# Patient Record
Sex: FEMALE | Race: WHITE | Employment: OTHER | URBAN - NONMETROPOLITAN AREA
[De-identification: names, ages, dates, MRNs, and addresses within clinical notes are randomized per-mention and may not be internally consistent; named-entity substitution may affect disease eponyms.]

---

## 2017-01-20 RX ORDER — PRAVASTATIN SODIUM 40 MG
TABLET ORAL
Qty: 30 TABLET | Refills: 11 | Status: SHIPPED | OUTPATIENT
Start: 2017-01-20 | End: 2018-01-30 | Stop reason: ALTCHOICE

## 2017-01-20 RX ORDER — SAXAGLIPTIN 5 MG/1
TABLET, FILM COATED ORAL
Qty: 30 TABLET | Refills: 11 | Status: SHIPPED | OUTPATIENT
Start: 2017-01-20 | End: 2018-01-29 | Stop reason: SDUPTHER

## 2017-01-27 ENCOUNTER — OFFICE VISIT (OUTPATIENT)
Dept: FAMILY MEDICINE CLINIC | Age: 62
End: 2017-01-27

## 2017-01-27 VITALS
OXYGEN SATURATION: 97 % | HEIGHT: 64 IN | WEIGHT: 223 LBS | BODY MASS INDEX: 38.07 KG/M2 | HEART RATE: 94 BPM | SYSTOLIC BLOOD PRESSURE: 126 MMHG | DIASTOLIC BLOOD PRESSURE: 76 MMHG

## 2017-01-27 DIAGNOSIS — L57.0 ACTINIC KERATOSIS: ICD-10-CM

## 2017-01-27 DIAGNOSIS — R20.9 SKIN SENSATION DISTURBANCE: ICD-10-CM

## 2017-01-27 DIAGNOSIS — D49.2 ABNORMAL SKIN GROWTH: ICD-10-CM

## 2017-01-27 DIAGNOSIS — E78.2 MIXED HYPERLIPIDEMIA: Primary | ICD-10-CM

## 2017-01-27 DIAGNOSIS — I10 BENIGN ESSENTIAL HTN: ICD-10-CM

## 2017-01-27 DIAGNOSIS — R39.15 URINARY URGENCY: ICD-10-CM

## 2017-01-27 DIAGNOSIS — Z11.4 SCREENING FOR HIV (HUMAN IMMUNODEFICIENCY VIRUS): ICD-10-CM

## 2017-01-27 DIAGNOSIS — F33.1 MODERATE EPISODE OF RECURRENT MAJOR DEPRESSIVE DISORDER (HCC): ICD-10-CM

## 2017-01-27 DIAGNOSIS — E13.8 DIABETES MELLITUS OF OTHER TYPE WITH COMPLICATION: ICD-10-CM

## 2017-01-27 DIAGNOSIS — E55.9 VITAMIN D DEFICIENCY: ICD-10-CM

## 2017-01-27 DIAGNOSIS — Z11.59 NEED FOR HEPATITIS C SCREENING TEST: ICD-10-CM

## 2017-01-27 LAB
BILIRUBIN, POC: ABNORMAL
BLOOD URINE, POC: ABNORMAL
CLARITY, POC: ABNORMAL
COLOR, POC: YELLOW
GLUCOSE URINE, POC: >=1000
KETONES, POC: ABNORMAL
LEUKOCYTE EST, POC: ABNORMAL
NITRITE, POC: ABNORMAL
PH, POC: 6
PROTEIN, POC: ABNORMAL
SPECIFIC GRAVITY, POC: 1.02
UROBILINOGEN, POC: 0.2

## 2017-01-27 PROCEDURE — 81002 URINALYSIS NONAUTO W/O SCOPE: CPT | Performed by: FAMILY MEDICINE

## 2017-01-27 PROCEDURE — 17000 DESTRUCT PREMALG LESION: CPT | Performed by: FAMILY MEDICINE

## 2017-01-27 PROCEDURE — 99214 OFFICE O/P EST MOD 30 MIN: CPT | Performed by: FAMILY MEDICINE

## 2017-01-27 ASSESSMENT — PATIENT HEALTH QUESTIONNAIRE - PHQ9
2. FEELING DOWN, DEPRESSED OR HOPELESS: 0
1. LITTLE INTEREST OR PLEASURE IN DOING THINGS: 0
SUM OF ALL RESPONSES TO PHQ QUESTIONS 1-9: 0
SUM OF ALL RESPONSES TO PHQ9 QUESTIONS 1 & 2: 0

## 2017-01-28 PROBLEM — F33.1 MODERATE EPISODE OF RECURRENT MAJOR DEPRESSIVE DISORDER (HCC): Status: ACTIVE | Noted: 2017-01-28

## 2017-02-06 ENCOUNTER — NURSE ONLY (OUTPATIENT)
Dept: FAMILY MEDICINE CLINIC | Age: 62
End: 2017-02-06

## 2017-02-06 DIAGNOSIS — E78.2 MIXED HYPERLIPIDEMIA: ICD-10-CM

## 2017-02-06 DIAGNOSIS — Z11.59 NEED FOR HEPATITIS C SCREENING TEST: ICD-10-CM

## 2017-02-06 DIAGNOSIS — Z11.4 SCREENING FOR HIV (HUMAN IMMUNODEFICIENCY VIRUS): ICD-10-CM

## 2017-02-06 DIAGNOSIS — E13.8 DIABETES MELLITUS OF OTHER TYPE WITH COMPLICATION: ICD-10-CM

## 2017-02-06 LAB
ALBUMIN SERPL-MCNC: 4.4 G/DL (ref 3.4–5)
ALP BLD-CCNC: 89 U/L (ref 40–129)
ALT SERPL-CCNC: 35 U/L (ref 10–40)
AST SERPL-CCNC: 24 U/L (ref 15–37)
BILIRUB SERPL-MCNC: 0.3 MG/DL (ref 0–1)
BILIRUBIN DIRECT: <0.2 MG/DL (ref 0–0.3)
BILIRUBIN, INDIRECT: NORMAL MG/DL (ref 0–1)
CHOLESTEROL, TOTAL: 233 MG/DL (ref 0–199)
CREATININE URINE: 79.6 MG/DL (ref 28–259)
HDLC SERPL-MCNC: 51 MG/DL (ref 40–60)
HEPATITIS C ANTIBODY INTERPRETATION: NORMAL
LDL CHOLESTEROL CALCULATED: 138 MG/DL
MICROALBUMIN UR-MCNC: <1.2 MG/DL
MICROALBUMIN/CREAT UR-RTO: NORMAL MG/G (ref 0–30)
TOTAL PROTEIN: 7.7 G/DL (ref 6.4–8.2)
TRIGL SERPL-MCNC: 222 MG/DL (ref 0–150)
VLDLC SERPL CALC-MCNC: 44 MG/DL

## 2017-02-06 PROCEDURE — 36415 COLL VENOUS BLD VENIPUNCTURE: CPT | Performed by: FAMILY MEDICINE

## 2017-02-07 DIAGNOSIS — E78.2 MIXED HYPERLIPIDEMIA: Primary | ICD-10-CM

## 2017-02-07 LAB
ESTIMATED AVERAGE GLUCOSE: 168.6 MG/DL
HBA1C MFR BLD: 7.5 %
HIV-1 AND HIV-2 ANTIBODIES: NORMAL

## 2017-04-27 RX ORDER — GLIMEPIRIDE 4 MG/1
TABLET ORAL
Qty: 60 TABLET | Refills: 1 | Status: SHIPPED | OUTPATIENT
Start: 2017-04-27 | End: 2017-07-31 | Stop reason: SDUPTHER

## 2017-07-31 ENCOUNTER — OFFICE VISIT (OUTPATIENT)
Dept: FAMILY MEDICINE CLINIC | Age: 62
End: 2017-07-31

## 2017-07-31 VITALS
OXYGEN SATURATION: 98 % | HEIGHT: 64 IN | DIASTOLIC BLOOD PRESSURE: 78 MMHG | TEMPERATURE: 98.4 F | HEART RATE: 67 BPM | WEIGHT: 222.3 LBS | BODY MASS INDEX: 37.95 KG/M2 | SYSTOLIC BLOOD PRESSURE: 126 MMHG

## 2017-07-31 DIAGNOSIS — F33.1 MODERATE EPISODE OF RECURRENT MAJOR DEPRESSIVE DISORDER (HCC): ICD-10-CM

## 2017-07-31 DIAGNOSIS — E11.9 CONTROLLED TYPE 2 DIABETES MELLITUS WITHOUT COMPLICATION, WITHOUT LONG-TERM CURRENT USE OF INSULIN (HCC): ICD-10-CM

## 2017-07-31 DIAGNOSIS — Z12.31 ENCOUNTER FOR SCREENING MAMMOGRAM FOR BREAST CANCER: ICD-10-CM

## 2017-07-31 DIAGNOSIS — H60.501 ACUTE OTITIS EXTERNA OF RIGHT EAR, UNSPECIFIED TYPE: ICD-10-CM

## 2017-07-31 DIAGNOSIS — E78.2 MIXED HYPERLIPIDEMIA: ICD-10-CM

## 2017-07-31 DIAGNOSIS — Z23 NEED FOR PROPHYLACTIC VACCINATION AND INOCULATION AGAINST VARICELLA: ICD-10-CM

## 2017-07-31 DIAGNOSIS — Z23 NEED FOR PROPHYLACTIC VACCINATION AGAINST STREPTOCOCCUS PNEUMONIAE (PNEUMOCOCCUS): ICD-10-CM

## 2017-07-31 DIAGNOSIS — I10 BENIGN ESSENTIAL HTN: Primary | ICD-10-CM

## 2017-07-31 DIAGNOSIS — E55.9 VITAMIN D DEFICIENCY: ICD-10-CM

## 2017-07-31 DIAGNOSIS — R53.83 FATIGUE, UNSPECIFIED TYPE: ICD-10-CM

## 2017-07-31 LAB
BASOPHILS ABSOLUTE: 0 K/UL (ref 0–0.2)
BASOPHILS RELATIVE PERCENT: 0.6 %
EOSINOPHILS ABSOLUTE: 0.2 K/UL (ref 0–0.6)
EOSINOPHILS RELATIVE PERCENT: 2.8 %
HCT VFR BLD CALC: 46.2 % (ref 36–48)
HEMOGLOBIN: 15.4 G/DL (ref 12–16)
LYMPHOCYTES ABSOLUTE: 2 K/UL (ref 1–5.1)
LYMPHOCYTES RELATIVE PERCENT: 32.7 %
MCH RBC QN AUTO: 28.2 PG (ref 26–34)
MCHC RBC AUTO-ENTMCNC: 33.3 G/DL (ref 31–36)
MCV RBC AUTO: 84.8 FL (ref 80–100)
MONOCYTES ABSOLUTE: 0.4 K/UL (ref 0–1.3)
MONOCYTES RELATIVE PERCENT: 6.6 %
NEUTROPHILS ABSOLUTE: 3.5 K/UL (ref 1.7–7.7)
NEUTROPHILS RELATIVE PERCENT: 57.3 %
PDW BLD-RTO: 15 % (ref 12.4–15.4)
PLATELET # BLD: 212 K/UL (ref 135–450)
PMV BLD AUTO: 8.9 FL (ref 5–10.5)
RBC # BLD: 5.44 M/UL (ref 4–5.2)
WBC # BLD: 6.2 K/UL (ref 4–11)

## 2017-07-31 PROCEDURE — 90732 PPSV23 VACC 2 YRS+ SUBQ/IM: CPT | Performed by: FAMILY MEDICINE

## 2017-07-31 PROCEDURE — 99214 OFFICE O/P EST MOD 30 MIN: CPT | Performed by: FAMILY MEDICINE

## 2017-07-31 PROCEDURE — 36415 COLL VENOUS BLD VENIPUNCTURE: CPT | Performed by: FAMILY MEDICINE

## 2017-07-31 PROCEDURE — 90471 IMMUNIZATION ADMIN: CPT | Performed by: FAMILY MEDICINE

## 2017-07-31 RX ORDER — GLIMEPIRIDE 4 MG/1
TABLET ORAL
Qty: 60 TABLET | Refills: 11 | Status: SHIPPED | OUTPATIENT
Start: 2017-07-31 | End: 2018-09-04 | Stop reason: SDUPTHER

## 2017-08-01 DIAGNOSIS — E11.9 CONTROLLED TYPE 2 DIABETES MELLITUS WITHOUT COMPLICATION, WITHOUT LONG-TERM CURRENT USE OF INSULIN (HCC): Primary | ICD-10-CM

## 2017-08-01 DIAGNOSIS — E78.00 ELEVATED CHOLESTEROL: Primary | ICD-10-CM

## 2017-08-01 LAB
A/G RATIO: 1.6 (ref 1.1–2.2)
ALBUMIN SERPL-MCNC: 4.7 G/DL (ref 3.4–5)
ALP BLD-CCNC: 79 U/L (ref 40–129)
ALT SERPL-CCNC: 28 U/L (ref 10–40)
ANION GAP SERPL CALCULATED.3IONS-SCNC: 15 MMOL/L (ref 3–16)
AST SERPL-CCNC: 22 U/L (ref 15–37)
BILIRUB SERPL-MCNC: <0.2 MG/DL (ref 0–1)
BUN BLDV-MCNC: 20 MG/DL (ref 7–20)
CALCIUM SERPL-MCNC: 9.8 MG/DL (ref 8.3–10.6)
CHLORIDE BLD-SCNC: 100 MMOL/L (ref 99–110)
CHOLESTEROL, TOTAL: 260 MG/DL (ref 0–199)
CO2: 24 MMOL/L (ref 21–32)
CREAT SERPL-MCNC: 0.7 MG/DL (ref 0.6–1.2)
ESTIMATED AVERAGE GLUCOSE: 162.8 MG/DL
GFR AFRICAN AMERICAN: >60
GFR NON-AFRICAN AMERICAN: >60
GLOBULIN: 3 G/DL
GLUCOSE BLD-MCNC: 117 MG/DL (ref 70–99)
HBA1C MFR BLD: 7.3 %
HDLC SERPL-MCNC: 56 MG/DL (ref 40–60)
LDL CHOLESTEROL CALCULATED: 145 MG/DL
POTASSIUM SERPL-SCNC: 4.7 MMOL/L (ref 3.5–5.1)
SODIUM BLD-SCNC: 139 MMOL/L (ref 136–145)
TOTAL PROTEIN: 7.7 G/DL (ref 6.4–8.2)
TRIGL SERPL-MCNC: 297 MG/DL (ref 0–150)
TSH SERPL DL<=0.05 MIU/L-ACNC: 2.05 UIU/ML (ref 0.27–4.2)
VITAMIN D 25-HYDROXY: 73.2 NG/ML
VLDLC SERPL CALC-MCNC: 59 MG/DL

## 2017-08-01 RX ORDER — ATORVASTATIN CALCIUM 40 MG/1
40 TABLET, FILM COATED ORAL DAILY
Qty: 30 TABLET | Refills: 5 | Status: SHIPPED | OUTPATIENT
Start: 2017-08-01 | End: 2018-01-30 | Stop reason: SINTOL

## 2017-08-02 DIAGNOSIS — E55.9 VITAMIN D DEFICIENCY: Primary | ICD-10-CM

## 2017-08-02 DIAGNOSIS — E11.9 DIABETES MELLITUS WITHOUT COMPLICATION (HCC): ICD-10-CM

## 2017-11-13 DIAGNOSIS — I10 BENIGN ESSENTIAL HTN: ICD-10-CM

## 2017-11-13 RX ORDER — LISINOPRIL 10 MG/1
TABLET ORAL
Qty: 30 TABLET | Refills: 10 | Status: SHIPPED | OUTPATIENT
Start: 2017-11-13 | End: 2018-07-23 | Stop reason: SDUPTHER

## 2017-11-13 RX ORDER — CHOLECALCIFEROL (VITAMIN D3) 1250 MCG
CAPSULE ORAL
Qty: 4 CAPSULE | Refills: 10 | Status: SHIPPED | OUTPATIENT
Start: 2017-11-13 | End: 2018-03-28 | Stop reason: ALTCHOICE

## 2018-01-16 ENCOUNTER — TELEPHONE (OUTPATIENT)
Dept: FAMILY MEDICINE CLINIC | Age: 63
End: 2018-01-16

## 2018-01-16 DIAGNOSIS — Z12.39 SCREENING FOR BREAST CANCER: Primary | ICD-10-CM

## 2018-01-29 RX ORDER — SAXAGLIPTIN 5 MG/1
TABLET, FILM COATED ORAL
Qty: 30 TABLET | Refills: 10 | Status: SHIPPED | OUTPATIENT
Start: 2018-01-29 | End: 2018-07-23

## 2018-01-29 RX ORDER — CANAGLIFLOZIN 300 MG/1
TABLET, FILM COATED ORAL
Qty: 30 TABLET | Refills: 3 | Status: SHIPPED | OUTPATIENT
Start: 2018-01-29 | End: 2018-09-04 | Stop reason: SDUPTHER

## 2018-01-30 ENCOUNTER — OFFICE VISIT (OUTPATIENT)
Dept: FAMILY MEDICINE CLINIC | Age: 63
End: 2018-01-30

## 2018-01-30 VITALS
HEIGHT: 62 IN | WEIGHT: 221.8 LBS | SYSTOLIC BLOOD PRESSURE: 148 MMHG | HEART RATE: 84 BPM | OXYGEN SATURATION: 98 % | BODY MASS INDEX: 40.82 KG/M2 | DIASTOLIC BLOOD PRESSURE: 72 MMHG

## 2018-01-30 DIAGNOSIS — B37.31 VAGINAL YEAST INFECTION: Primary | ICD-10-CM

## 2018-01-30 PROCEDURE — 99212 OFFICE O/P EST SF 10 MIN: CPT | Performed by: FAMILY MEDICINE

## 2018-01-30 RX ORDER — FLUCONAZOLE 100 MG/1
TABLET ORAL
Qty: 3 TABLET | Refills: 0 | Status: SHIPPED | OUTPATIENT
Start: 2018-01-30 | End: 2018-02-08 | Stop reason: ALTCHOICE

## 2018-01-30 NOTE — PROGRESS NOTES
Chief Complaint   Patient presents with    Vaginal Discharge    Vaginal Itching   Also letting us know that she had to stop the atorvastatin due to leg and shoulder pain. The vaginal discharge is sticky and has an odor smell. She will return for pap and pelvic with an NP.  HPI:   Abbi Arthur is a 66 y.o. (: 1955) here today for vaginal discharge and itching. Patient's medications, allergies, past medical, surgical, social and family histories were reviewed and updated as appropriate on 2018 at 2:55 PM.    Hemoglobin A1C (%)   Date Value   2017 7.3     Microalbumin, Random Urine (mg/dL)   Date Value   2017 <1.20     LDL Calculated (mg/dL)   Date Value   2017 145 (H)     Review of Systems   Genitourinary: Positive for vaginal discharge. Additional review of systems may be scanned into the media section of this medical record. Any responses requiring further intervention were pursued. OBJECTIVE:    Ht 5' 2.25\" (1.581 m)   Wt 221 lb 12.8 oz (100.6 kg)   BMI 40.24 kg/m²   Physical Exam   Constitutional: She is oriented to person, place, and time. She appears well-developed and well-nourished. No distress. HENT:   Head: Normocephalic and atraumatic. Cardiovascular: Normal rate. Pulmonary/Chest: Effort normal. No respiratory distress. Neurological: She is alert and oriented to person, place, and time. Skin: She is not diaphoretic. Psychiatric: She has a normal mood and affect. Her behavior is normal. Judgment and thought content normal.   Nursing note and vitals reviewed. ASSESSMENT/PLAN:    Angus Lovell was seen today for vaginal discharge and vaginal itching. Diagnoses and all orders for this visit:    Vaginal yeast infection    Other orders  -     fluconazole (DIFLUCAN) 100 MG tablet; 1 daily    Patient did not wish for me to examine, she actually thought she was seeing the nurse practitioner today.   I advised we will treat empirically with Diflucan and

## 2018-01-31 RX ORDER — PRAVASTATIN SODIUM 20 MG
20 TABLET ORAL DAILY
Qty: 30 TABLET | Refills: 11 | Status: SHIPPED | OUTPATIENT
Start: 2018-01-31 | End: 2018-07-23 | Stop reason: ALTCHOICE

## 2018-02-08 ENCOUNTER — OFFICE VISIT (OUTPATIENT)
Dept: FAMILY MEDICINE CLINIC | Age: 63
End: 2018-02-08

## 2018-02-08 VITALS
OXYGEN SATURATION: 96 % | BODY MASS INDEX: 39.48 KG/M2 | WEIGHT: 217.6 LBS | DIASTOLIC BLOOD PRESSURE: 90 MMHG | HEART RATE: 92 BPM | SYSTOLIC BLOOD PRESSURE: 124 MMHG

## 2018-02-08 DIAGNOSIS — Z12.31 ENCOUNTER FOR SCREENING MAMMOGRAM FOR BREAST CANCER: Primary | ICD-10-CM

## 2018-02-08 DIAGNOSIS — B37.2 YEAST INFECTION OF THE SKIN: ICD-10-CM

## 2018-02-08 DIAGNOSIS — B37.31 VAGINAL YEAST INFECTION: ICD-10-CM

## 2018-02-08 DIAGNOSIS — N95.2 POST-MENOPAUSAL ATROPHIC VAGINITIS: ICD-10-CM

## 2018-02-08 DIAGNOSIS — B96.89 BACTERIAL VAGINOSIS: ICD-10-CM

## 2018-02-08 DIAGNOSIS — R35.0 URINARY FREQUENCY: ICD-10-CM

## 2018-02-08 DIAGNOSIS — N76.0 BACTERIAL VAGINOSIS: ICD-10-CM

## 2018-02-08 DIAGNOSIS — Z78.0 POST-MENOPAUSAL: ICD-10-CM

## 2018-02-08 DIAGNOSIS — N89.8 VAGINAL DISCHARGE: ICD-10-CM

## 2018-02-08 LAB
BILIRUBIN, POC: NORMAL
BLOOD URINE, POC: NORMAL
CLARITY, POC: NORMAL
COLOR, POC: YELLOW
GLUCOSE URINE, POC: NORMAL
KETONES, POC: NORMAL
LEUKOCYTE EST, POC: NORMAL
NITRITE, POC: NORMAL
PH, POC: 5
PROTEIN, POC: NORMAL
SPECIFIC GRAVITY, POC: 1.02
UROBILINOGEN, POC: 0.2

## 2018-02-08 PROCEDURE — 99214 OFFICE O/P EST MOD 30 MIN: CPT | Performed by: NURSE PRACTITIONER

## 2018-02-08 PROCEDURE — 81002 URINALYSIS NONAUTO W/O SCOPE: CPT | Performed by: NURSE PRACTITIONER

## 2018-02-08 RX ORDER — NYSTATIN 100000 [USP'U]/G
POWDER TOPICAL
Qty: 30 G | Refills: 3 | Status: SHIPPED | OUTPATIENT
Start: 2018-02-08 | End: 2018-07-23 | Stop reason: ALTCHOICE

## 2018-02-08 RX ORDER — METRONIDAZOLE 500 MG/1
500 TABLET ORAL 3 TIMES DAILY
Qty: 30 TABLET | Refills: 0 | Status: SHIPPED | OUTPATIENT
Start: 2018-02-08 | End: 2018-02-18

## 2018-02-08 RX ORDER — FLUCONAZOLE 150 MG/1
150 TABLET ORAL ONCE
Qty: 1 TABLET | Refills: 0 | Status: SHIPPED | OUTPATIENT
Start: 2018-02-08 | End: 2018-02-08

## 2018-02-09 LAB
CANDIDA SPECIES, DNA PROBE: ABNORMAL
GARDNERELLA VAGINALIS, DNA PROBE: ABNORMAL
TRICHOMONAS VAGINALIS DNA: ABNORMAL

## 2018-02-10 LAB — URINE CULTURE, ROUTINE: NORMAL

## 2018-02-10 ASSESSMENT — ENCOUNTER SYMPTOMS
COLOR CHANGE: 1
RESPIRATORY NEGATIVE: 1
GASTROINTESTINAL NEGATIVE: 1

## 2018-02-12 LAB
C. TRACHOMATIS DNA ,URINE: NEGATIVE
HPV COMMENT: NORMAL
HPV TYPE 16: NOT DETECTED
HPV TYPE 18: NOT DETECTED
HPVOH (OTHER TYPES): NOT DETECTED
N. GONORRHOEAE DNA, URINE: NEGATIVE

## 2018-02-23 DIAGNOSIS — Z12.39 BREAST CANCER SCREENING: Primary | ICD-10-CM

## 2018-03-14 ENCOUNTER — TELEPHONE (OUTPATIENT)
Dept: FAMILY MEDICINE CLINIC | Age: 63
End: 2018-03-14

## 2018-03-14 DIAGNOSIS — E11.9 TYPE 2 DIABETES MELLITUS WITHOUT COMPLICATION, WITHOUT LONG-TERM CURRENT USE OF INSULIN (HCC): ICD-10-CM

## 2018-03-14 DIAGNOSIS — Z12.39 SCREENING FOR BREAST CANCER: ICD-10-CM

## 2018-03-19 ENCOUNTER — NURSE ONLY (OUTPATIENT)
Dept: FAMILY MEDICINE CLINIC | Age: 63
End: 2018-03-19

## 2018-03-19 DIAGNOSIS — E11.9 CONTROLLED TYPE 2 DIABETES MELLITUS WITHOUT COMPLICATION, WITHOUT LONG-TERM CURRENT USE OF INSULIN (HCC): ICD-10-CM

## 2018-03-19 DIAGNOSIS — E78.00 ELEVATED CHOLESTEROL: ICD-10-CM

## 2018-03-19 DIAGNOSIS — E55.9 VITAMIN D DEFICIENCY: ICD-10-CM

## 2018-03-19 DIAGNOSIS — E11.9 DIABETES MELLITUS WITHOUT COMPLICATION (HCC): ICD-10-CM

## 2018-03-19 DIAGNOSIS — E11.9 TYPE 2 DIABETES MELLITUS WITHOUT COMPLICATION, WITHOUT LONG-TERM CURRENT USE OF INSULIN (HCC): ICD-10-CM

## 2018-03-19 PROCEDURE — 36415 COLL VENOUS BLD VENIPUNCTURE: CPT | Performed by: FAMILY MEDICINE

## 2018-03-20 LAB
ALBUMIN SERPL-MCNC: 4.8 G/DL (ref 3.4–5)
ALP BLD-CCNC: 64 U/L (ref 40–129)
ALT SERPL-CCNC: 28 U/L (ref 10–40)
AST SERPL-CCNC: 26 U/L (ref 15–37)
BILIRUB SERPL-MCNC: 0.3 MG/DL (ref 0–1)
BILIRUBIN DIRECT: <0.2 MG/DL (ref 0–0.3)
BILIRUBIN, INDIRECT: NORMAL MG/DL (ref 0–1)
CHOLESTEROL, TOTAL: 324 MG/DL (ref 0–199)
CREATININE URINE: 71.4 MG/DL (ref 28–259)
ESTIMATED AVERAGE GLUCOSE: 159.9 MG/DL
HBA1C MFR BLD: 7.2 %
HDLC SERPL-MCNC: 56 MG/DL (ref 40–60)
LDL CHOLESTEROL CALCULATED: 214 MG/DL
MICROALBUMIN UR-MCNC: <1.2 MG/DL
MICROALBUMIN/CREAT UR-RTO: NORMAL MG/G (ref 0–30)
TOTAL PROTEIN: 7.9 G/DL (ref 6.4–8.2)
TRIGL SERPL-MCNC: 271 MG/DL (ref 0–150)
VLDLC SERPL CALC-MCNC: 54 MG/DL

## 2018-03-21 LAB — VITAMIN D 25-HYDROXY: 116.8 NG/ML

## 2018-03-21 RX ORDER — ROSUVASTATIN CALCIUM 5 MG/1
5 TABLET, COATED ORAL NIGHTLY
Qty: 30 TABLET | Refills: 11 | Status: SHIPPED | OUTPATIENT
Start: 2018-03-21 | End: 2019-04-08 | Stop reason: SDUPTHER

## 2018-06-20 ENCOUNTER — TELEPHONE (OUTPATIENT)
Dept: FAMILY MEDICINE CLINIC | Age: 63
End: 2018-06-20

## 2018-06-20 NOTE — TELEPHONE ENCOUNTER
Attempted to contact patient on 6/20/2018. Result: left message on the patient's voicemail asking patient to return my call. Pre-Visit planning not completed.

## 2018-06-22 NOTE — TELEPHONE ENCOUNTER
Attempted to contact patient on 6/22/2018. Result: left message on the patient's voicemail asking patient to return my call. Pre-Visit planning not completed.

## 2018-07-10 ENCOUNTER — TELEPHONE (OUTPATIENT)
Dept: FAMILY MEDICINE CLINIC | Age: 63
End: 2018-07-10

## 2018-07-16 NOTE — TELEPHONE ENCOUNTER
Attempted to contact patient on 7/16/2018. Result: left message on the patient's voicemail asking patient to return my call. Pre-Visit planning not completed.

## 2018-07-18 DIAGNOSIS — Z12.39 SCREENING FOR BREAST CANCER: ICD-10-CM

## 2018-07-18 DIAGNOSIS — Z12.39 BREAST CANCER SCREENING: ICD-10-CM

## 2018-07-18 DIAGNOSIS — Z78.0 POST-MENOPAUSAL: ICD-10-CM

## 2018-07-23 ENCOUNTER — OFFICE VISIT (OUTPATIENT)
Dept: FAMILY MEDICINE CLINIC | Age: 63
End: 2018-07-23

## 2018-07-23 VITALS
SYSTOLIC BLOOD PRESSURE: 138 MMHG | DIASTOLIC BLOOD PRESSURE: 72 MMHG | HEART RATE: 87 BPM | OXYGEN SATURATION: 98 % | HEIGHT: 63 IN | WEIGHT: 222.4 LBS | BODY MASS INDEX: 39.41 KG/M2

## 2018-07-23 DIAGNOSIS — F32.9 REACTIVE DEPRESSION: ICD-10-CM

## 2018-07-23 DIAGNOSIS — E55.9 VITAMIN D DEFICIENCY: ICD-10-CM

## 2018-07-23 DIAGNOSIS — E78.2 MIXED HYPERLIPIDEMIA: ICD-10-CM

## 2018-07-23 DIAGNOSIS — R09.89 BILATERAL CAROTID BRUITS: ICD-10-CM

## 2018-07-23 DIAGNOSIS — E11.9 CONTROLLED TYPE 2 DIABETES MELLITUS WITHOUT COMPLICATION, WITHOUT LONG-TERM CURRENT USE OF INSULIN (HCC): Primary | ICD-10-CM

## 2018-07-23 DIAGNOSIS — I10 BENIGN ESSENTIAL HTN: ICD-10-CM

## 2018-07-23 PROBLEM — F33.1 MODERATE EPISODE OF RECURRENT MAJOR DEPRESSIVE DISORDER (HCC): Status: RESOLVED | Noted: 2017-01-28 | Resolved: 2018-07-23

## 2018-07-23 LAB — HBA1C MFR BLD: 6.7 %

## 2018-07-23 PROCEDURE — 83036 HEMOGLOBIN GLYCOSYLATED A1C: CPT | Performed by: FAMILY MEDICINE

## 2018-07-23 PROCEDURE — 99214 OFFICE O/P EST MOD 30 MIN: CPT | Performed by: FAMILY MEDICINE

## 2018-07-23 RX ORDER — LISINOPRIL 2.5 MG/1
TABLET ORAL
Qty: 30 TABLET | Refills: 11 | Status: SHIPPED | OUTPATIENT
Start: 2018-07-23 | End: 2019-07-03

## 2018-07-27 ENCOUNTER — NURSE ONLY (OUTPATIENT)
Dept: FAMILY MEDICINE CLINIC | Age: 63
End: 2018-07-27

## 2018-07-27 DIAGNOSIS — E55.9 VITAMIN D DEFICIENCY: ICD-10-CM

## 2018-07-27 DIAGNOSIS — I10 BENIGN ESSENTIAL HTN: ICD-10-CM

## 2018-07-27 DIAGNOSIS — E78.2 MIXED HYPERLIPIDEMIA: ICD-10-CM

## 2018-07-27 DIAGNOSIS — E11.9 CONTROLLED TYPE 2 DIABETES MELLITUS WITHOUT COMPLICATION, WITHOUT LONG-TERM CURRENT USE OF INSULIN (HCC): Primary | ICD-10-CM

## 2018-07-27 LAB
A/G RATIO: 1.5 (ref 1.1–2.2)
ALBUMIN SERPL-MCNC: 4.5 G/DL (ref 3.4–5)
ALP BLD-CCNC: 81 U/L (ref 40–129)
ALT SERPL-CCNC: 29 U/L (ref 10–40)
ANION GAP SERPL CALCULATED.3IONS-SCNC: 15 MMOL/L (ref 3–16)
AST SERPL-CCNC: 24 U/L (ref 15–37)
BILIRUB SERPL-MCNC: 0.3 MG/DL (ref 0–1)
BUN BLDV-MCNC: 20 MG/DL (ref 7–20)
CALCIUM SERPL-MCNC: 9.6 MG/DL (ref 8.3–10.6)
CHLORIDE BLD-SCNC: 101 MMOL/L (ref 99–110)
CHOLESTEROL, TOTAL: 188 MG/DL (ref 0–199)
CO2: 23 MMOL/L (ref 21–32)
CREAT SERPL-MCNC: 0.9 MG/DL (ref 0.6–1.2)
GFR AFRICAN AMERICAN: >60
GFR NON-AFRICAN AMERICAN: >60
GLOBULIN: 3.1 G/DL
GLUCOSE BLD-MCNC: 136 MG/DL (ref 70–99)
HDLC SERPL-MCNC: 58 MG/DL (ref 40–60)
LDL CHOLESTEROL CALCULATED: 85 MG/DL
POTASSIUM SERPL-SCNC: 4.9 MMOL/L (ref 3.5–5.1)
SODIUM BLD-SCNC: 139 MMOL/L (ref 136–145)
TOTAL PROTEIN: 7.6 G/DL (ref 6.4–8.2)
TRIGL SERPL-MCNC: 225 MG/DL (ref 0–150)
VITAMIN D 25-HYDROXY: 86.3 NG/ML
VLDLC SERPL CALC-MCNC: 45 MG/DL

## 2018-07-27 PROCEDURE — 36415 COLL VENOUS BLD VENIPUNCTURE: CPT | Performed by: FAMILY MEDICINE

## 2018-07-28 LAB
ESTIMATED AVERAGE GLUCOSE: 154.2 MG/DL
HBA1C MFR BLD: 7 %

## 2018-09-04 RX ORDER — CANAGLIFLOZIN 300 MG/1
TABLET, FILM COATED ORAL
Qty: 30 TABLET | Refills: 2 | Status: SHIPPED | OUTPATIENT
Start: 2018-09-04 | End: 2018-12-29 | Stop reason: SDUPTHER

## 2018-09-04 RX ORDER — GLIMEPIRIDE 4 MG/1
TABLET ORAL
Qty: 60 TABLET | Refills: 2 | Status: SHIPPED | OUTPATIENT
Start: 2018-09-04 | End: 2019-06-10 | Stop reason: SDUPTHER

## 2018-09-07 ENCOUNTER — HOSPITAL ENCOUNTER (OUTPATIENT)
Dept: VASCULAR LAB | Age: 63
Discharge: HOME | End: 2018-09-07
Payer: COMMERCIAL

## 2018-09-07 DIAGNOSIS — R09.89 BILATERAL CAROTID BRUITS: ICD-10-CM

## 2018-09-07 PROCEDURE — 93880 EXTRACRANIAL BILAT STUDY: CPT

## 2018-09-11 DIAGNOSIS — R09.89 BILATERAL CAROTID BRUITS: Primary | ICD-10-CM

## 2018-11-14 ENCOUNTER — TELEPHONE (OUTPATIENT)
Dept: FAMILY MEDICINE CLINIC | Age: 63
End: 2018-11-14

## 2018-11-27 ENCOUNTER — OFFICE VISIT (OUTPATIENT)
Dept: FAMILY MEDICINE CLINIC | Age: 63
End: 2018-11-27
Payer: COMMERCIAL

## 2018-11-27 VITALS
OXYGEN SATURATION: 96 % | BODY MASS INDEX: 40.22 KG/M2 | HEIGHT: 63 IN | HEART RATE: 71 BPM | WEIGHT: 227 LBS | DIASTOLIC BLOOD PRESSURE: 74 MMHG | SYSTOLIC BLOOD PRESSURE: 118 MMHG

## 2018-11-27 DIAGNOSIS — I87.2 VENOUS INSUFFICIENCY: ICD-10-CM

## 2018-11-27 DIAGNOSIS — E55.9 VITAMIN D DEFICIENCY: ICD-10-CM

## 2018-11-27 DIAGNOSIS — R09.89 CAROTID BRUIT, UNSPECIFIED LATERALITY: ICD-10-CM

## 2018-11-27 DIAGNOSIS — E11.9 CONTROLLED TYPE 2 DIABETES MELLITUS WITHOUT COMPLICATION, WITHOUT LONG-TERM CURRENT USE OF INSULIN (HCC): Primary | ICD-10-CM

## 2018-11-27 DIAGNOSIS — Z12.31 ENCOUNTER FOR SCREENING MAMMOGRAM FOR BREAST CANCER: ICD-10-CM

## 2018-11-27 DIAGNOSIS — Z23 NEED FOR INFLUENZA VACCINATION: ICD-10-CM

## 2018-11-27 DIAGNOSIS — I10 BENIGN ESSENTIAL HTN: ICD-10-CM

## 2018-11-27 DIAGNOSIS — E78.2 MIXED HYPERLIPIDEMIA: ICD-10-CM

## 2018-11-27 LAB — HBA1C MFR BLD: 7.6 %

## 2018-11-27 PROCEDURE — 99214 OFFICE O/P EST MOD 30 MIN: CPT | Performed by: FAMILY MEDICINE

## 2018-11-27 PROCEDURE — 83036 HEMOGLOBIN GLYCOSYLATED A1C: CPT | Performed by: FAMILY MEDICINE

## 2018-11-27 PROCEDURE — 90688 IIV4 VACCINE SPLT 0.5 ML IM: CPT | Performed by: FAMILY MEDICINE

## 2018-11-27 PROCEDURE — 90471 IMMUNIZATION ADMIN: CPT | Performed by: FAMILY MEDICINE

## 2018-11-27 NOTE — PROGRESS NOTES
TABLET BY MOUTH EVERY MORNING BEFORE BREAKFAST Yes JEANETTE Prather CNP   lisinopril (PRINIVIL;ZESTRIL) 2.5 MG tablet 1 daily Yes Rosy Alcantar MD   vitamin D-3 (CHOLECALCIFEROL) 5000 units TABS Take 1 tablet by mouth daily Yes Rosy Alcantar MD   rosuvastatin (CRESTOR) 5 MG tablet Take 1 tablet by mouth nightly Yes Rosy Alcantar MD   Omega-3 Fatty Acids (OMEGA 3 PO) Take by mouth Yes Historical Provider, MD   fluticasone (FLONASE) 50 MCG/ACT nasal spray In each nostril daily Yes Rosy Alcantar MD   cetirizine (ZYRTEC ALLERGY) 10 MG tablet Take 1 tablet by mouth daily Yes Rosy Alcantar MD   calcium carbonate (OSCAL) 500 MG TABS tablet Take 2 tablets by mouth daily Yes Rosy Alcantar MD   aspirin 81 MG tablet Take 81 mg by mouth daily. Yes Historical Provider, MD     Allergies   Allergen Reactions    Codeine     Fenofibrate      Skin turns red and get hives    Metformin And Related      She states it caused joint pain. She tried it 3 times and had same reaction. OBJECTIVE:  Estimated body mass index is 40.86 kg/m² as calculated from the following:    Height as of this encounter: 5' 2.5\" (1.588 m). Weight as of this encounter: 227 lb (103 kg). Vitals:    11/27/18 1251   BP: 118/74   Site: Left Upper Arm   Position: Sitting   Cuff Size: Large Adult   Pulse: 71   SpO2: 96%   Weight: 227 lb (103 kg)   Height: 5' 2.5\" (1.588 m)     BP Readings from Last 2 Encounters:   11/27/18 118/74   07/23/18 138/72     Wt Readings from Last 3 Encounters:   11/27/18 227 lb (103 kg)   07/23/18 222 lb 6.4 oz (100.9 kg)   02/08/18 217 lb 9.6 oz (98.7 kg)       Physical Exam   Constitutional: She appears well-developed and well-nourished. No distress. HENT:   Head: Normocephalic and atraumatic.    Right Ear: External ear normal.   Left Ear: External ear normal.   Nose: Nose normal.   Lips symmetric   Eyes: Pupils are equal, round, and reactive ongoing signs or symptoms or worsening, or proceed to the emergency room. No changes in past medical history, past surgical history, social history, or family history were noted during the patient encounter unless specifically listed above. All updates of past medical history, past surgical history, social history, or family history were reviewed personally by me during the office visit. All problems listed in the assessment are stable unless noted otherwise. Medication profile reviewed personally by me during the office visit. Medication side effects and possible impairments from medications were discussed as applicable. This document was prepared by a combination of typing and transcription through a voice recognition software. Scribe attestation: Jarvis Haines am scribing for and in the presence of Desean Mccoy MD. Electronically signed by Candi Moore on 11/27/2018 at 1:26 PM      Provider attestation:     I, Dr. Shania Scherer, personally performed the services described in this documentation, as scribed by the above signed scribe in my presence, and it is both accurate and complete. I agree with the ROS and Past Histories independently gathered by the clinical support staff and the remaining scribed note accurately describes my personal service to the patient.       11/27/2018    1:47 PM

## 2018-12-31 RX ORDER — CANAGLIFLOZIN 300 MG/1
TABLET, FILM COATED ORAL
Qty: 30 TABLET | Refills: 1 | Status: SHIPPED | OUTPATIENT
Start: 2018-12-31 | End: 2019-03-07 | Stop reason: SDUPTHER

## 2019-02-12 ENCOUNTER — TELEPHONE (OUTPATIENT)
Dept: FAMILY MEDICINE CLINIC | Age: 64
End: 2019-02-12

## 2019-02-19 ENCOUNTER — NURSE ONLY (OUTPATIENT)
Dept: FAMILY MEDICINE CLINIC | Age: 64
End: 2019-02-19
Payer: COMMERCIAL

## 2019-02-19 DIAGNOSIS — E11.9 CONTROLLED TYPE 2 DIABETES MELLITUS WITHOUT COMPLICATION, WITHOUT LONG-TERM CURRENT USE OF INSULIN (HCC): ICD-10-CM

## 2019-02-19 DIAGNOSIS — E78.2 MIXED HYPERLIPIDEMIA: ICD-10-CM

## 2019-02-19 LAB
ALBUMIN SERPL-MCNC: 4.7 G/DL (ref 3.4–5)
ALP BLD-CCNC: 78 U/L (ref 40–129)
ALT SERPL-CCNC: 24 U/L (ref 10–40)
AST SERPL-CCNC: 20 U/L (ref 15–37)
BILIRUB SERPL-MCNC: <0.2 MG/DL (ref 0–1)
BILIRUBIN DIRECT: <0.2 MG/DL (ref 0–0.3)
BILIRUBIN, INDIRECT: NORMAL MG/DL (ref 0–1)
CHOLESTEROL, TOTAL: 175 MG/DL (ref 0–199)
FOLATE: >20 NG/ML (ref 4.78–24.2)
GLUCOSE BLD-MCNC: 167 MG/DL (ref 70–99)
HDLC SERPL-MCNC: 56 MG/DL (ref 40–60)
LDL CHOLESTEROL CALCULATED: 87 MG/DL
TOTAL PROTEIN: 7.6 G/DL (ref 6.4–8.2)
TRIGL SERPL-MCNC: 162 MG/DL (ref 0–150)
VITAMIN B-12: 536 PG/ML (ref 211–911)
VLDLC SERPL CALC-MCNC: 32 MG/DL

## 2019-02-19 PROCEDURE — 36415 COLL VENOUS BLD VENIPUNCTURE: CPT | Performed by: FAMILY MEDICINE

## 2019-02-20 LAB
ESTIMATED AVERAGE GLUCOSE: 177.2 MG/DL
HBA1C MFR BLD: 7.8 %

## 2019-02-26 ENCOUNTER — OFFICE VISIT (OUTPATIENT)
Dept: FAMILY MEDICINE CLINIC | Age: 64
End: 2019-02-26
Payer: COMMERCIAL

## 2019-02-26 VITALS
OXYGEN SATURATION: 96 % | WEIGHT: 227.6 LBS | BODY MASS INDEX: 40.97 KG/M2 | HEART RATE: 90 BPM | SYSTOLIC BLOOD PRESSURE: 128 MMHG | DIASTOLIC BLOOD PRESSURE: 88 MMHG

## 2019-02-26 DIAGNOSIS — I10 BENIGN ESSENTIAL HTN: ICD-10-CM

## 2019-02-26 DIAGNOSIS — R09.89 BILATERAL CAROTID BRUITS: ICD-10-CM

## 2019-02-26 DIAGNOSIS — E78.2 MIXED HYPERLIPIDEMIA: ICD-10-CM

## 2019-02-26 DIAGNOSIS — E55.9 VITAMIN D DEFICIENCY: ICD-10-CM

## 2019-02-26 DIAGNOSIS — F32.9 REACTIVE DEPRESSION: ICD-10-CM

## 2019-02-26 PROCEDURE — 99214 OFFICE O/P EST MOD 30 MIN: CPT | Performed by: FAMILY MEDICINE

## 2019-03-07 RX ORDER — CANAGLIFLOZIN 300 MG/1
TABLET, FILM COATED ORAL
Qty: 30 TABLET | Refills: 0 | Status: SHIPPED | OUTPATIENT
Start: 2019-03-07 | End: 2019-04-08 | Stop reason: SDUPTHER

## 2019-04-08 RX ORDER — ROSUVASTATIN CALCIUM 5 MG/1
TABLET, COATED ORAL
Qty: 30 TABLET | Refills: 10 | Status: SHIPPED | OUTPATIENT
Start: 2019-04-08 | End: 2020-05-15

## 2019-04-08 RX ORDER — CANAGLIFLOZIN 300 MG/1
TABLET, FILM COATED ORAL
Qty: 30 TABLET | Refills: 11 | Status: SHIPPED | OUTPATIENT
Start: 2019-04-08 | End: 2020-05-15

## 2019-04-08 RX ORDER — CANAGLIFLOZIN 300 MG/1
TABLET, FILM COATED ORAL
Qty: 30 TABLET | Refills: 0 | Status: SHIPPED | OUTPATIENT
Start: 2019-04-08 | End: 2019-05-10 | Stop reason: SDUPTHER

## 2019-04-08 NOTE — TELEPHONE ENCOUNTER
Refill request for rosuvastatin medication.      Name of Jerrell Skritter    Last visit - 2/26/19     Pending visit - 5/21/19    Last refill -3/21/18

## 2019-04-09 ENCOUNTER — TELEPHONE (OUTPATIENT)
Dept: FAMILY MEDICINE CLINIC | Age: 64
End: 2019-04-09

## 2019-05-07 ENCOUNTER — TELEPHONE (OUTPATIENT)
Dept: FAMILY MEDICINE CLINIC | Age: 64
End: 2019-05-07

## 2019-05-07 NOTE — TELEPHONE ENCOUNTER
Attempted to contact patient on 5/7/2019. Result: left message on the patient's voicemail asking patient to return my call. Pre-Visit planning not completed.      5/21

## 2019-05-15 NOTE — TELEPHONE ENCOUNTER
Attempted to contact patient on 5/15/2019. Result: left message on the patient's voicemail asking patient to return my call. Pre-Visit planning not completed.

## 2019-05-30 ENCOUNTER — TELEPHONE (OUTPATIENT)
Dept: FAMILY MEDICINE CLINIC | Age: 64
End: 2019-05-30

## 2019-06-10 RX ORDER — GLIMEPIRIDE 4 MG/1
TABLET ORAL
Qty: 60 TABLET | Refills: 1 | Status: SHIPPED | OUTPATIENT
Start: 2019-06-10 | End: 2019-11-16 | Stop reason: SDUPTHER

## 2019-06-19 ENCOUNTER — TELEPHONE (OUTPATIENT)
Dept: FAMILY MEDICINE CLINIC | Age: 64
End: 2019-06-19

## 2019-07-03 ENCOUNTER — OFFICE VISIT (OUTPATIENT)
Dept: FAMILY MEDICINE CLINIC | Age: 64
End: 2019-07-03
Payer: COMMERCIAL

## 2019-07-03 VITALS
HEIGHT: 63 IN | SYSTOLIC BLOOD PRESSURE: 128 MMHG | WEIGHT: 224 LBS | BODY MASS INDEX: 39.69 KG/M2 | DIASTOLIC BLOOD PRESSURE: 82 MMHG | HEART RATE: 77 BPM | OXYGEN SATURATION: 95 %

## 2019-07-03 DIAGNOSIS — F32.9 REACTIVE DEPRESSION: ICD-10-CM

## 2019-07-03 DIAGNOSIS — I10 BENIGN ESSENTIAL HTN: Primary | ICD-10-CM

## 2019-07-03 DIAGNOSIS — E78.2 MIXED HYPERLIPIDEMIA: ICD-10-CM

## 2019-07-03 DIAGNOSIS — I95.9 HYPOTENSION, UNSPECIFIED HYPOTENSION TYPE: ICD-10-CM

## 2019-07-03 DIAGNOSIS — E55.9 VITAMIN D DEFICIENCY: ICD-10-CM

## 2019-07-03 LAB
CREATININE URINE: 55.3 MG/DL (ref 28–259)
HBA1C MFR BLD: 7.2 %
MICROALBUMIN UR-MCNC: <1.2 MG/DL
MICROALBUMIN/CREAT UR-RTO: NORMAL MG/G (ref 0–30)
VITAMIN D 25-HYDROXY: 54.4 NG/ML

## 2019-07-03 PROCEDURE — 83036 HEMOGLOBIN GLYCOSYLATED A1C: CPT | Performed by: FAMILY MEDICINE

## 2019-07-03 PROCEDURE — 99214 OFFICE O/P EST MOD 30 MIN: CPT | Performed by: FAMILY MEDICINE

## 2019-07-03 PROCEDURE — 36415 COLL VENOUS BLD VENIPUNCTURE: CPT | Performed by: FAMILY MEDICINE

## 2019-07-03 NOTE — PROGRESS NOTES
Chief Complaint   Patient presents with    Diabetes    Hypertension    Hyperlipidemia    Other     pt has multiple medical complaints        HPI:  Angela Colunga is a 60 y.o. (: 1955) here today for 4 month follow up. Treatment Adherence:   Medication compliance:  compliant all of the time  Diet compliance:  noncompliant: enjoys ice cream  Weight trend: stable  Current exercise: no regular exercise  Barriers: none    Diabetes Mellitus Type 2: Current symptoms/problems include none. Home blood sugar records: patient says some weeks she'll test her BS every day and then the next week she won't check it at all. She usually checks it fasting in the morning & later in the day if she feels like its off. Any episodes of hypoglycemia? no  Eye exam current (within one year): no - last eye exam May 2018. Was negative for retinopathy. Tobacco history: She  reports that she quit smoking about 36 years ago. Her smoking use included cigarettes. She has a 8.00 pack-year smoking history. She has never used smokeless tobacco.   Daily Aspirin? Yes    Hypertension:  Home blood pressure monitoring: Yes - daily. Pt stopped taking the lisinopril 2.5 mg daily because she noticed that her BP was running in the 90's over 60's while she was on it & she felt very fatigued when her BP was that low. This morning her BP was 127/80. She is not adherent to a low sodium diet. Patient denies chest pain, shortness of breath, lightheadedness, blurred vision, peripheral edema and palpitations. Antihypertensive medication side effects: no medication side effects noted. Use of agents associated with hypertension: none. Hyperlipidemia:  No new myalgias or GI upset on rosuvastatin (Crestor).        Lab Results   Component Value Date    LABA1C 7.8 2019    LABA1C 7.6 2018    LABA1C 7.0 2018     Lab Results   Component Value Date    LABMICR <1.20 2018    CREATININE 0.9 2018     Lab Results signed by Aminah Jerome MA on 7/3/2019 at 8:42 AM      Provider attestation:     I, Dr. Dorothye Curling, personally performed the services described in this documentation, as scribed by the above signed scribe in my presence, and it is both accurate and complete. I agree with the ROS and Past Histories independently gathered by the clinical support staff and the remaining scribed note accurately describes my personal service to the patient.       7/3/2019    9:36 AM

## 2019-07-08 ENCOUNTER — TELEPHONE (OUTPATIENT)
Dept: FAMILY MEDICINE CLINIC | Age: 64
End: 2019-07-08

## 2019-10-17 ENCOUNTER — TELEPHONE (OUTPATIENT)
Dept: FAMILY MEDICINE CLINIC | Age: 64
End: 2019-10-17

## 2019-10-22 ENCOUNTER — TELEPHONE (OUTPATIENT)
Dept: FAMILY MEDICINE CLINIC | Age: 64
End: 2019-10-22

## 2019-11-05 ENCOUNTER — OFFICE VISIT (OUTPATIENT)
Dept: FAMILY MEDICINE CLINIC | Age: 64
End: 2019-11-05
Payer: COMMERCIAL

## 2019-11-05 VITALS
WEIGHT: 224.4 LBS | OXYGEN SATURATION: 98 % | SYSTOLIC BLOOD PRESSURE: 167 MMHG | BODY MASS INDEX: 40.39 KG/M2 | DIASTOLIC BLOOD PRESSURE: 103 MMHG | HEART RATE: 84 BPM

## 2019-11-05 DIAGNOSIS — R09.89 CAROTID BRUIT, UNSPECIFIED LATERALITY: ICD-10-CM

## 2019-11-05 DIAGNOSIS — E78.2 MIXED HYPERLIPIDEMIA: ICD-10-CM

## 2019-11-05 DIAGNOSIS — I10 ELEVATED BLOOD PRESSURE READING IN OFFICE WITH DIAGNOSIS OF HYPERTENSION: ICD-10-CM

## 2019-11-05 DIAGNOSIS — I10 BENIGN ESSENTIAL HTN: ICD-10-CM

## 2019-11-05 DIAGNOSIS — R03.1 BLOOD PRESSURE DECREASED: ICD-10-CM

## 2019-11-05 DIAGNOSIS — E55.9 VITAMIN D DEFICIENCY: ICD-10-CM

## 2019-11-05 DIAGNOSIS — F32.9 REACTIVE DEPRESSION: ICD-10-CM

## 2019-11-05 PROBLEM — I95.9 HYPOTENSION: Status: RESOLVED | Noted: 2019-07-03 | Resolved: 2019-11-05

## 2019-11-05 LAB — HBA1C MFR BLD: 7.2 %

## 2019-11-05 PROCEDURE — 83036 HEMOGLOBIN GLYCOSYLATED A1C: CPT | Performed by: FAMILY MEDICINE

## 2019-11-05 PROCEDURE — 99214 OFFICE O/P EST MOD 30 MIN: CPT | Performed by: FAMILY MEDICINE

## 2019-11-05 RX ORDER — LISINOPRIL 5 MG/1
5 TABLET ORAL DAILY
Qty: 30 TABLET | Refills: 11 | Status: SHIPPED | OUTPATIENT
Start: 2019-11-05 | End: 2020-11-13

## 2019-11-18 RX ORDER — GLIMEPIRIDE 4 MG/1
TABLET ORAL
Qty: 60 TABLET | Refills: 11 | Status: SHIPPED | OUTPATIENT
Start: 2019-11-18 | End: 2019-12-24

## 2019-12-10 ENCOUNTER — TELEPHONE (OUTPATIENT)
Dept: FAMILY MEDICINE CLINIC | Age: 64
End: 2019-12-10

## 2019-12-11 ENCOUNTER — OFFICE VISIT (OUTPATIENT)
Dept: FAMILY MEDICINE CLINIC | Age: 64
End: 2019-12-11
Payer: COMMERCIAL

## 2019-12-11 VITALS
WEIGHT: 222 LBS | HEART RATE: 86 BPM | DIASTOLIC BLOOD PRESSURE: 86 MMHG | SYSTOLIC BLOOD PRESSURE: 126 MMHG | TEMPERATURE: 98 F | OXYGEN SATURATION: 96 % | BODY MASS INDEX: 39.96 KG/M2

## 2019-12-11 DIAGNOSIS — B96.89 ACUTE BACTERIAL BRONCHITIS: Primary | ICD-10-CM

## 2019-12-11 DIAGNOSIS — J20.8 ACUTE BACTERIAL BRONCHITIS: Primary | ICD-10-CM

## 2019-12-11 DIAGNOSIS — R09.89 CHEST CONGESTION: ICD-10-CM

## 2019-12-11 DIAGNOSIS — R05.9 COUGH: ICD-10-CM

## 2019-12-11 DIAGNOSIS — J02.9 SORE THROAT: ICD-10-CM

## 2019-12-11 DIAGNOSIS — I10 BENIGN ESSENTIAL HTN: ICD-10-CM

## 2019-12-11 PROCEDURE — 87880 STREP A ASSAY W/OPTIC: CPT | Performed by: FAMILY MEDICINE

## 2019-12-11 PROCEDURE — 87804 INFLUENZA ASSAY W/OPTIC: CPT | Performed by: FAMILY MEDICINE

## 2019-12-11 PROCEDURE — 99213 OFFICE O/P EST LOW 20 MIN: CPT | Performed by: FAMILY MEDICINE

## 2019-12-11 RX ORDER — METHYLPREDNISOLONE 4 MG/1
TABLET ORAL
Qty: 1 KIT | Refills: 0 | Status: SHIPPED | OUTPATIENT
Start: 2019-12-11 | End: 2019-12-17

## 2019-12-11 RX ORDER — DOXYCYCLINE HYCLATE 100 MG
100 TABLET ORAL 2 TIMES DAILY
Qty: 20 TABLET | Refills: 0 | Status: SHIPPED | OUTPATIENT
Start: 2019-12-11 | End: 2019-12-21

## 2019-12-12 LAB
INFLUENZA A ANTIGEN, POC: NEGATIVE
INFLUENZA B ANTIGEN, POC: NEGATIVE
S PYO AG THROAT QL: NORMAL

## 2019-12-17 ENCOUNTER — TELEPHONE (OUTPATIENT)
Dept: FAMILY MEDICINE CLINIC | Age: 64
End: 2019-12-17

## 2020-02-25 ENCOUNTER — OFFICE VISIT (OUTPATIENT)
Dept: FAMILY MEDICINE CLINIC | Age: 65
End: 2020-02-25
Payer: COMMERCIAL

## 2020-02-25 VITALS
WEIGHT: 221 LBS | OXYGEN SATURATION: 96 % | HEART RATE: 94 BPM | BODY MASS INDEX: 39.16 KG/M2 | SYSTOLIC BLOOD PRESSURE: 124 MMHG | HEIGHT: 63 IN | DIASTOLIC BLOOD PRESSURE: 80 MMHG

## 2020-02-25 PROBLEM — R09.89 DECREASED RADIAL PULSE: Status: ACTIVE | Noted: 2020-02-25

## 2020-02-25 LAB
A/G RATIO: 1.5 (ref 1.1–2.2)
ALBUMIN SERPL-MCNC: 4.9 G/DL (ref 3.4–5)
ALP BLD-CCNC: 100 U/L (ref 40–129)
ALT SERPL-CCNC: 42 U/L (ref 10–40)
ANION GAP SERPL CALCULATED.3IONS-SCNC: 16 MMOL/L (ref 3–16)
AST SERPL-CCNC: 29 U/L (ref 15–37)
BILIRUB SERPL-MCNC: 0.3 MG/DL (ref 0–1)
BUN BLDV-MCNC: 16 MG/DL (ref 7–20)
CALCIUM SERPL-MCNC: 9.6 MG/DL (ref 8.3–10.6)
CHLORIDE BLD-SCNC: 97 MMOL/L (ref 99–110)
CHOLESTEROL, TOTAL: 261 MG/DL (ref 0–199)
CO2: 26 MMOL/L (ref 21–32)
CREAT SERPL-MCNC: 0.8 MG/DL (ref 0.6–1.2)
GFR AFRICAN AMERICAN: >60
GFR NON-AFRICAN AMERICAN: >60
GLOBULIN: 3.2 G/DL
GLUCOSE BLD-MCNC: 117 MG/DL (ref 70–99)
HBA1C MFR BLD: 7 %
HDLC SERPL-MCNC: 57 MG/DL (ref 40–60)
LDL CHOLESTEROL CALCULATED: ABNORMAL MG/DL
LDL CHOLESTEROL DIRECT: 173 MG/DL
POTASSIUM SERPL-SCNC: 4.5 MMOL/L (ref 3.5–5.1)
SODIUM BLD-SCNC: 139 MMOL/L (ref 136–145)
TOTAL PROTEIN: 8.1 G/DL (ref 6.4–8.2)
TRIGL SERPL-MCNC: 321 MG/DL (ref 0–150)
VLDLC SERPL CALC-MCNC: ABNORMAL MG/DL

## 2020-02-25 PROCEDURE — 99214 OFFICE O/P EST MOD 30 MIN: CPT | Performed by: FAMILY MEDICINE

## 2020-02-25 PROCEDURE — 36415 COLL VENOUS BLD VENIPUNCTURE: CPT | Performed by: FAMILY MEDICINE

## 2020-02-25 PROCEDURE — 83036 HEMOGLOBIN GLYCOSYLATED A1C: CPT | Performed by: FAMILY MEDICINE

## 2020-02-25 PROCEDURE — 3051F HG A1C>EQUAL 7.0%<8.0%: CPT | Performed by: FAMILY MEDICINE

## 2020-02-25 NOTE — PATIENT INSTRUCTIONS
2+ right radial pulse  1+ left radial  Patient should call the office immediately with new or ongoing signs or symptoms or worsening, or proceed to the emergency room. If you are on medications which could impair your senses, you are at risk of weakness, falls, dizziness, or drowsiness. You should be careful during activities which could place you at risk of harm, such as climbing, using stairs, operating machinery, or driving vehicles. If you feel you cannot safely do these activities, you should request others to help you, or avoid the activities altogether. If you are drowsy for any other reason, you should use the same precautions as listed above.

## 2020-02-25 NOTE — PROGRESS NOTES
Chief Complaint   Patient presents with    Hypertension    Hyperlipidemia    Diabetes    Other     pt has multiple medical complaints        HPI:  Tyler Nicholson is a 63 y.o. (: 1955) here today for 3 month follow up for management of chronic conditions. Pt reports that her left knee has been bothering her - especially if she's up walking for a couple hours. She describes it as a tingling/pulling sensation that radiates down the medial side of her leg. Pt says it was even hurting when she laid down at night and had to use ibuprofen to be able to get to sleep. Treatment Adherence:   Medication compliance:  compliant all of the time  Diet compliance:  compliant most of the time - she's doing the Keto diet still   Weight trend: stable   Current exercise: no regular exercise  Barriers: impairment:  physical: pt says her knee has been bothering her which limits her physical activity    Diabetes Mellitus Type 2: Current symptoms/problems include polyuria. Home blood sugar records: patient does not test  - her glucometer needs a battery so she hasn't been checking her BS lately. Any episodes of hypoglycemia? yes - pt is basing this on her symptoms   Eye exam current (within one year): no - going for eye exam in April   Tobacco history: She  reports that she quit smoking about 36 years ago. Her smoking use included cigarettes. She has a 8.00 pack-year smoking history. She has never used smokeless tobacco.   Daily Aspirin? Yes    Hypertension:  Home blood pressure monitoring: No.  She is adherent to a low sodium diet. Patient denies chest pain, shortness of breath, headache, blurred vision, peripheral edema, palpitations and dry cough. Antihypertensive medication side effects: no medication side effects noted. Use of agents associated with hypertension: none. Hyperlipidemia:  No new myalgias or GI upset on rosuvastatin (Crestor).        Lab Results   Component Value Date    LABA1C 7.2 11/05/2019    LABA1C 7.2 07/03/2019    LABA1C 7.8 02/19/2019     Lab Results   Component Value Date    LABMICR <1.20 07/03/2019    CREATININE 0.9 07/27/2018     Lab Results   Component Value Date    ALT 24 02/19/2019    AST 20 02/19/2019     Lab Results   Component Value Date    CHOL 175 02/19/2019    TRIG 162 (H) 02/19/2019    HDL 56 02/19/2019    LDLCALC 87 02/19/2019    LDLDIRECT 246 (H) 10/27/2015          Patient's medications, allergies, past medical, surgical, social and family histories were reviewed and updated asappropriate on 2/25/2020 at 2:17 PM.    ROS:  Review of Systems    All other systems reviewed and are negative except as noted above on 2/25/2020 at 2:17 PM. Additional review of systems may be scanned into the media section ofthis medical record. Any responses requiring further intervention were pursued.     Hemoglobin A1C (%)   Date Value   11/05/2019 7.2     Microalbumin, Random Urine (mg/dL)   Date Value   07/03/2019 <1.20     LDL Calculated (mg/dL)   Date Value   02/19/2019 87     Past Medical History:   Diagnosis Date    HTN (hypertension)     Hyperlipidemia     No history of procedure 2/5/16    colonoscopy    Type II or unspecified type diabetes mellitus without mention of complication, not stated as uncontrolled         Family History   Problem Relation Age of Onset    Diabetes Mother     Kidney Disease Mother     Heart Disease Mother     Heart Disease Father     High Blood Pressure Father      Social History     Socioeconomic History    Marital status:      Spouse name: Not on file    Number of children: Not on file    Years of education: Not on file    Highest education level: Not on file   Occupational History    Not on file   Social Needs    Financial resource strain: Not on file    Food insecurity:     Worry: Not on file     Inability: Not on file    Transportation needs:     Medical: Not on file     Non-medical: Not on file   Tobacco Use    Smoking status: Former Smoker     Packs/day: 1.00     Years: 8.00     Pack years: 8.00     Types: Cigarettes     Last attempt to quit: 1983     Years since quittin.6    Smokeless tobacco: Never Used    Tobacco comment: pt has passive smoke exposure - spouse smokes in the house   Substance and Sexual Activity    Alcohol use: No     Alcohol/week: 0.0 standard drinks    Drug use: No    Sexual activity: Yes     Partners: Male   Lifestyle    Physical activity:     Days per week: Not on file     Minutes per session: Not on file    Stress: Not on file   Relationships    Social connections:     Talks on phone: Not on file     Gets together: Not on file     Attends [employer] service: Not on file     Active member of club or organization: Not on file     Attends meetings of clubs or organizations: Not on file     Relationship status: Not on file    Intimate partner violence:     Fear of current or ex partner: Not on file     Emotionally abused: Not on file     Physically abused: Not on file     Forced sexual activity: Not on file   Other Topics Concern    Not on file   Social History Narrative    Not on file       Prior to Visit Medications    Medication Sig Taking?  Authorizing Provider   glimepiride (AMARYL) 4 MG tablet TAKE ONE TABLET BY MOUTH TWICE A DAY  Patient taking differently: Take 2 mg by mouth 2 times daily  Yes JEANETTE Shields CNP   lisinopril (PRINIVIL;ZESTRIL) 5 MG tablet Take 1 tablet by mouth daily  Patient taking differently: Take 2.5 mg by mouth daily  Yes Charmayne Dasen, MD   rosuvastatin (CRESTOR) 5 MG tablet TAKE ONE TABLET BY MOUTH ONCE NIGHTLY Yes Charmayne Dasen, MD   INVOKANA 300 MG TABS tablet Margette Abena MOUTH EVERY MORNING BEFORE BREAKFAST Yes Charmayne Dasen, MD   vitamin D-3 (CHOLECALCIFEROL) 5000 units TABS Take 1 tablet by mouth daily Yes Charmayne Dasen, MD   Omega-3 Fatty Acids (OMEGA 3 PO) Take by mouth Yes Historical Provider, MD fluticasone (FLONASE) 50 MCG/ACT nasal spray In each nostril daily Yes Chacho Hallman MD   cetirizine (ZYRTEC ALLERGY) 10 MG tablet Take 1 tablet by mouth daily Yes Chacho Hallman MD   calcium carbonate (OSCAL) 500 MG TABS tablet Take 2 tablets by mouth daily Yes Chacho Hallman MD   aspirin 81 MG tablet Take 81 mg by mouth daily. Yes Historical Provider, MD     Allergies   Allergen Reactions    Codeine     Fenofibrate      Skin turns red and get hives    Metformin And Related      She states it caused joint pain. She tried it 3 times and had same reaction. OBJECTIVE:  Estimated body mass index is 39.78 kg/m² as calculated from the following:    Height as of this encounter: 5' 2.5\" (1.588 m). Weight as of this encounter: 221 lb (100.2 kg). Vitals:    02/25/20 1350   BP: 124/80   Site: Left Upper Arm   Position: Sitting   Cuff Size: Large Adult   Pulse: 94   SpO2: 96%   Weight: 221 lb (100.2 kg)   Height: 5' 2.5\" (1.588 m)     BP Readings from Last 2 Encounters:   02/25/20 124/80   12/11/19 126/86     Wt Readings from Last 3 Encounters:   02/25/20 221 lb (100.2 kg)   12/11/19 222 lb (100.7 kg)   11/05/19 224 lb 6.4 oz (101.8 kg)       Physical Exam  Vitals signs and nursing note reviewed. Constitutional:       General: She is not in acute distress. Appearance: She is well-developed. She is not diaphoretic. HENT:      Head: Normocephalic and atraumatic. Right Ear: External ear normal.      Left Ear: External ear normal.      Nose: Nose normal.   Eyes:      General: Lids are normal. No scleral icterus. Right eye: No discharge. Left eye: No discharge. Pupils: Pupils are equal, round, and reactive to light. Neck:      Thyroid: No thyromegaly. Vascular: No JVD. Cardiovascular:      Rate and Rhythm: Normal rate and regular rhythm. Pulses:           Radial pulses are 2+ on the right side and 1+ on the left side.       Heart sounds:

## 2020-03-24 ENCOUNTER — HOSPITAL ENCOUNTER (OUTPATIENT)
Dept: VASCULAR LAB | Age: 65
Discharge: HOME | End: 2020-03-24
Payer: COMMERCIAL

## 2020-03-24 PROCEDURE — 93931 UPPER EXTREMITY STUDY: CPT

## 2020-05-15 RX ORDER — CANAGLIFLOZIN 300 MG/1
TABLET, FILM COATED ORAL
Qty: 30 TABLET | Refills: 9 | Status: SHIPPED | OUTPATIENT
Start: 2020-05-15 | End: 2020-08-17

## 2020-05-15 RX ORDER — ROSUVASTATIN CALCIUM 5 MG/1
TABLET, COATED ORAL
Qty: 30 TABLET | Refills: 9 | Status: SHIPPED | OUTPATIENT
Start: 2020-05-15 | End: 2021-07-12

## 2020-07-29 ENCOUNTER — TELEPHONE (OUTPATIENT)
Dept: ADMINISTRATIVE | Age: 65
End: 2020-07-29

## 2020-07-30 NOTE — TELEPHONE ENCOUNTER
Insurance does not mention any other alternatives, only for the patient to contact the insurance company.   Please ask her to do so to find alternatives to [facility]

## 2020-07-31 RX ORDER — EMPAGLIFLOZIN 25 MG/1
1 TABLET, FILM COATED ORAL DAILY
Qty: 30 TABLET | Refills: 5 | Status: SHIPPED | OUTPATIENT
Start: 2020-07-31 | End: 2021-03-15

## 2020-08-03 ENCOUNTER — TELEPHONE (OUTPATIENT)
Dept: ADMINISTRATIVE | Age: 65
End: 2020-08-03

## 2020-08-03 NOTE — TELEPHONE ENCOUNTER
PA SUBMITTED VIA CMM FOR   Jardiance 25MG tablets  Key: [id number] - PA Case ID: [id number] - Rx #: [id number]   STATUS: PENDING

## 2020-08-17 ENCOUNTER — OFFICE VISIT (OUTPATIENT)
Dept: FAMILY MEDICINE CLINIC | Age: 65
End: 2020-08-17
Payer: COMMERCIAL

## 2020-08-17 VITALS
TEMPERATURE: 98 F | BODY MASS INDEX: 40.33 KG/M2 | DIASTOLIC BLOOD PRESSURE: 78 MMHG | HEIGHT: 63 IN | HEART RATE: 81 BPM | OXYGEN SATURATION: 96 % | WEIGHT: 227.6 LBS | SYSTOLIC BLOOD PRESSURE: 130 MMHG

## 2020-08-17 PROBLEM — M17.12 PRIMARY OSTEOARTHRITIS OF LEFT KNEE: Status: ACTIVE | Noted: 2020-08-17

## 2020-08-17 PROBLEM — I77.1 SUBCLAVIAN ARTERY STENOSIS, LEFT (HCC): Status: ACTIVE | Noted: 2020-08-17

## 2020-08-17 PROCEDURE — 99214 OFFICE O/P EST MOD 30 MIN: CPT | Performed by: FAMILY MEDICINE

## 2020-08-17 PROCEDURE — 36415 COLL VENOUS BLD VENIPUNCTURE: CPT | Performed by: FAMILY MEDICINE

## 2020-08-17 PROCEDURE — 3051F HG A1C>EQUAL 7.0%<8.0%: CPT | Performed by: FAMILY MEDICINE

## 2020-08-17 RX ORDER — EMPAGLIFLOZIN 25 MG/1
25 TABLET, FILM COATED ORAL DAILY
COMMUNITY
End: 2020-08-17 | Stop reason: SDUPTHER

## 2020-08-17 NOTE — PROGRESS NOTES
Chief Complaint   Patient presents with    Diabetes    Hypertension    Hyperlipidemia    Other     patient has multiple medical compaints   Patient did not see Dr Agnes Kamara because her knee got better. Her arm symptoms are no worse. She is taking two 81 mg aspirin. She wanted COVID 19 antibody test she will check with her insurance. Patient agrees to have mammogram  HPI:  Lynn Hamilton is a 63 y.o. (: 1955) here today for  Treatment Adherence:   Medication compliance:  compliant all of the time  Diet compliance:  compliant all of the time  Weight trend: increasing  Current exercise: no regular exercise  Barriers: physical-knee pain    Diabetes Mellitus Type 2: Current symptoms/problems include none. Home blood sugar records: fasting range:   Any episodes of hypoglycemia? no  Eye exam current (within one year): no  Tobacco history: She  reports that she quit smoking about 37 years ago. Her smoking use included cigarettes. She has a 8.00 pack-year smoking history. She has never used smokeless tobacco.   Daily Aspirin? Yes    Hypertension:  Home blood pressure monitoring: Yes - runs good. She is adherent to a low sodium diet. Patient denies chest pain and shortness of breath. Antihypertensive medication side effects: no medication side effects noted. Use of agents associated with hypertension: none. Hyperlipidemia:  No new myalgias or GI upset on rosuvastatin (Crestor).        Lab Results   Component Value Date    LABA1C 7.0 2020    LABA1C 7.2 2019    LABA1C 7.2 2019     Lab Results   Component Value Date    LABMICR <1.20 2019    CREATININE 0.8 2020     Lab Results   Component Value Date    ALT 42 (H) 2020    AST 29 2020     Lab Results   Component Value Date    CHOL 261 (H) 2020    TRIG 321 (H) 2020    HDL 57 2020    LDLCALC see below 2020    LDLDIRECT 173 (H) 2020          Patient's medications, allergies, past medical, surgical, social and family histories were reviewed and updated asappropriate on 2020 at 11:52 AM.    ROS:  Review of Systems    All other systems reviewed and are negative except as noted above on 2020 at 11:52 AM. Additional review of systems may be scanned into the media section ofthis medical record. Any responses requiring further intervention were pursued.     Hemoglobin A1C (%)   Date Value   2020 7.0     Microalbumin, Random Urine (mg/dL)   Date Value   2019 <1.20     LDL Calculated (mg/dL)   Date Value   2020 see below     Past Medical History:   Diagnosis Date    HTN (hypertension)     Hyperlipidemia     No history of procedure 16    colonoscopy    Type II or unspecified type diabetes mellitus without mention of complication, not stated as uncontrolled         Family History   Problem Relation Age of Onset    Diabetes Mother     Kidney Disease Mother     Heart Disease Mother     Heart Disease Father     High Blood Pressure Father      Social History     Socioeconomic History    Marital status:      Spouse name: Not on file    Number of children: Not on file    Years of education: Not on file    Highest education level: Not on file   Occupational History    Not on file   Social Needs    Financial resource strain: Not on file    Food insecurity     Worry: Not on file     Inability: Not on file    Transportation needs     Medical: Not on file     Non-medical: Not on file   Tobacco Use    Smoking status: Former Smoker     Packs/day: 1.00     Years: 8.00     Pack years: 8.00     Types: Cigarettes     Last attempt to quit: 1983     Years since quittin.1    Smokeless tobacco: Never Used    Tobacco comment: pt has passive smoke exposure - spouse smokes in the house   Substance and Sexual Activity    Alcohol use: No     Alcohol/week: 0.0 standard drinks    Drug use: No    Sexual activity: Yes     Partners: Male   Lifestyle    Physical activity     Days per week: Not on file     Minutes per session: Not on file    Stress: Not on file   Relationships    Social connections     Talks on phone: Not on file     Gets together: Not on file     Attends Worship service: Not on file     Active member of club or organization: Not on file     Attends meetings of clubs or organizations: Not on file     Relationship status: Not on file    Intimate partner violence     Fear of current or ex partner: Not on file     Emotionally abused: Not on file     Physically abused: Not on file     Forced sexual activity: Not on file   Other Topics Concern    Not on file   Social History Narrative    Not on file       Prior to Visit Medications    Medication Sig Taking? Authorizing Provider   empagliflozin (JARDIANCE) 25 MG tablet Take 1 tablet by mouth daily Yes Salvatore Navarrete MD   rosuvastatin (CRESTOR) 5 MG tablet TAKE ONE TABLET BY MOUTH ONCE NIGHTLY Yes Salvatore Navarrete MD   glimepiride (AMARYL) 4 MG tablet TAKE ONE TABLET BY MOUTH TWICE A DAY  Patient taking differently: Take 2 mg by mouth 2 times daily  Yes JEANETTE Ramirez - CNP   lisinopril (PRINIVIL;ZESTRIL) 5 MG tablet Take 1 tablet by mouth daily Yes Salvatore Navarrete MD   vitamin D-3 (CHOLECALCIFEROL) 5000 units TABS Take 1 tablet by mouth daily Yes Salvatore Navarrete MD   Omega-3 Fatty Acids (OMEGA 3 PO) Take by mouth Yes Historical Provider, MD   fluticasone (FLONASE) 50 MCG/ACT nasal spray In each nostril daily Yes Salvatore Navarrete MD   cetirizine (ZYRTEC ALLERGY) 10 MG tablet Take 1 tablet by mouth daily Yes Salvatore Navarrete MD   calcium carbonate (OSCAL) 500 MG TABS tablet Take 2 tablets by mouth daily Yes Salvatore Navarrete MD   aspirin 81 MG tablet Take 81 mg by mouth daily.  Yes Historical Provider, MD     Allergies   Allergen Reactions    Codeine     Fenofibrate      Skin turns red and get hives    Metformin And Related She states it caused joint pain. She tried it 3 times and had same reaction. OBJECTIVE:  Estimated body mass index is 40.97 kg/m² as calculated from the following:    Height as of this encounter: 5' 2.5\" (1.588 m). Weight as of this encounter: 227 lb 9.6 oz (103.2 kg). Vitals:    20 1143   BP: 130/78   Site: Right Upper Arm   Position: Sitting   Cuff Size: Medium Adult   Pulse: 81   Temp: 98 °F (36.7 °C)   TempSrc: Temporal   SpO2: 96%   Weight: 227 lb 9.6 oz (103.2 kg)   Height: 5' 2.5\" (1.588 m)     BP Readings from Last 2 Encounters:   20 130/78   20 124/80     Wt Readings from Last 3 Encounters:   20 227 lb 9.6 oz (103.2 kg)   20 221 lb (100.2 kg)   19 222 lb (100.7 kg)       Physical Exam  Vitals signs and nursing note reviewed. Constitutional:       General: She is not in acute distress. Appearance: She is well-developed. She is not diaphoretic. HENT:      Head: Normocephalic and atraumatic. Right Ear: External ear normal.      Left Ear: External ear normal.      Nose: Nose normal.   Eyes:      General: Lids are normal. No scleral icterus. Right eye: No discharge. Left eye: No discharge. Pupils: Pupils are equal, round, and reactive to light. Neck:      Thyroid: No thyromegaly. Vascular: No JVD. Cardiovascular:      Rate and Rhythm: Normal rate and regular rhythm. Pulses:           Radial pulses are 2+ on the right side and 1+ on the left side. Heart sounds: Normal heart sounds. Pulmonary:      Effort: Pulmonary effort is normal. No respiratory distress. Breath sounds: Normal breath sounds. Abdominal:      Palpations: Abdomen is soft. There is no hepatomegaly or splenomegaly. Tenderness: There is no abdominal tenderness. Musculoskeletal:      Right lower le+ Edema present. Left lower le+ Edema present. Skin:     General: Skin is warm and dry. Coloration: Skin is not pale. Findings: No erythema or rash. Comments: Turgor normal   Psychiatric:         Behavior: Behavior normal.         Thought Content: Thought content normal.         Judgment: Judgment normal.              ASSESSMENT PLAN      Diagnosis Orders   1. Uncontrolled diabetes mellitus type 2 without complications (HCC)  Microalbumin / Creatinine Urine Ratio    Hemoglobin A1C   2. Screening for breast cancer  ELROY DIGITAL SCREEN W OR WO CAD BILATERAL   3. Stenosis of carotid artery, unspecified laterality  US CAROTID ARTERY BILATERAL   4. Benign essential HTN     5. Vitamin D deficiency     6. Mixed hyperlipidemia     7. Subclavian artery stenosis, left (HCC)     8. Primary osteoarthritis of left knee     Sugars seem to be running fine since she has been switched by insurance from [employer] to [employer]. Update all of her labs. Mammogram will do. Carotid Doppler due from 2 years ago. Blood pressure acceptable. Vitamin D levels will be monitored as needed continue lipid monitoring as needed. Remains asymptomatic from the left subclavian artery stenosis. Hand is warm. She thinks she can live with the arthritis in her knee for now. Follow-up virtual 6 months. Patient should call the office immediately with new or ongoing signs or symptoms or worsening, or proceed to the emergency room. No changes in past medical history, past surgical history, social history, or family history were noted during the patient encounter unless specifically listed above. All updates of past medical history, past surgical history, social history, or family history were reviewed personally by me during the office visit. All problems listed in the assessment are stable unless noted otherwise. Medication profile reviewed personally by me during the visit. Medication side effects and possible impairments from medications were discussed as applicable.     This document was prepared by a combination of typing and transcription through a voice recognition software. Scribe attestation: Jomar Lemon RN, am scribing for and in the presence of Nikia Mcintyre MD. Electronically signed by Arely Gomez RN on 8/17/2020 at 11:52 AM      Provider attestation:     I, Dr. Megha Segal, personally performed the services described in this documentation, as scribed by the above signed scribe in my presence, and it is both accurate and complete. I agree with the ROS and Past Histories independently gathered by the clinical support staff and the remaining scribed note accurately describes my personal service to the patient.       8/17/2020    1:02 PM

## 2020-08-18 LAB
CREATININE URINE: 24.6 MG/DL (ref 28–259)
ESTIMATED AVERAGE GLUCOSE: 154.2 MG/DL
HBA1C MFR BLD: 7 %
MICROALBUMIN UR-MCNC: <1.2 MG/DL
MICROALBUMIN/CREAT UR-RTO: ABNORMAL MG/G (ref 0–30)

## 2020-10-05 ENCOUNTER — APPOINTMENT (OUTPATIENT)
Dept: CT IMAGING | Age: 65
End: 2020-10-05
Payer: COMMERCIAL

## 2020-10-05 ENCOUNTER — HOSPITAL ENCOUNTER (EMERGENCY)
Age: 65
Discharge: HOME | End: 2020-10-06
Attending: STUDENT IN AN ORGANIZED HEALTH CARE EDUCATION/TRAINING PROGRAM
Payer: COMMERCIAL

## 2020-10-05 LAB
A/G RATIO: 1.5 (ref 1.1–2.2)
ALBUMIN SERPL-MCNC: 4.7 G/DL (ref 3.4–5)
ALP BLD-CCNC: 94 U/L (ref 40–129)
ALT SERPL-CCNC: 28 U/L (ref 10–40)
ANION GAP SERPL CALCULATED.3IONS-SCNC: 12 MMOL/L (ref 3–16)
AST SERPL-CCNC: 23 U/L (ref 15–37)
BASOPHILS ABSOLUTE: 0 K/UL (ref 0–0.2)
BASOPHILS RELATIVE PERCENT: 0.6 %
BILIRUB SERPL-MCNC: <0.2 MG/DL (ref 0–1)
BUN BLDV-MCNC: 27 MG/DL (ref 7–20)
CALCIUM SERPL-MCNC: 9.7 MG/DL (ref 8.3–10.6)
CHLORIDE BLD-SCNC: 97 MMOL/L (ref 99–110)
CO2: 27 MMOL/L (ref 21–32)
CREAT SERPL-MCNC: 1.1 MG/DL (ref 0.6–1.2)
EOSINOPHILS ABSOLUTE: 0.2 K/UL (ref 0–0.6)
EOSINOPHILS RELATIVE PERCENT: 2.5 %
GFR AFRICAN AMERICAN: >60
GFR NON-AFRICAN AMERICAN: 50
GLOBULIN: 3.2 G/DL
GLUCOSE BLD-MCNC: 135 MG/DL (ref 70–99)
HCT VFR BLD CALC: 42.8 % (ref 36–48)
HEMOGLOBIN: 14.5 G/DL (ref 12–16)
LACTIC ACID: 0.6 MMOL/L (ref 0.4–2)
LYMPHOCYTES ABSOLUTE: 2.5 K/UL (ref 1–5.1)
LYMPHOCYTES RELATIVE PERCENT: 32.9 %
MCH RBC QN AUTO: 28.5 PG (ref 26–34)
MCHC RBC AUTO-ENTMCNC: 33.8 G/DL (ref 31–36)
MCV RBC AUTO: 84.4 FL (ref 80–100)
MONOCYTES ABSOLUTE: 0.7 K/UL (ref 0–1.3)
MONOCYTES RELATIVE PERCENT: 9.4 %
NEUTROPHILS ABSOLUTE: 4.1 K/UL (ref 1.7–7.7)
NEUTROPHILS RELATIVE PERCENT: 54.6 %
PDW BLD-RTO: 14.5 % (ref 12.4–15.4)
PLATELET # BLD: 204 K/UL (ref 135–450)
PMV BLD AUTO: 8.5 FL (ref 5–10.5)
POTASSIUM REFLEX MAGNESIUM: 4 MMOL/L (ref 3.5–5.1)
RBC # BLD: 5.07 M/UL (ref 4–5.2)
SODIUM BLD-SCNC: 136 MMOL/L (ref 136–145)
TOTAL PROTEIN: 7.9 G/DL (ref 6.4–8.2)
TROPONIN: <0.01 NG/ML
WBC # BLD: 7.6 K/UL (ref 4–11)

## 2020-10-05 PROCEDURE — 83605 ASSAY OF LACTIC ACID: CPT

## 2020-10-05 PROCEDURE — 70498 CT ANGIOGRAPHY NECK: CPT

## 2020-10-05 PROCEDURE — 99284 EMERGENCY DEPT VISIT MOD MDM: CPT

## 2020-10-05 PROCEDURE — 85025 COMPLETE CBC W/AUTO DIFF WBC: CPT

## 2020-10-05 PROCEDURE — 6370000000 HC RX 637 (ALT 250 FOR IP): Performed by: PHYSICIAN ASSISTANT

## 2020-10-05 PROCEDURE — 6360000004 HC RX CONTRAST MEDICATION: Performed by: PHYSICIAN ASSISTANT

## 2020-10-05 PROCEDURE — 93005 ELECTROCARDIOGRAM TRACING: CPT | Performed by: PHYSICIAN ASSISTANT

## 2020-10-05 PROCEDURE — 80053 COMPREHEN METABOLIC PANEL: CPT

## 2020-10-05 PROCEDURE — 84484 ASSAY OF TROPONIN QUANT: CPT

## 2020-10-05 RX ADMIN — ASPIRIN 325 MG: 325 TABLET, COATED ORAL at 22:49

## 2020-10-05 RX ADMIN — IOPAMIDOL 85 ML: 755 INJECTION, SOLUTION INTRAVENOUS at 22:31

## 2020-10-05 ASSESSMENT — PAIN SCALES - GENERAL: PAINLEVEL_OUTOF10: 4

## 2020-10-06 VITALS
HEIGHT: 62 IN | HEART RATE: 82 BPM | DIASTOLIC BLOOD PRESSURE: 76 MMHG | TEMPERATURE: 97.4 F | WEIGHT: 220 LBS | OXYGEN SATURATION: 98 % | BODY MASS INDEX: 40.48 KG/M2 | SYSTOLIC BLOOD PRESSURE: 139 MMHG | RESPIRATION RATE: 21 BRPM

## 2020-10-06 LAB
EKG ATRIAL RATE: 75 BPM
EKG ATRIAL RATE: 75 BPM
EKG ATRIAL RATE: 78 BPM
EKG DIAGNOSIS: NORMAL
EKG P AXIS: 56 DEGREES
EKG P AXIS: 60 DEGREES
EKG P AXIS: 63 DEGREES
EKG P-R INTERVAL: 172 MS
EKG P-R INTERVAL: 176 MS
EKG P-R INTERVAL: 178 MS
EKG Q-T INTERVAL: 398 MS
EKG Q-T INTERVAL: 408 MS
EKG Q-T INTERVAL: 408 MS
EKG QRS DURATION: 68 MS
EKG QRS DURATION: 74 MS
EKG QRS DURATION: 74 MS
EKG QTC CALCULATION (BAZETT): 444 MS
EKG QTC CALCULATION (BAZETT): 455 MS
EKG QTC CALCULATION (BAZETT): 465 MS
EKG R AXIS: 6 DEGREES
EKG R AXIS: 8 DEGREES
EKG R AXIS: 8 DEGREES
EKG T AXIS: 31 DEGREES
EKG T AXIS: 34 DEGREES
EKG T AXIS: 43 DEGREES
EKG VENTRICULAR RATE: 75 BPM
EKG VENTRICULAR RATE: 75 BPM
EKG VENTRICULAR RATE: 78 BPM
TROPONIN: <0.01 NG/ML

## 2020-10-06 PROCEDURE — 93010 ELECTROCARDIOGRAM REPORT: CPT | Performed by: INTERNAL MEDICINE

## 2020-10-06 ASSESSMENT — PAIN SCALES - GENERAL: PAINLEVEL_OUTOF10: 3

## 2020-10-06 NOTE — ED TRIAGE NOTES
Pt states she has a lump that appeared on her neck tonight on her right side, she states she wanted to get it checked out because it came up so fast

## 2020-10-06 NOTE — ED PROVIDER NOTES
[address] [facility]  EMERGENCY DEPARTMENT ENCOUNTER        Pt Name: Dayne Pedersen  MRN: [id number]  [place] 1955  Date of evaluation: 10/5/2020  Provider: DAVIS Shen  PCP: Daniel Camejo MD    This patient was seen and evaluated by the attending physician Marie Root, [address] [facility]       Chief Complaint   Patient presents with    Neck Pain       HISTORY OF PRESENT ILLNESS   (Location/Symptom, Timing/Onset, Context/Setting, Quality, Duration, Modifying Factors, Severity)  Note limiting factors. Dayne Pedersen is a 63 y.o. female with PMhx HTN, HLD, DM, left subclavian artery stenosis who presents via private vehicle from her home for  Right neck pain and swelling. Onset was around 3 pm today. Duration has been since the onset. Context includes she notes she started feeling swelling to her right side upper neck into her jaw. It is a burning and numb like sensation. It is contstant. It hurts to touch. She denies ear pain or ear drainage. She denies nasal congestion but endorses rhinorrhea that she attributes to seasonal allergies and that has been getting better with intranasal steroids. She denies cough or fever. No neck injury. She notes at the same time she started feeling dizzy. She feels like when she moves her head forwards she is going to fall. This started at 3 as well. It only happens when she flexes her neck. She is supposed to follow up re her subclavian artery stenosis. Chart review, she had a carotid artery US ordered in August that has not been completed. Nursing Notes were all reviewed and agreed with or any disagreements were addressed  in the HPI. REVIEW OF SYSTEMS    (2-9 systems for level 4, 10 or more for level 5)     Review of Systems    Positives and Pertinent negatives as per HPI. Except as noted abovein the ROS, all other systems were reviewed and negative.        PAST MEDICAL HISTORY     Past Medical History:   Diagnosis Date  HTN (hypertension)     Hyperlipidemia     No history of procedure 2/5/16    colonoscopy    Type II or unspecified type diabetes mellitus without mention of complication, not stated as uncontrolled          SURGICAL HISTORY     Past Surgical History:   Procedure Laterality Date    COLONOSCOPY  02/05/2016    normal    TUBAL LIGATION           CURRENTMEDICATIONS       Previous Medications    ASPIRIN 81 MG TABLET    Take 162 mg by mouth daily     CALCIUM CARBONATE (OSCAL) 500 MG TABS TABLET    Take 2 tablets by mouth daily    CETIRIZINE (ZYRTEC ALLERGY) 10 MG TABLET    Take 1 tablet by mouth daily    EMPAGLIFLOZIN (JARDIANCE) 25 MG TABLET    Take 1 tablet by mouth daily    FLUTICASONE (FLONASE) 50 MCG/ACT NASAL SPRAY    In each nostril daily    GLIMEPIRIDE (AMARYL) 4 MG TABLET    TAKE ONE TABLET BY MOUTH TWICE A DAY    LISINOPRIL (PRINIVIL;ZESTRIL) 5 MG TABLET    Take 1 tablet by mouth daily    OMEGA-3 FATTY ACIDS (OMEGA 3 PO)    Take by mouth    ROSUVASTATIN (CRESTOR) 5 MG TABLET    TAKE ONE TABLET BY MOUTH ONCE NIGHTLY    VITAMIN D-3 (CHOLECALCIFEROL) 5000 UNITS TABS    Take 1 tablet by mouth daily         ALLERGIES     Codeine;  Fenofibrate; and Metformin and related    FAMILYHISTORY       Family History   Problem Relation Age of Onset    Diabetes Mother     Kidney Disease Mother     Heart Disease Mother     Heart Disease Father     High Blood Pressure Father           SOCIAL HISTORY       Social History     Socioeconomic History    Marital status:      Spouse name: None    Number of children: None    Years of education: None    Highest education level: None   Occupational History    None   Social Needs    Financial resource strain: None    Food insecurity     Worry: None     Inability: None    Transportation needs     Medical: None     Non-medical: None   Tobacco Use    Smoking status: Former Smoker     Packs/day: 1.00     Years: 8.00     Pack years: 8.00     Types: Cigarettes     Last attempt to quit: 1983     Years since quittin.2    Smokeless tobacco: Never Used    Tobacco comment: pt has passive smoke exposure - spouse smokes in the house   Substance and Sexual Activity    Alcohol use: No     Alcohol/week: 0.0 standard drinks    Drug use: No    Sexual activity: Yes     Partners: Male   Lifestyle    Physical activity     Days per week: None     Minutes per session: None    Stress: None   Relationships    Social connections     Talks on phone: None     Gets together: None     Attends Oriental orthodox service: None     Active member of club or organization: None     Attends meetings of clubs or organizations: None     Relationship status: None    Intimate partner violence     Fear of current or ex partner: None     Emotionally abused: None     Physically abused: None     Forced sexual activity: None   Other Topics Concern    None   Social History Narrative    None       SCREENINGS   NIH Stroke Scale  Interval: Baseline  Level of Consciousness (1a. ): Alert  LOC Questions (1b. ):  Answers both correctly  LOC Commands (1c. ): Performs both tasks correctly  Best Gaze (2. ): Normal  Visual (3. ): No visual loss  Facial Palsy (4. ): Normal symmetrical movement  Motor Arm, Left (5a. ): No drift  Motor Arm, Right (5b. ): No drift  Motor Leg, Left (6a. ): No drift  Motor Leg, Right (6b. ): No drift  Limb Ataxia (7. ): Absent  Sensory (8. ): Normal  Best Language (9. ): No aphasia  Dysarthria (10. ): Normal  Extinction and Inattention (11): No abnormality  Total: 0Glasgow Coma Scale  Eye Opening: Spontaneous  Best Verbal Response: Oriented  Best Motor Response: Obeys commands  Dwight Coma Scale Score: 15        PHYSICAL EXAM    (up to 7 for level 4, 8 or more for level 5)     ED Triage Vitals [10/05/20 2026]   BP Temp Temp Source Pulse Resp SpO2 Height Weight   (!) 165/85 97.6 °F (36.4 °C) Temporal 96 16 96 % 5' 2\" (1.575 m) 220 lb (99.8 kg)       Physical Exam  Vitals signs and nursing note tenderness. There is no guarding or rebound. Lymphadenopathy:      Cervical: No cervical adenopathy. Skin:     General: Skin is warm and dry. Capillary Refill: Capillary refill takes less than 2 seconds. Findings: No rash. Neurological:      Mental Status: She is alert, oriented to person, place, and time and easily aroused. GCS: GCS eye subscore is 4. GCS verbal subscore is 5. GCS motor subscore is 6. Comments: Mental Status:   Oriented to person, place, problem, and time.    Memory: Aware of recent and remote event. Good immediate recall.  Intact remote memory  Normal attention span and concentration. Language: intact naming, repeating and fluency   Good fund of Knowledge. Aware of current events and vocabulary   Cranial Nerves:   II: Visual fields: no visual field deficit.  Pupils: equal, round, reactive to light. III,IV,VI: Extra Ocular Movements are intact. No vertical nystagmus. I detect mild left beating horizontal nystagmus. Negative TS.   V: Facial sensation is intact to light touch  VII: Facial strength and movements: intact and symmetric  VIII: Hearing: Intact bilaterally  IX: Palate elevation is symmetric  XI: Shoulder shrug is intact  XII: Tongue movements are normal  Musculoskeletal: grossly  5/5 in all 4 extremities  Tone: Normal tone. Reflexes: Bilateral brachial radialis 2/4, knee 2/4 and ankle 2/4. Coordination: no pronator drift, normal finger to nose without pass pointing,  Normal DARVIN. No asterixis. Sensation: normal to light touch in both arms and legs. Gait/Posture: steady gait and normal posturing and station. No truncal ataxia. Psychiatric:         Behavior: Behavior normal. Behavior is cooperative.            DIAGNOSTIC RESULTS   LABS:    Labs Reviewed   COMPREHENSIVE METABOLIC PANEL W/ REFLEX TO MG FOR LOW K - Abnormal; Notable for the following components:       Result Value    Chloride 97 (*)     Glucose 135 (*)     BUN 27 (*)     GFR Non- 10/05/20 2310   BP:  128/71 (!) 140/68 (!) 148/69   Pulse: 69 75 67 74   Resp: 21      Temp:       TempSrc:       SpO2: 99% 97% 94%    Weight:       Height:           Patient was given thefollowing medications:  Medications   aspirin EC tablet 325 mg (325 mg Oral Given 10/5/20 2249)   iopamidol (ISOVUE-370) 76 % injection 85 mL (85 mLs Intravenous Given 10/5/20 2231)       PDMP Monitoring:    Last PDMP Yonatan as Reviewed [facility]):  Review User Review Instant Review Result            Urine Drug Screenings (1 yr)     No resulted procedures found. Medication Contract and Consent for Opioid Use Documents Filed      No documents found                MDM:   Patient seen and evaluated. Old records reviewed. Diagnostic testing reviewed and results discussed. Pt is a 64 yo female who presents for neck pain. On exam she is alert, oriented, febrile well-perfused and hemodynamically stable. She appears well-hydrated nontoxic. She is in no acute respiratory distress not requiring supplemental oxygen. She does note that she feels slightly dizzy that she feels like she is going to fall forward and this is exacerbated with forward flexion of the neck. She does have some mild soft tissue tenderness and swelling in the area of the parotid gland on the right side. No evidence of deep neck space infection or meningismus. Oropharynx is nonerythematous or edematous. Neuro exam is grossly normal however she does have mild nystagmus that is horizontal.  I made negative test of skew and her NIH SS is 0 I have low concern overall for posterior circulation stroke. She does have significant past medical history of left subclavian artery stenosis given this history will obtain labs and CTA of the neck with soft tissue contrast and reevaluate. Twelve-lead EKG was obtained, patient is not complaining of any chest pain or shortness of breath, my attending reviewed the EKG and did not detect any ST elevation.   Repeat EKG was done 15 minutes later, no evidence of acute ST elevation. Troponin was obtained it is negative. Patient continues to not have any chest pain. Labs reviewed, CBC unremarkable. CMP reveals mildly elevated BUN no other acute abnormality. I do not believe that this is contributory at all towards her neck complaints today. Will recommend oral rehydration. Troponin is negative. No lactic acidosis. I have low concern for septicemia. CTA of the head and neck with contrast reveals severe stenosis of the proximal left subclavian artery with diminished contrast in the hypoplastic left vertebral artery likely related to slow flow. Otherwise no other acute significant arterial stenosis in the head or neck. She has findings of mild acute uncomplicated right parotiditis. ER attending Dr. Lynda Odom signed out to Dr. Alecia Maradiaga, who is at bedside evaluating the patient. Patient is without current dizziness. Her neuro exam is relatively normal in that she does not have bidirectional nystagmus, NIHSS 0 and negative TS. I have low concern overall for acute central process. Additionally I have lower concern that she is in acute subclavian steel syndrome currently but I do have concern that this may be something she will develop therefore I will give her an urgent referral to vascular surgery. I do not believe she would warrant benefit from admission for this as this is something that can be achieved outpatient. She is already on asa, will advise to continue. In reguards to parotiditis, will advise to suck on hard sour candy and to monitor her symptoms. In abscence of fevers, redness, significant swelling, leukocyyotis I have low concern it is infectious at this time but pt advised on s/sx that would warrant return in the event she would worsen and require abx. Repeat trop pending at time of sign out. 2 other EKGs after the 1st are reassuring, no sign of acute ischemic process. She remains without CP.   I have low concern the dizziness she was experiencing is an ACS equivalent. 12:02 AM: I discussed the history, physical, and treatment plan with Dr. Manjinder Rowan. Kait Alicea was signed out in stable condition. Please see Dr. Philip Leyva note for further details, including diagnosis and disposition. FINAL IMPRESSION      1. Stenosis of left subclavian artery (HCC)    2. Parotiditis          DISPOSITION/PLAN   DISPOSITION        PATIENT REFERREDTO:  No follow-up provider specified.     DISCHARGE MEDICATIONS:  New Prescriptions    No medications on file       DISCONTINUED MEDICATIONS:  Discontinued Medications    No medications on file              (Please note that portions ofthis note were completed with a voice recognition program.  Efforts were made to edit the dictations but occasionally words are mis-transcribed.)    Pelon Phipps (electronically signed)        DAVIS Phipps  10/06/20 0013

## 2020-10-06 NOTE — ED PROVIDER NOTES
.I independently examined and evaluated Severo Basket. All diagnostic, treatment, and disposition decisions were made by myself in conjunction with the advanced practice provider. For all further details of the patient's emergency department visit, please see the advanced practice provider's documentation. Primary Care Physician: Holly Walden MD    History: This is a 63 y.o. female who presents to the Emergency Department with complaint of mild swelling, tenderness to palpation over angle of the jaw on right side. Symptom ongoing for last 1 day. Patient denies fevers chills cough congestion, coronavirus exposure. Denies change in smell or taste. Denies chest pain, shortness of breath at presentation. Patient freely moving neck in room. Does report prior history of subclavian artery stenosis left side. On my evaluation patient denying any neurologic deficits, denies vision change, numbness weakness in arms or legs. Does have mild frontal headache. No neck pain or stiffness. Patient denies purulent drainage in mouth. Denies sore throat, difficulty swallowing, voice change    Initial EKG report listing**acute MI**. Patient has no active chest pain, no chest pain equivalents. Review of patient's EKG does not show ST elevation or depressions meeting anterior/inferior/lateral or posterior  STEMI criteria. Unfortunate no prior EKGs for comparison and system. Repeat EKG was obtained 14 minutes after initial, no longer reading**acute MI**. Patient remains chest pain-free. Low clinical suspicion for ACS as patient has complained of left-sided facial pain overlying the right angle of mandible which is tender to palpation, no chest pain or chest pain equivalents without obvious ST changes on EKG consistent with STEMI    Physical:     height is 5' 2\" (1.575 m) and weight is 220 lb (99.8 kg). Her temporal temperature is 97.6 °F (36.4 °C).  Her blood pressure is 165/85 (abnormal) and her

## 2020-10-06 NOTE — ED PROVIDER NOTES
[place] of Care Note:    I assumed care of this patient at 23:00 from Dr. Yeny Loza, please see his note for more detail. Briefly, this pt is a 61-year-old female who presents due to mild swelling and tenderness over the right side of her jaw. Patient reports symptoms have been going on for the past day. Denies any fever. She denies any neck stiffness or inability to move the neck. She denies any infectious symptoms. According to report by the outgoing provider initial EKG was being read as acute MI even though the patient denied any chest pain and it was not interpreted as him as being concerning for an MI. Given this 2 further EKGs were obtained neither 1 of which were read as concerning for an MI and also did not appear to be an MI according to the provider. Therefore the patient is not thought to have a STEMI. Imaging is ordered including CTA head and neck. Plan to reevaluate the patient after imaging is complete. Imaging does show severe stenosis of the proximal left subclavian artery with diminished contrast concerning for predisposing the patient to subclavian steal syndrome. She denies any loss of consciousness or arm weakness or numbness. I have considered cerebellar stroke or central causes of dizziness and therefore have performed my independent exam of the patient. She has no facial droop, sensation is intact in all CN the distributions of the face. Normal speech and mental status. 5/5 strength in all 4 extremities. Normal gait. Normal sensation equal in all 4 extremities. Negative romberg. Normal finger to nose and heel to shin. An NIH stroke scale of 0. I do not suspect CVA based on this. Also this is not the reason for the patient's presentation. CT is concerning for mild uncomplicated parotitis. I do not suspect acute infection given lack of fever, normal white blood cell count, and lack of separative findings on CT.   I feel she is appropriate for NSAIDs and lemon candies to increase saliva secretion. She is referred to vascular surgery for outpatient follow-up of her proximal left subclavian artery stenosis. Very strict ER return precautions are given for any weakness, numbness, loss of conscious, or other concerns. The patient expresses understanding and agreement with this plan and is discharged home. FINAL IMPRESSION      1.  Stenosis of left subclavian artery (HCC)    2. Parotiditis             Heather Molina MD  10/06/20 [id number]

## 2020-10-09 ENCOUNTER — OFFICE VISIT (OUTPATIENT)
Dept: VASCULAR SURGERY | Age: 65
End: 2020-10-09
Payer: COMMERCIAL

## 2020-10-09 VITALS
BODY MASS INDEX: 39.16 KG/M2 | HEIGHT: 63 IN | SYSTOLIC BLOOD PRESSURE: 130 MMHG | DIASTOLIC BLOOD PRESSURE: 80 MMHG | WEIGHT: 221 LBS

## 2020-10-09 PROBLEM — K11.20 PAROTIDITIS: Status: ACTIVE | Noted: 2020-10-09

## 2020-10-09 PROCEDURE — 99204 OFFICE O/P NEW MOD 45 MIN: CPT | Performed by: SURGERY

## 2020-10-09 NOTE — PROGRESS NOTES
Outpatient Consultation / H&P    Date of Consultation:  10/9/2020    PCP:  Meghana Paniagua MD     Referring Provider:  JAKOB Martinez     Chief Complaint:   Chief Complaint   Patient presents with    Other     patient was ref by hospital for left subclavian artery stenosis. pamlr        History of Present Illness: We are asked to see this patient in consultation by JAKOB Martinez regarding left subclavian stenosis. Matthew Espinosa is a 63 y.o. female who was seen in ED with c/o R Neck/facial/jaw swelling. CT was performed consistent with right parotiditis and left subclavian stenosis was also noted. In March she had undergone arterial duplex of the left arm as she did have some complaints of numbness in hand. When questioned today she does report occasional numbness, but no arm fatigue, and no ischemic changes in fingers. Symptoms do not affect daily activities. She denies any lightheadedness or dizziness with arm usage. Patients jaw/neck pain continues but is improved. Past Medical History:  Past Medical History:   Diagnosis Date    HTN (hypertension)     Hyperlipidemia     No history of procedure 2/5/16    colonoscopy    Type II or unspecified type diabetes mellitus without mention of complication, not stated as uncontrolled        Past Surgical History:  Past Surgical History:   Procedure Laterality Date    COLONOSCOPY  02/05/2016    normal    TUBAL LIGATION         Home Medications:   Prior to Admission medications    Medication Sig Start Date End Date Taking?  Authorizing Provider   empagliflozin (JARDIANCE) 25 MG tablet Take 1 tablet by mouth daily 7/31/20  Yes David Scott MD   rosuvastatin (CRESTOR) 5 MG tablet TAKE ONE TABLET BY MOUTH ONCE NIGHTLY 5/15/20  Yes David Scott MD   glimepiride (AMARYL) 4 MG tablet TAKE ONE TABLET BY MOUTH TWICE A DAY  Patient taking differently: Take 2 mg by mouth 2 times daily  12/24/19  Yes JEANETTE Bill - CNP lisinopril (PRINIVIL;ZESTRIL) 5 MG tablet Take 1 tablet by mouth daily 19  Yes Chris Chase MD   vitamin D-3 (CHOLECALCIFEROL) 5000 units TABS Take 1 tablet by mouth daily 3/28/18  Yes Chris Chase MD   Omega-3 Fatty Acids (OMEGA 3 PO) Take by mouth   Yes Historical Provider, MD   fluticasone Jackquline Sees) 50 MCG/ACT nasal spray In each nostril daily 16  Yes Chris Chase MD   cetirizine [facility] ALLERGY) 10 MG tablet Take 1 tablet by mouth daily 16  Yes Chris Chase MD   calcium carbonate (OSCAL) 500 MG TABS tablet Take 2 tablets by mouth daily 10/27/15  Yes Chris Chase MD   aspirin 81 MG tablet Take 162 mg by mouth daily    Yes Historical Provider, MD        Allergies:  Codeine;  Fenofibrate; and Metformin and related      Social History:      Social History     Socioeconomic History    Marital status:      Spouse name: Not on file    Number of children: Not on file    Years of education: Not on file    Highest education level: Not on file   Occupational History    Not on file   Social Needs    Financial resource strain: Not on file    Food insecurity     Worry: Not on file     Inability: Not on file    Transportation needs     Medical: Not on file     Non-medical: Not on file   Tobacco Use    Smoking status: Former Smoker     Packs/day: 1.00     Years: 8.00     Pack years: 8.00     Types: Cigarettes     Last attempt to quit: 1983     Years since quittin.2    Smokeless tobacco: Never Used    Tobacco comment: pt has passive smoke exposure - spouse smokes in the house   Substance and Sexual Activity    Alcohol use: No     Alcohol/week: 0.0 standard drinks    Drug use: No    Sexual activity: Yes     Partners: Male   Lifestyle    Physical activity     Days per week: Not on file     Minutes per session: Not on file    Stress: Not on file   Relationships    Social connections     Talks on phone: Not on file Gets together: Not on file     Attends Sikh service: Not on file     Active member of club or organization: Not on file     Attends meetings of clubs or organizations: Not on file     Relationship status: Not on file    Intimate partner violence     Fear of current or ex partner: Not on file     Emotionally abused: Not on file     Physically abused: Not on file     Forced sexual activity: Not on file   Other Topics Concern    Not on file   Social History Narrative    Not on file       Family History:        Problem Relation Age of Onset    Diabetes Mother     Kidney Disease Mother     Heart Disease Mother     Heart Disease Father     High Blood Pressure Father        Review of Systems:  A 14 point review of systems was completed. Pertinent positives identified in the HPI, all other review of systems negative. Physical Examination:    /80 (Site: Left Upper Arm)   Ht 5' 2.5\" (1.588 m)   Wt 221 lb (100.2 kg)   BMI 39.78 kg/m²     Weight: 221 lb (100.2 kg)   R /80    General appearance: NAD  Eyes: PERRLA  Neck: no JVD, no lymphadenopathy. Respiratory: effort is unlabored, no crackles, wheezes or rubs. Cardiovascular: regular, no murmur. No carotid bruits. Pulses:    brachial radial   RIGHT 2 2   LEFT - -   GI: abdomen soft, nondistended, no organomegaly. Musculoskeletal: strength and tone normal.  Extremities: warm and pink. Left hand is warm and pink without ischemic changes. Skin: no dermatitis or ulceration. Neuro/psychiatric: grossly intact. MEDICAL DECISION MAKING/TESTING        CT:   Impression    1. Severe stenosis of the proximal left subclavian artery with diminished    contrast in the hypoplastic left vertebral artery likely related to slow    flow.  This predisposes the patient to subclavian steal syndrome. 2. Otherwise, no significant arterial stenosis in the head or neck. 3. Findings of mild acute uncomplicated right parotiditis.           Assessment: Diagnosis Orders   1. Subclavian artery stenosis, left (HCC)     2. Parotiditis       Subclavian stenosis is relatively asymptomatic. Parotiditis symptoms improving. Recommendations/Plan:    No arterial intervention at this time. Discussed with patient and family that if symptoms worsen angiogram with angioplasty and stenting can be performed. She should contact us for any increase in symptoms. If parotiditis symptoms continue ENT referral should be considered.         Jonathan Wiggins MD, FACS

## 2020-10-13 ENCOUNTER — OFFICE VISIT (OUTPATIENT)
Dept: FAMILY MEDICINE CLINIC | Age: 65
End: 2020-10-13
Payer: COMMERCIAL

## 2020-10-13 VITALS
DIASTOLIC BLOOD PRESSURE: 78 MMHG | OXYGEN SATURATION: 98 % | HEIGHT: 63 IN | HEART RATE: 82 BPM | WEIGHT: 228 LBS | TEMPERATURE: 97.8 F | BODY MASS INDEX: 40.4 KG/M2 | SYSTOLIC BLOOD PRESSURE: 144 MMHG

## 2020-10-13 PROBLEM — I65.01 STENOSIS OF RIGHT VERTEBRAL ARTERY: Status: ACTIVE | Noted: 2020-10-13

## 2020-10-13 PROCEDURE — 99214 OFFICE O/P EST MOD 30 MIN: CPT | Performed by: FAMILY MEDICINE

## 2020-10-13 PROCEDURE — 90471 IMMUNIZATION ADMIN: CPT | Performed by: FAMILY MEDICINE

## 2020-10-13 PROCEDURE — 90686 IIV4 VACC NO PRSV 0.5 ML IM: CPT | Performed by: FAMILY MEDICINE

## 2020-10-13 PROCEDURE — 1111F DSCHRG MED/CURRENT MED MERGE: CPT | Performed by: FAMILY MEDICINE

## 2020-10-13 NOTE — PATIENT INSTRUCTIONS
If you notice a heaviness, tenderness or cold sensation in your left arm, make sure you call Dr. Mitchell Mulligan office to let him know. You are scheduled for a lab appointment on Feb 10, 2021 at 10:00 AM.  Please do not eat or drink anything other than water and/or black coffee for 8 hours prior to the lab appointment. Patient should call the office immediately with new or ongoing signs or symptoms or worsening, or proceed to the emergency room. If you are on medications which could impair your senses, you are at risk of weakness, falls, dizziness, or drowsiness. You should be careful during activities which could place you at risk of harm, such as climbing, using stairs, operating machinery, or driving vehicles. If you feel you cannot safely do these activities, you should request others to help you, or avoid the activities altogether. If you are drowsy for any other reason, you should use the same precautions as listed above.

## 2020-10-13 NOTE — PROGRESS NOTES
Chief Complaint   Patient presents with    Follow-Up from Hospital     Stenosis of left subclavian artery and parotiditis     Dizziness    Headache       HPI:  Sam Street is a 63 y.o. (: 1955) here today for hospital follow up regarding stenosis of her left subclavian artery. Pt consulted with vascular surgeon Dr. Myron Zamudio last week. While she was in the hospital, it was also thought that she had right sided parotiditis. Dr. Ping Montesinos told pt no intervention needed at this time but if symptoms worsen, will consider angiogram with angioplasty and stenting. For the parotiditis she is still using NSAIDS and sour candies. She's not nearly as dizzy/lightheaded as before and her headaches aren't as bad as they were before. She does have dizziness with position changes and has some nausea as well. Pt reports her headache across her forehead, it never goes away, but compared to what it was before, it's better. Patient's medications, allergies, past medical, surgical, social and family histories were reviewed and updated asappropriate on 10/13/2020 at 10:08 AM.    ROS:  Review of Systems    All other systems reviewed and are negative except as noted above on 10/13/2020 at 10:08 AM. Additional review of systems may be scanned into the media section ofthis medical record. Any responses requiring further intervention were pursued.     Hemoglobin A1C (%)   Date Value   2020 7.0     Microalbumin, Random Urine (mg/dL)   Date Value   2020 <1.20     LDL Calculated (mg/dL)   Date Value   2020 see below     Past Medical History:   Diagnosis Date    HTN (hypertension)     Hyperlipidemia     No history of procedure 16    colonoscopy    Type II or unspecified type diabetes mellitus without mention of complication, not stated as uncontrolled      Family History   Problem Relation Age of Onset    Diabetes Mother     Kidney Disease Mother     Heart Disease Mother     Heart Disease Father     High Blood Pressure Father      Social History     Socioeconomic History    Marital status:      Spouse name: Not on file    Number of children: Not on file    Years of education: Not on file    Highest education level: Not on file   Occupational History    Not on file   Social Needs    Financial resource strain: Not on file    Food insecurity     Worry: Not on file     Inability: Not on file    Transportation needs     Medical: Not on file     Non-medical: Not on file   Tobacco Use    Smoking status: Former Smoker     Packs/day: 1.00     Years: 8.00     Pack years: 8.00     Types: Cigarettes     Last attempt to quit: 1983     Years since quittin.2    Smokeless tobacco: Never Used    Tobacco comment: pt has passive smoke exposure - spouse smokes in the house   Substance and Sexual Activity    Alcohol use: No     Alcohol/week: 0.0 standard drinks    Drug use: No    Sexual activity: Yes     Partners: Male   Lifestyle    Physical activity     Days per week: Not on file     Minutes per session: Not on file    Stress: Not on file   Relationships    Social connections     Talks on phone: Not on file     Gets together: Not on file     Attends Judaism service: Not on file     Active member of club or organization: Not on file     Attends meetings of clubs or organizations: Not on file     Relationship status: Not on file    Intimate partner violence     Fear of current or ex partner: Not on file     Emotionally abused: Not on file     Physically abused: Not on file     Forced sexual activity: Not on file   Other Topics Concern    Not on file   Social History Narrative    Not on file       Prior to Visit Medications    Medication Sig Taking?  Authorizing Provider   empagliflozin (JARDIANCE) 25 MG tablet Take 1 tablet by mouth daily Yes Teresa Cisneros MD   rosuvastatin (CRESTOR) 5 MG tablet TAKE ONE TABLET BY MOUTH ONCE NIGHTLY Yes Teresa Cisneros MD glimepiride (AMARYL) 4 MG tablet TAKE ONE TABLET BY MOUTH TWICE A DAY  Patient taking differently: Take 2 mg by mouth 2 times daily  Yes JEANETTE Bill CNP   lisinopril (PRINIVIL;ZESTRIL) 5 MG tablet Take 1 tablet by mouth daily Yes David Scott MD   vitamin D-3 (CHOLECALCIFEROL) 5000 units TABS Take 1 tablet by mouth daily  Patient taking differently: Take 5,000 Units by mouth daily 2-3 times a week Yes David Scott MD   Omega-3 Fatty Acids (OMEGA 3 PO) Take by mouth Yes Historical Provider, MD   fluticasone (FLONASE) 50 MCG/ACT nasal spray In each nostril daily Yes David Scott MD   cetirizine (ZYRTEC ALLERGY) 10 MG tablet Take 1 tablet by mouth daily Yes David Scott MD   calcium carbonate (OSCAL) 500 MG TABS tablet Take 2 tablets by mouth daily Yes David Scott MD   aspirin 81 MG tablet Take 162 mg by mouth daily  Yes Historical Provider, MD     Allergies   Allergen Reactions    Codeine     Fenofibrate      Skin turns red and get hives    Metformin And Related      She states it caused joint pain. She tried it 3 times and had same reaction. OBJECTIVE:  Estimated body mass index is 41.04 kg/m² as calculated from the following:    Height as of this encounter: 5' 2.5\" (1.588 m). Weight as of this encounter: 228 lb (103.4 kg). Vitals:    10/13/20 0950   Pulse: 82   Temp: 97.8 °F (36.6 °C)   TempSrc: Temporal   SpO2: 98%   Weight: 228 lb (103.4 kg)   Height: 5' 2.5\" (1.588 m)     BP Readings from Last 2 Encounters:   10/09/20 130/80   10/06/20 139/76     Wt Readings from Last 3 Encounters:   10/13/20 228 lb (103.4 kg)   10/09/20 221 lb (100.2 kg)   10/05/20 220 lb (99.8 kg)       Physical Exam  Vitals signs and nursing note reviewed. Constitutional:       General: She is not in acute distress. Appearance: She is well-developed. She is not diaphoretic. HENT:      Head: Normocephalic and atraumatic.       Jaw: No scribing for and in the presence of Dariel Hollins MD. Electronically signed by Martha Stokes MA on 10/13/2020 at 10:08 AM      Provider attestation:     I, Dr. Sarah Leyva, personally performed the services described in this documentation, as scribed by the above signed scribe in my presence, and it is both accurate and complete. I agree with the ROS and Past Histories independently gathered by the clinical support staff and the remaining scribed note accurately describes my personal service to the patient.       10/13/2020    10:52 AM

## 2020-10-15 ENCOUNTER — TELEPHONE (OUTPATIENT)
Dept: ENT CLINIC | Age: 65
End: 2020-10-15

## 2020-10-15 ENCOUNTER — TELEPHONE (OUTPATIENT)
Dept: FAMILY MEDICINE CLINIC | Age: 65
End: 2020-10-15

## 2020-10-15 ENCOUNTER — OFFICE VISIT (OUTPATIENT)
Dept: ENT CLINIC | Age: 65
End: 2020-10-15
Payer: COMMERCIAL

## 2020-10-15 VITALS
TEMPERATURE: 97.3 F | WEIGHT: 225.2 LBS | HEIGHT: 63 IN | HEART RATE: 88 BPM | DIASTOLIC BLOOD PRESSURE: 81 MMHG | SYSTOLIC BLOOD PRESSURE: 187 MMHG | BODY MASS INDEX: 39.9 KG/M2

## 2020-10-15 PROCEDURE — 99203 OFFICE O/P NEW LOW 30 MIN: CPT | Performed by: OTOLARYNGOLOGY

## 2020-10-15 NOTE — TELEPHONE ENCOUNTER
Spoke w/ patient. Explained she needs to call her ENT for antibiotics if wanted her to take them. There are no changes in her BP medication/treatment. Pt needs to continue to monitor her numbers and let us know if it changes or continues to go up.

## 2020-10-15 NOTE — PROGRESS NOTES
[address] [facility] SURGERY  NEW PATIENT HISTORY AND PHYSICAL NOTE      Patient Name: [address] [place] [address] Dr Art Record Number:  [id number]  Primary Care Physician:  Rowena Godoy MD    ChiefComplaint     Chief Complaint   Patient presents with    Dizziness     States she feels like she is going to fall sometimes when she is walking, states she has to walk slow to make sure she does not fall. History of Present Illness     Sharon Boudreaux is an 63 y.o. female with complaint of dizziness/lightheadedness - has left subclavian and vertebral artery stenosis - referred to ENT for parotitis while inpateint last week. Seen in the ED 10/5/2020 with right sided neck swelling, pain - diagnosed with acute parotitis however admitted for vascular issues. At baseline she has history of diabetes with paresthesias in the bilateral feet. In addition, she has intermittent tingling in the left arm. States dizziness/lightheadedness that has been ongoing since 10/5/2020 along with headache and \"sense of head fullness\" - denies leo vertigo/room spinning or sense of translational motion, however believes that if she stresses balance systems she will \"fall over\". No oscillopsia or objects in field-of-view moving back and forth. She has had no prior history of head trauma or middle ear infection. In addition, she states hearing loss in the right ear however no aural fullness, otorrhea or otalgia.     Past Medical History     Past Medical History:   Diagnosis Date    Allergic rhinitis     Hearing loss     HTN (hypertension)     Hyperlipidemia     No history of procedure 2/5/16    colonoscopy    Type II or unspecified type diabetes mellitus without mention of complication, not stated as uncontrolled        Past Surgical History     Past Surgical History:   Procedure Laterality Date    COLONOSCOPY  02/05/2016    normal    TUBAL LIGATION         Family History     Family History Review of Systems     REVIEW OF SYSTEMS  The following systems were reviewed and revealed the following in addition to any already discussed in the HPI:    CONSTITUTIONAL: No weight loss, no fever, + night sweats, no chills  EYES: no vision changes, no blurry vision  EARS: Left side changes in hearing, no otalgia  NOSE: no epistaxis, no rhinorrhea  RESPIRATORY: No difficulty breathing, no shortness of breath  CV: no chest pain, + peripheral vascular disease  HEME: No coagulation disorder, no bleeding disorder  NEURO: No TIA or stroke-like symptoms  SKIN: No new rashes in the head and neck, no recent skin cancers  MOUTH: No new ulcers, no recent teeth infections  GASTROINTESTINAL: No diarrhea, stomach pain  PSYCH: No anxiety, no depression    PhysicalExam     Vitals:    10/15/20 1052 10/15/20 1100   BP: (!) 191/96 (!) 187/81   Site: Right Upper Arm Right Upper Arm   Position: Sitting Sitting   Cuff Size: Medium Adult Medium Adult   Pulse: 96 88   Temp: 97.3 °F (36.3 °C)    TempSrc: Infrared    Weight: 225 lb 3.2 oz (102.2 kg)    Height: 5' 2.5\" (1.588 m)        PHYSICAL EXAM  BP (!) 187/81 (Site: Right Upper Arm, Position: Sitting, Cuff Size: Medium Adult)   Pulse 88   Temp 97.3 °F (36.3 °C) (Infrared)   Ht 5' 2.5\" (1.588 m)   Wt 225 lb 3.2 oz (102.2 kg)   BMI 40.53 kg/m²     GENERAL: No Acute Distress, Alert and Oriented, no hoarseness  EYES: EOMI, Anti-icteric  NOSE: On anterior rhinoscopy there is no epistaxis, nasal mucosa within normal limits, no purulent drainage  EARS: Normal external appearance; on portable otomicroscopy:  -Right ear: External auditory canal without stenosis, tympanic membrane clear, no middle ear effusions or retractions  -Left ear: External auditory canal without stenosis, tympanic membrane clear, no middle ear effusions or retractions  -Tuning fork testing: With a 512-Hz tuning fork the Cooper is midline, The Rinne on the right ear the is air>bone conduction, on the left ear air>bone conduction  FACE: 1/6 House-Brackmann Scale, symmetric, sensation equal bilaterally. No overt fullness of the right parotid gland however moderate tenderness to palpation. ORAL CAVITY: No masses or lesions palpated, uvula is midline, moist mucous membranes, 0+ tonsils, absent maxillary dentition (has plate). Good egress of saliva from the bilateral parotid and submandibular ducts on bimanual palpation. No purulence expressed. NECK: Normal range of motion, no thyromegaly, trachea is midline, no lymphadenopathy, no neck masses, no crepitus  CHEST: Normal respiratory effort, no retractions, breathing comfortably  SKIN: No rashes, normal appearing skin, no evidence of skin lesions/tumors  NEURO: CN 2, 3, 4, 5, 6, 7, 11, 12 intact bilaterally   -Delfino-Hallpike testing (posterior semicircular canal testing): Not tested  -Supine head roll (lateral semicircular canal testing): Not tested  -Head impulse testing (VOR function): No corrective saccade, however patient states significant disequilibrium with head shake (no nystagmus is identified)  -Nystagmus testing (primary, central, left gaze): No gaze evoked nystagmus  -Skew deviation/vertical dysconjugate gaze: Test of skew  -Finger-nose testing: Not tested  -Romberg testing: No sway/falls  -Tandem walk: Able to complete tandem walk without falling  -Fukuda step test: Not tested    Data/Imaging Review       Procedure     None      Assessment and Plan     1. Hearing loss, unspecified hearing loss type, unspecified laterality  Patient with hearing loss since 10/5/2020 along with headache and sensation of head fullness. We will arrange for audiometric testing as on examination we do not appreciate any pathology of the external auditory canals, tympanic membranes or visualize middle ear. - Mir Munoz, Audiology, [facility]    2. Parotitis  Patient with recent episode of parotitis 10/5/2020, has been significantly improved during hospital stay. Today she has no purulence from Stensen's duct and the parotid gland does not appear asymmetric in size, although there is moderate tenderness to palpation of the right parotid gland. No facial nerve weakness is identified.  -Vascular to continue conservative management with warm compresses, sialagogues, significant intake of water  -If symptoms worsen, she is to notify ENT clinic    3. Dizziness  Patient with subjective dizziness/disequilibrium-no sensation of vertigo or translational motion, however believes that turning her head quickly exacerbates feeling of unsteadiness. We do not appreciate any leo nystagmus on head shaking, head thrust testing, or gaze, however she states significant disequilibrium during head shaking. He is able to complete the Romberg test and tandem walk testing without falls. She has history of diabetes with bilateral lower limb paresthesias, and on examination today significant hypertension with systolic blood pressure in the 180s. 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 We will discuss hypertension management with her primary care provider-if this does not improve her disequilibrium, she is to call the clinic back and we will arrange for further balance testing. Return if symptoms worsen or fail to improve. Mery Crowe MD  [facility]  Department of Otolaryngology/Head and Neck Surgery  10/15/20    I have performed a head and neck physical exam personally or was physically present during the key or critical portions of the service. Medical Decision Making:   The following items were considered in medical decision making:  Independent review of images  Review / order clinical lab tests  Review / order radiology tests  Decision to obtain old records  Review and summation of old records as accessed through [facility] (a summary of my findings in these old records: None) Portions of this note were dictated using Dragon.  There may be linguistic errors secondary to the use of this program.

## 2020-10-15 NOTE — TELEPHONE ENCOUNTER
Patient called with a question for Dr. Pepe Mayer. Patient states that she read on her After Visit Summary that she is to \"continue antibiotics\". Writer verified that this is indeed noted on her AVS. Patient is not currently taking any antibiotics, and none were prescribed to her today. Patient also denies that she has taken ATBs recently. Patient states that she is very confused and does not understand why Dr. Pepe Mayer \"put that on there\".      Please call patient back (or advise so staff may call patient back)    [phone]

## 2020-10-15 NOTE — PATIENT INSTRUCTIONS
-Drink 1-1.5L of water daily  -Massage the right face/parotid gland (from back to front) to encourage salivary flow  -Use sugar-free sour candy or foods to stimulate salivary flow  -Use warm compresses to the right face/parotid gland to decrease inflammation   -Continue antibiotics as prescribed  -Tylenol/NSAIDs for pain

## 2020-10-15 NOTE — TELEPHONE ENCOUNTER
----- Message from [facility] [place] - INPATIENT sent at 10/15/2020  1:56 PM EDT -----  Subject: Message to Provider    QUESTIONS  Information for Provider? ENT doctor recommends \"continue \" taking   antibiotics . Patient states none were prescribes . Requesting antibiotics   please follow up to advise Also /91. and 187/81   ---------------------------------------------------------------------------  --------------  CALL BACK INFO  What is the best way for the office to contact you? OK to leave message on   voicemail  Preferred Call Back Phone Number? [phone]  ---------------------------------------------------------------------------  --------------  SCRIPT ANSWERS  Relationship to Patient?  Self

## 2020-10-15 NOTE — Clinical Note
Dr. Yanira Huber,  Thank you for referring this patient to me after her inpatient visit. Her parotitis is improving, however she states significant disequilibrium and hearing loss in the right ear. We will have her see our audiologist for formal audiogram to determine any objective hearing loss. From a balance standpoint, I cannot find a vestibular cause, however she has significant disequilibrium when she takes rapid turns or shakes her head (we do not appreciate nystagmus with this). She had significantly elevated blood pressure in the clinic (191/96) -her symptoms may be due to hypertension; if we are able to normalize her blood pressure and her disequilibrium does not improve, please refer her back to me and we will perform formal balance testing.     Fernando Amado

## 2020-10-15 NOTE — TELEPHONE ENCOUNTER
I have read his note and he does not recommend antibiotics in the note, just continue simple treatment that was initiated in the emergency room

## 2020-10-16 ENCOUNTER — TELEPHONE (OUTPATIENT)
Dept: FAMILY MEDICINE CLINIC | Age: 65
End: 2020-10-16

## 2020-10-16 NOTE — TELEPHONE ENCOUNTER
Patient called in to the office again this morning with request to speak to Dr. Kevan Paredes about her discharge summary stating \"continue antibiotics\". Patient is very distressed and does not understand why her paperwork says this if the doctor did not prescribe her any antibiotics. She has called her PCP and asked her PCP about this as well.      Please call patient back at [phone]

## 2020-10-16 NOTE — TELEPHONE ENCOUNTER
Pt had apt with ENT as referred by Dr. Chris Conti. She wants to know how to proceed from here. Does she need another appointment with PCP to discuss what to do about the artery in her neck or is additional testing warranted? Pt advised that Dr. Chris Conti is out of the office today and will review Dr. Darshan Najera note when he returns on Monday and advise her from there.

## 2020-10-16 NOTE — TELEPHONE ENCOUNTER
Reach patient at home, notified her that the \"continue antibiotics \"sentence in her AVS was made in error. She affirmed understanding.

## 2020-10-20 ENCOUNTER — HOSPITAL ENCOUNTER (INPATIENT)
Age: 65
LOS: 1 days | Discharge: HOME | DRG: 038 | End: 2020-10-23
Attending: INTERNAL MEDICINE | Admitting: INTERNAL MEDICINE
Payer: COMMERCIAL

## 2020-10-20 ENCOUNTER — APPOINTMENT (OUTPATIENT)
Dept: CT IMAGING | Age: 65
End: 2020-10-20
Payer: COMMERCIAL

## 2020-10-20 ENCOUNTER — HOSPITAL ENCOUNTER (EMERGENCY)
Age: 65
Discharge: ANOTHER HOSPITAL | End: 2020-10-20
Attending: EMERGENCY MEDICINE
Payer: COMMERCIAL

## 2020-10-20 ENCOUNTER — TELEPHONE (OUTPATIENT)
Dept: FAMILY MEDICINE CLINIC | Age: 65
End: 2020-10-20

## 2020-10-20 VITALS
OXYGEN SATURATION: 94 % | RESPIRATION RATE: 18 BRPM | SYSTOLIC BLOOD PRESSURE: 162 MMHG | HEART RATE: 77 BPM | BODY MASS INDEX: 40.5 KG/M2 | WEIGHT: 225 LBS | TEMPERATURE: 97.9 F | DIASTOLIC BLOOD PRESSURE: 73 MMHG

## 2020-10-20 PROBLEM — R42 DIZZINESS: Status: ACTIVE | Noted: 2020-10-20

## 2020-10-20 LAB
A/G RATIO: 1.5 (ref 1.1–2.2)
ALBUMIN SERPL-MCNC: 4.7 G/DL (ref 3.4–5)
ALP BLD-CCNC: 72 U/L (ref 40–129)
ALT SERPL-CCNC: 20 U/L (ref 10–40)
ANION GAP SERPL CALCULATED.3IONS-SCNC: 12 MMOL/L (ref 3–16)
APTT: 33 SEC (ref 24.2–36.2)
AST SERPL-CCNC: 21 U/L (ref 15–37)
BACTERIA: ABNORMAL /HPF
BASOPHILS ABSOLUTE: 0.1 K/UL (ref 0–0.2)
BASOPHILS RELATIVE PERCENT: 1.2 %
BILIRUB SERPL-MCNC: 0.3 MG/DL (ref 0–1)
BILIRUBIN URINE: NEGATIVE
BLOOD, URINE: NEGATIVE
BUN BLDV-MCNC: 23 MG/DL (ref 7–20)
CALCIUM SERPL-MCNC: 9.8 MG/DL (ref 8.3–10.6)
CHLORIDE BLD-SCNC: 100 MMOL/L (ref 99–110)
CLARITY: CLEAR
CO2: 26 MMOL/L (ref 21–32)
COLOR: YELLOW
CREAT SERPL-MCNC: 0.8 MG/DL (ref 0.6–1.2)
EKG ATRIAL RATE: 81 BPM
EKG DIAGNOSIS: NORMAL
EKG P AXIS: 61 DEGREES
EKG P-R INTERVAL: 164 MS
EKG Q-T INTERVAL: 406 MS
EKG QRS DURATION: 100 MS
EKG QTC CALCULATION (BAZETT): 471 MS
EKG R AXIS: 25 DEGREES
EKG T AXIS: 37 DEGREES
EKG VENTRICULAR RATE: 81 BPM
EOSINOPHILS ABSOLUTE: 0.1 K/UL (ref 0–0.6)
EOSINOPHILS RELATIVE PERCENT: 1.8 %
EPITHELIAL CELLS, UA: ABNORMAL /HPF (ref 0–5)
GFR AFRICAN AMERICAN: >60
GFR NON-AFRICAN AMERICAN: >60
GLOBULIN: 3.2 G/DL
GLUCOSE BLD-MCNC: 104 MG/DL (ref 70–99)
GLUCOSE BLD-MCNC: 133 MG/DL (ref 70–99)
GLUCOSE URINE: >=1000 MG/DL
HCT VFR BLD CALC: 44.3 % (ref 36–48)
HEMOGLOBIN: 14.7 G/DL (ref 12–16)
INR BLD: 0.92 (ref 0.86–1.14)
KETONES, URINE: NEGATIVE MG/DL
LEUKOCYTE ESTERASE, URINE: ABNORMAL
LYMPHOCYTES ABSOLUTE: 1.8 K/UL (ref 1–5.1)
LYMPHOCYTES RELATIVE PERCENT: 28 %
MCH RBC QN AUTO: 28 PG (ref 26–34)
MCHC RBC AUTO-ENTMCNC: 33.1 G/DL (ref 31–36)
MCV RBC AUTO: 84.6 FL (ref 80–100)
MICROSCOPIC EXAMINATION: YES
MONOCYTES ABSOLUTE: 0.4 K/UL (ref 0–1.3)
MONOCYTES RELATIVE PERCENT: 6 %
NEUTROPHILS ABSOLUTE: 4 K/UL (ref 1.7–7.7)
NEUTROPHILS RELATIVE PERCENT: 63 %
NITRITE, URINE: NEGATIVE
PDW BLD-RTO: 14.5 % (ref 12.4–15.4)
PERFORMED ON: ABNORMAL
PH UA: 6 (ref 5–8)
PLATELET # BLD: 222 K/UL (ref 135–450)
PMV BLD AUTO: 8.5 FL (ref 5–10.5)
POTASSIUM REFLEX MAGNESIUM: 4.5 MMOL/L (ref 3.5–5.1)
PROTEIN UA: NEGATIVE MG/DL
PROTHROMBIN TIME: 10.7 SEC (ref 10–13.2)
RBC # BLD: 5.24 M/UL (ref 4–5.2)
RBC UA: ABNORMAL /HPF (ref 0–4)
SODIUM BLD-SCNC: 138 MMOL/L (ref 136–145)
SPECIFIC GRAVITY UA: <=1.005 (ref 1–1.03)
TOTAL PROTEIN: 7.9 G/DL (ref 6.4–8.2)
TROPONIN: <0.01 NG/ML
URINE TYPE: ABNORMAL
UROBILINOGEN, URINE: 0.2 E.U./DL
WBC # BLD: 6.3 K/UL (ref 4–11)
WBC UA: ABNORMAL /HPF (ref 0–5)

## 2020-10-20 PROCEDURE — 6370000000 HC RX 637 (ALT 250 FOR IP): Performed by: EMERGENCY MEDICINE

## 2020-10-20 PROCEDURE — 84484 ASSAY OF TROPONIN QUANT: CPT

## 2020-10-20 PROCEDURE — 93010 ELECTROCARDIOGRAM REPORT: CPT | Performed by: INTERNAL MEDICINE

## 2020-10-20 PROCEDURE — 93005 ELECTROCARDIOGRAM TRACING: CPT | Performed by: EMERGENCY MEDICINE

## 2020-10-20 PROCEDURE — 2580000003 HC RX 258: Performed by: NURSE PRACTITIONER

## 2020-10-20 PROCEDURE — G0379 DIRECT REFER HOSPITAL OBSERV: HCPCS

## 2020-10-20 PROCEDURE — 81001 URINALYSIS AUTO W/SCOPE: CPT

## 2020-10-20 PROCEDURE — 36415 COLL VENOUS BLD VENIPUNCTURE: CPT

## 2020-10-20 PROCEDURE — 85610 PROTHROMBIN TIME: CPT

## 2020-10-20 PROCEDURE — 99284 EMERGENCY DEPT VISIT MOD MDM: CPT

## 2020-10-20 PROCEDURE — 85025 COMPLETE CBC W/AUTO DIFF WBC: CPT

## 2020-10-20 PROCEDURE — 70450 CT HEAD/BRAIN W/O DYE: CPT

## 2020-10-20 PROCEDURE — 96372 THER/PROPH/DIAG INJ SC/IM: CPT

## 2020-10-20 PROCEDURE — 6370000000 HC RX 637 (ALT 250 FOR IP): Performed by: NURSE PRACTITIONER

## 2020-10-20 PROCEDURE — G0378 HOSPITAL OBSERVATION PER HR: HCPCS

## 2020-10-20 PROCEDURE — 85730 THROMBOPLASTIN TIME PARTIAL: CPT

## 2020-10-20 PROCEDURE — 6360000002 HC RX W HCPCS: Performed by: NURSE PRACTITIONER

## 2020-10-20 PROCEDURE — 80053 COMPREHEN METABOLIC PANEL: CPT

## 2020-10-20 RX ORDER — LISINOPRIL 5 MG/1
5 TABLET ORAL DAILY
Status: DISCONTINUED | OUTPATIENT
Start: 2020-10-20 | End: 2020-10-23 | Stop reason: HOSPADM

## 2020-10-20 RX ORDER — SODIUM CHLORIDE 0.9 % (FLUSH) 0.9 %
10 SYRINGE (ML) INJECTION EVERY 12 HOURS SCHEDULED
Status: DISCONTINUED | OUTPATIENT
Start: 2020-10-20 | End: 2020-10-23 | Stop reason: HOSPADM

## 2020-10-20 RX ORDER — SODIUM CHLORIDE 9 MG/ML
INJECTION, SOLUTION INTRAVENOUS CONTINUOUS
Status: DISCONTINUED | OUTPATIENT
Start: 2020-10-20 | End: 2020-10-21

## 2020-10-20 RX ORDER — ACETAMINOPHEN 325 MG/1
650 TABLET ORAL EVERY 6 HOURS PRN
Status: DISCONTINUED | OUTPATIENT
Start: 2020-10-20 | End: 2020-10-23 | Stop reason: HOSPADM

## 2020-10-20 RX ORDER — BUTALBITAL, ACETAMINOPHEN AND CAFFEINE 50; 325; 40 MG/1; MG/1; MG/1
1 TABLET ORAL ONCE
Status: COMPLETED | OUTPATIENT
Start: 2020-10-20 | End: 2020-10-20

## 2020-10-20 RX ORDER — ONDANSETRON 2 MG/ML
4 INJECTION INTRAMUSCULAR; INTRAVENOUS EVERY 6 HOURS PRN
Status: DISCONTINUED | OUTPATIENT
Start: 2020-10-20 | End: 2020-10-23 | Stop reason: HOSPADM

## 2020-10-20 RX ORDER — SODIUM CHLORIDE 0.9 % (FLUSH) 0.9 %
10 SYRINGE (ML) INJECTION PRN
Status: DISCONTINUED | OUTPATIENT
Start: 2020-10-20 | End: 2020-10-23 | Stop reason: HOSPADM

## 2020-10-20 RX ORDER — ROSUVASTATIN CALCIUM 10 MG/1
5 TABLET, COATED ORAL NIGHTLY
Status: DISCONTINUED | OUTPATIENT
Start: 2020-10-20 | End: 2020-10-23 | Stop reason: HOSPADM

## 2020-10-20 RX ORDER — MECLIZINE HYDROCHLORIDE 25 MG/1
25 TABLET ORAL ONCE
Status: COMPLETED | OUTPATIENT
Start: 2020-10-20 | End: 2020-10-20

## 2020-10-20 RX ORDER — FLUTICASONE PROPIONATE 50 MCG
1 SPRAY, SUSPENSION (ML) NASAL DAILY
Status: DISCONTINUED | OUTPATIENT
Start: 2020-10-20 | End: 2020-10-23 | Stop reason: HOSPADM

## 2020-10-20 RX ORDER — CEPHALEXIN 500 MG/1
500 CAPSULE ORAL ONCE
Status: COMPLETED | OUTPATIENT
Start: 2020-10-20 | End: 2020-10-20

## 2020-10-20 RX ORDER — PROMETHAZINE HYDROCHLORIDE 25 MG/1
12.5 TABLET ORAL EVERY 6 HOURS PRN
Status: DISCONTINUED | OUTPATIENT
Start: 2020-10-20 | End: 2020-10-23 | Stop reason: HOSPADM

## 2020-10-20 RX ORDER — DEXTROSE MONOHYDRATE 25 G/50ML
12.5 INJECTION, SOLUTION INTRAVENOUS PRN
Status: DISCONTINUED | OUTPATIENT
Start: 2020-10-20 | End: 2020-10-23 | Stop reason: HOSPADM

## 2020-10-20 RX ORDER — ACETAMINOPHEN 650 MG/1
650 SUPPOSITORY RECTAL EVERY 6 HOURS PRN
Status: DISCONTINUED | OUTPATIENT
Start: 2020-10-20 | End: 2020-10-23 | Stop reason: HOSPADM

## 2020-10-20 RX ORDER — CETIRIZINE HYDROCHLORIDE 10 MG/1
10 TABLET ORAL DAILY
Status: DISCONTINUED | OUTPATIENT
Start: 2020-10-20 | End: 2020-10-23 | Stop reason: HOSPADM

## 2020-10-20 RX ORDER — ASPIRIN 325 MG
162 TABLET ORAL DAILY
Status: DISCONTINUED | OUTPATIENT
Start: 2020-10-20 | End: 2020-10-22

## 2020-10-20 RX ORDER — NICOTINE POLACRILEX 4 MG
15 LOZENGE BUCCAL PRN
Status: DISCONTINUED | OUTPATIENT
Start: 2020-10-20 | End: 2020-10-23 | Stop reason: HOSPADM

## 2020-10-20 RX ORDER — DEXTROSE MONOHYDRATE 50 MG/ML
100 INJECTION, SOLUTION INTRAVENOUS PRN
Status: DISCONTINUED | OUTPATIENT
Start: 2020-10-20 | End: 2020-10-23 | Stop reason: HOSPADM

## 2020-10-20 RX ADMIN — MECLIZINE HYDROCHLORIDE 25 MG: 25 TABLET ORAL at 12:54

## 2020-10-20 RX ADMIN — LISINOPRIL 5 MG: 5 TABLET ORAL at 21:21

## 2020-10-20 RX ADMIN — Medication 10 ML: at 21:22

## 2020-10-20 RX ADMIN — SODIUM CHLORIDE: 9 INJECTION, SOLUTION INTRAVENOUS at 21:33

## 2020-10-20 RX ADMIN — ENOXAPARIN SODIUM 40 MG: 40 INJECTION SUBCUTANEOUS at 21:21

## 2020-10-20 RX ADMIN — ROSUVASTATIN CALCIUM 5 MG: 10 TABLET, FILM COATED ORAL at 21:21

## 2020-10-20 RX ADMIN — CEPHALEXIN 500 MG: 500 CAPSULE ORAL at 14:04

## 2020-10-20 RX ADMIN — ASPIRIN 325 MG ORAL TABLET 162 MG: 325 PILL ORAL at 21:21

## 2020-10-20 RX ADMIN — BUTALBITAL, ACETAMINOPHEN, AND CAFFEINE 1 TABLET: 50; 325; 40 TABLET ORAL at 12:55

## 2020-10-20 RX ADMIN — ACETAMINOPHEN 650 MG: 325 TABLET ORAL at 20:55

## 2020-10-20 RX ADMIN — FLUTICASONE PROPIONATE 1 SPRAY: 50 SPRAY, METERED NASAL at 21:22

## 2020-10-20 ASSESSMENT — PAIN SCALES - GENERAL
PAINLEVEL_OUTOF10: 7
PAINLEVEL_OUTOF10: 5

## 2020-10-20 ASSESSMENT — PAIN DESCRIPTION - DESCRIPTORS: DESCRIPTORS: HEADACHE

## 2020-10-20 NOTE — TELEPHONE ENCOUNTER
With the pain and dizziness both significantly worse I recommend return to the emergency room she may need additional imaging of the brain

## 2020-10-20 NOTE — TELEPHONE ENCOUNTER
Pt said that her head was really hurting and the dizziness is more than last week. Michelle Dow that she uses [facility].

## 2020-10-20 NOTE — TELEPHONE ENCOUNTER
Pt called, informed of recommendations, and voiced understanding. Pt is going to call her sister to see if she can take her to ER.

## 2020-10-20 NOTE — ED PROVIDER NOTES
[address] ED  EMERGENCY DEPARTMENT ENCOUNTER      Pt Name: Jaime Carvalho  MRN: [id number]  [place] 1955  Date of evaluation: 10/20/2020  Provider: Richard Garduno MD    CHIEF COMPLAINT       Chief Complaint   Patient presents with    Dizziness     feels dizzy and has a headache. was here two weeks ago with the dizziness and headache. the headache has never gone away. states she can not function with this dizziness.  Headache         HISTORY OF PRESENT ILLNESS   (Location/Symptom, Timing/Onset, Context/Setting, Quality, Duration, Modifying Factors, Severity)  Note limiting factors. Jaime Carvalho is a 63 y.o. female with past medical history of hypertension, hyperlipidemia, diabetes and left subclavian artery stenosis here today with dizziness headache    Patient presents emergency department today with some dizziness and headache. States this is been present for over 2 weeks. Notes a mild aching left-sided headache associated with dizziness. States she feels very off balance and dizzy. No obvious alleviating factors. Worse if she turns her head in certain directions. Denies head injury or trauma. No loss of consciousness. No chest pain or shortness of breath. Denies numbness, tingling or focal weakness. Was seen 2 weeks ago for the same complaint found to have evidence of parotitis states she has been treating this at home and that has improved. Notably, parotitis was on the right side she is having no right-sided head or face pain at this time. On her recent work-up she was also found to have significant left-sided subclavian stenosis with diminished flow in the left vertebral artery and followed up with vascular surgery as an outpatient who plans to monitor the situation that she is otherwise generally asymptomatic. [facility]    Nursing Notes were reviewed.     REVIEW OF SYSTEMS    (2-9 systems for level 4, 10 or more for level 5)     Review of Systems    Please see HPI for pertinent positive and negative review of system findings. A full 10 system ROS was performed and otherwise negative. PAST MEDICAL HISTORY     Past Medical History:   Diagnosis Date    Allergic rhinitis     Hearing loss     HTN (hypertension)     Hyperlipidemia     No history of procedure 2/5/16    colonoscopy    Type II or unspecified type diabetes mellitus without mention of complication, not stated as uncontrolled          SURGICAL HISTORY       Past Surgical History:   Procedure Laterality Date    COLONOSCOPY  02/05/2016    normal    TUBAL LIGATION           CURRENT MEDICATIONS       Previous Medications    ASPIRIN 81 MG TABLET    Take 162 mg by mouth daily     CALCIUM CARBONATE (OSCAL) 500 MG TABS TABLET    Take 2 tablets by mouth daily    CETIRIZINE (ZYRTEC ALLERGY) 10 MG TABLET    Take 1 tablet by mouth daily    EMPAGLIFLOZIN (JARDIANCE) 25 MG TABLET    Take 1 tablet by mouth daily    FLUTICASONE (FLONASE) 50 MCG/ACT NASAL SPRAY    In each nostril daily    GLIMEPIRIDE (AMARYL) 4 MG TABLET    TAKE ONE TABLET BY MOUTH TWICE A DAY    LISINOPRIL (PRINIVIL;ZESTRIL) 5 MG TABLET    Take 1 tablet by mouth daily    OMEGA-3 FATTY ACIDS (OMEGA 3 PO)    Take by mouth    ROSUVASTATIN (CRESTOR) 5 MG TABLET    TAKE ONE TABLET BY MOUTH ONCE NIGHTLY    VITAMIN D-3 (CHOLECALCIFEROL) 5000 UNITS TABS    Take 1 tablet by mouth daily       ALLERGIES     Codeine;  Fenofibrate; and Metformin and related    FAMILY HISTORY       Family History   Problem Relation Age of Onset    Diabetes Mother     Kidney Disease Mother     Heart Disease Mother     Heart Disease Father     High Blood Pressure Father           SOCIAL HISTORY       Social History     Socioeconomic History    Marital status:      Spouse name: None    Number of children: None    Years of education: None    Highest education level: None   Occupational History    None   Social Needs    Financial resource strain: None    Food insecurity Worry: None     Inability: None    Transportation needs     Medical: None     Non-medical: None   Tobacco Use    Smoking status: Former Smoker     Packs/day: 1.00     Years: 8.00     Pack years: 8.00     Types: Cigarettes     Last attempt to quit: 1983     Years since quittin.3    Smokeless tobacco: Never Used    Tobacco comment: pt has passive smoke exposure - spouse smokes in the house   Substance and Sexual Activity    Alcohol use: No     Alcohol/week: 0.0 standard drinks    Drug use: No    Sexual activity: Yes     Partners: Male   Lifestyle    Physical activity     Days per week: None     Minutes per session: None    Stress: None   Relationships    Social connections     Talks on phone: None     Gets together: None     Attends Muslim service: None     Active member of club or organization: None     Attends meetings of clubs or organizations: None     Relationship status: None    Intimate partner violence     Fear of current or ex partner: None     Emotionally abused: None     Physically abused: None     Forced sexual activity: None   Other Topics Concern    None   Social History Narrative    None       SCREENINGS               PHYSICAL EXAM    (up to 7 for level 4, 8 or more for level 5)     ED Triage Vitals   BP Temp Temp Source Pulse Resp SpO2 Height Weight   10/20/20 1122 10/20/20 1121 10/20/20 1121 10/20/20 1121 10/20/20 1121 10/20/20 1121 -- 10/20/20 1121   (!) 197/83 97.9 °F (36.6 °C) Oral 89 18 96 %  225 lb (102.1 kg)       Physical Exam    General appearance:  Cooperative. No acute distress. Skin:  Warm. Dry. Eye:  Extraocular movements intact. Pupils are equally round and reactive to light and accommodation. Extraocular motions are intact. CN II-XII intact. Ears, nose, mouth and throat:  Oral mucosa moist,  Neck:  Trachea midline. Heart:  Regular rate and rhythm  Perfusion:  intact  Respiratory:  Lungs clear to auscultation bilaterally. Respirations nonlabored. [phone]   URINALYSIS - Abnormal; Notable for the following components:    Glucose, Ur >=1000 (*)     Leukocyte Esterase, Urine TRACE (*)     All other components within normal limits    Narrative:     Performed at:  [facility] [address],  [place], [facility]   Phone [phone]   MICROSCOPIC URINALYSIS - Abnormal; Notable for the following components:    WBC, UA 6-9 (*)     Bacteria, UA 1+ (*)     All other components within normal limits    Narrative:     Performed at:  [facility] [address],  [place], [facility]   Phone [phone]   PROTIME-INR    Narrative:     Performed at:  [place] [address],  [place], [facility]   Phone [phone]   APTT    Narrative:     Performed at:  [facility],  [place], [place]   Phone [phone]       Interpretation per the Radiologist below, if obtained/available at the time of this note:    CT Head WO Contrast   Final Result   1. No acute intracranial abnormality. All other labs/imaging were within normal range or not returned as of this dictation. EMERGENCY DEPARTMENT COURSE and DIFFERENTIAL DIAGNOSIS/MDM:   Vitals:    Vitals:    10/20/20 1219 10/20/20 1235 10/20/20 1335 10/20/20 1405   BP: (!) 170/74 (!) 160/70 (!) 154/70 (!) 149/74   Pulse: 78 76 71 72   Resp: 14      Temp:       TempSrc:       SpO2: 95% 94% 93% 96%   Weight:           EKG: Sinus rhythm rate of 81 bpm with incomplete right bundle branch block. No ST elevation. Q wave in lead III. Compared to prior from 10/5/2020 no change. Patient presents emergency department today with dizziness and some mild headache. Symptoms have been present for over 2 weeks. Had recent CT/CTA of the head showing significant left-sided subclavian artery stenosis with diminished left vertebral artery flow.   Repeat head CT here

## 2020-10-20 NOTE — H&P
Hospital Medicine History & Physical      PCP: Miguel Cain MD    Date of Admission: 10/20/2020    Date of Service: Pt seen/examined on 10/20/2020 and Admitted to observation with expected LOS less than two midnights due to medical therapy. Chief Complaint: Dizziness and headache    History Of Present Illness:      63 y.o. female, with PMH of HTN, HLD, DM2, and subclavian artery stenosis, who was a direct admission to [facility] from [facility] ED with dizziness and headache. History was obtained from the patient and review of the EMR. The patient presents today with dizziness and headache that has been persistent over the past 2 weeks. She states that she overall just feels off balance and this worsens when she turns her head in certain directions. She came to the ED for evaluation on 10/5/2020 and was noted to have severe left subclavian artery stenosis and right parotiditis. She was given urgent referral to see vascular surgery. She was given referral to see vascular surgery for this. On 10/9/2020, the patient followed up with Dr. Dalila Isabel, vascular surgeon. During her visit, the patient only reported some occasional numbness and denied having any lightheadedness or dizziness. It was decided at that time that she would not have any arterial intervention unless her symptoms were to worsen. The patient states that while just laying in bed, she still feels dizzy. She does admit that movement of her left arm does seem to exacerbate her symptoms even more, causing her head to throb and the dizziness to worsen.     Past Medical History:          Diagnosis Date    Allergic rhinitis     Dizziness 10/20/2020    Hearing loss     HTN (hypertension)     Hyperlipidemia     No history of procedure 2/5/16    colonoscopy    Type II or unspecified type diabetes mellitus without mention of complication, not stated as uncontrolled        Past Surgical History:          Procedure  Heart Disease Father     High Blood Pressure Father        REVIEW OF SYSTEMS:   Pertinent positives as noted in the HPI. All other systems reviewed and negative. PHYSICAL EXAM PERFORMED:    BP (!) 180/82   Pulse 79   Temp 97.3 °F (36.3 °C) (Oral)   Resp 18   SpO2 96%     General appearance: Pleasant female in no apparent distress, appears stated age and cooperative. HEENT:  Normal cephalic, atraumatic without obvious deformity. Pupils equal, round, and reactive to light. Extra ocular muscles intact. Conjunctivae/corneas clear. Neck: Supple, with full range of motion. No jugular venous distention. Trachea midline. Respiratory:  Normal respiratory effort. Clear to auscultation, bilaterally without Rales/Wheezes/Rhonchi. Cardiovascular:  Regular rate and rhythm with normal S1/S2 without murmurs, rubs or gallops. Abdomen: Soft, non-tender, non-distended with normal bowel sounds. Musculoskeletal:  No clubbing, cyanosis or edema bilaterally. Full range of motion without deformity. Skin: Skin color, texture, turgor normal.  No rashes or lesions. Neurologic:  Neurovascularly intact without any focal sensory/motor deficits. Cranial nerves: II-XII intact, grossly non-focal.  Psychiatric:  Alert and oriented, thought content appropriate, normal insight  Capillary Refill: Brisk,< 3 seconds   Peripheral Pulses: +2 palpable, slightly weaker on left radial      Labs:     Recent Labs     10/20/20  1210   WBC 6.3   HGB 14.7   HCT 44.3        Recent Labs     10/20/20  1210      K 4.5      CO2 26   BUN 23*   CREATININE 0.8   CALCIUM 9.8     Recent Labs     10/20/20  1210   AST 21   ALT 20   BILITOT 0.3   ALKPHOS 72     Recent Labs     10/20/20  1210   INR 0.92     No results for input(s): Kandis Gaona in the last 72 hours.     Urinalysis:      Lab Results   Component Value Date    NITRU Negative 10/20/2020    WBCUA 6-9 10/20/2020    BACTERIA 1+ 10/20/2020    RBCUA 0-2 10/20/2020    BLOODU Negative 10/20/2020    SPECGRAV <=1.005 10/20/2020    GLUCOSEU >=1000 10/20/2020       Radiology:     CXR: I have reviewed the CXR with the following interpretation: n/a  EKG:  I have reviewed the EKG with the following interpretation: Normal sinus rhythm Incomplete right bundle branch block Borderline ECG Confirmed by DOMENICO STRICKLAND, [address] ([id number]) on 10/20/2020 3:06:48 PM    No orders to display       ASSESSMENT:    ALEKS/[facility] [address] Problems    Diagnosis Date Noted    Dizziness [R42] 10/20/2020       PLAN:    Dizziness in setting of vertebrobasilar insufficiency 2/2 severe proximal left subclavian artery stenosis, possible subclavian steal syndrome  - CTA head and neck on 10/5/2020 revealed: Severe stenosis of proximal left subclavian artery with diminished contrast and hypoplastic left vertebral artery likely related to slow flow. Predisposes the patient to subclavian steal syndrome. Otherwise no significant arterial stenosis in her neck. Mild acute uncomplicated right parotiditis. - CTH unremarkable  - EKG: Normal sinus rhythm Incomplete right bundle branch block Borderline ECG Confirmed by Jeana Frankel MD, [address] ([id number]) on 10/20/2020 3:06:48 PM  - Vascular surgery consulted, patient follows with Dr. Christy Posey and was told that she may require angiogram with angioplasty/stenting if her symptoms increase/worsen. Thank you for recs    Parotiditis on previous CTA, stable. Follows with ENT for this outpatient.   - Note from 10/15/2020 --> continue conservative management with warm compresses, sialagogues, significant water intake  - Supportive care    Essential HTN, not well controlled. - Continue home lisinopril  - Telemetry monitoring    Hx of HLD, controlled with statin. - Continue home crestor  - Follow-up with PCP for med adjustments    DM2, uncontrolled.   -  on admission  - Hemoglobin a1c 7.0 on 8/17/2020  - LDSSI, hold home oral antidiabetics  - POCT ac/hs  - Hypoglycemia protocol  - Carb control diet    Morbid

## 2020-10-21 LAB
A/G RATIO: 1.5 (ref 1.1–2.2)
ALBUMIN SERPL-MCNC: 4.5 G/DL (ref 3.4–5)
ALP BLD-CCNC: 59 U/L (ref 40–129)
ALT SERPL-CCNC: 20 U/L (ref 10–40)
ANION GAP SERPL CALCULATED.3IONS-SCNC: 12 MMOL/L (ref 3–16)
AST SERPL-CCNC: 21 U/L (ref 15–37)
BASOPHILS ABSOLUTE: 0 K/UL (ref 0–0.2)
BASOPHILS RELATIVE PERCENT: 0.7 %
BILIRUB SERPL-MCNC: 0.3 MG/DL (ref 0–1)
BUN BLDV-MCNC: 19 MG/DL (ref 7–20)
CALCIUM SERPL-MCNC: 9.1 MG/DL (ref 8.3–10.6)
CHLORIDE BLD-SCNC: 100 MMOL/L (ref 99–110)
CO2: 25 MMOL/L (ref 21–32)
CREAT SERPL-MCNC: 0.7 MG/DL (ref 0.6–1.2)
EOSINOPHILS ABSOLUTE: 0.1 K/UL (ref 0–0.6)
EOSINOPHILS RELATIVE PERCENT: 3 %
GFR AFRICAN AMERICAN: >60
GFR NON-AFRICAN AMERICAN: >60
GLOBULIN: 3 G/DL
GLUCOSE BLD-MCNC: 103 MG/DL (ref 70–99)
GLUCOSE BLD-MCNC: 104 MG/DL (ref 70–99)
GLUCOSE BLD-MCNC: 115 MG/DL (ref 70–99)
GLUCOSE BLD-MCNC: 140 MG/DL (ref 70–99)
GLUCOSE BLD-MCNC: 95 MG/DL (ref 70–99)
HCT VFR BLD CALC: 45.1 % (ref 36–48)
HEMOGLOBIN: 15 G/DL (ref 12–16)
LYMPHOCYTES ABSOLUTE: 1.5 K/UL (ref 1–5.1)
LYMPHOCYTES RELATIVE PERCENT: 31.1 %
MCH RBC QN AUTO: 27.9 PG (ref 26–34)
MCHC RBC AUTO-ENTMCNC: 33.3 G/DL (ref 31–36)
MCV RBC AUTO: 83.8 FL (ref 80–100)
MONOCYTES ABSOLUTE: 0.4 K/UL (ref 0–1.3)
MONOCYTES RELATIVE PERCENT: 7.9 %
NEUTROPHILS ABSOLUTE: 2.8 K/UL (ref 1.7–7.7)
NEUTROPHILS RELATIVE PERCENT: 57.3 %
PDW BLD-RTO: 14.3 % (ref 12.4–15.4)
PERFORMED ON: ABNORMAL
PERFORMED ON: NORMAL
PLATELET # BLD: 215 K/UL (ref 135–450)
PMV BLD AUTO: 8.4 FL (ref 5–10.5)
POTASSIUM REFLEX MAGNESIUM: 4 MMOL/L (ref 3.5–5.1)
RBC # BLD: 5.38 M/UL (ref 4–5.2)
SODIUM BLD-SCNC: 137 MMOL/L (ref 136–145)
TOTAL PROTEIN: 7.5 G/DL (ref 6.4–8.2)
TROPONIN: <0.01 NG/ML
WBC # BLD: 4.9 K/UL (ref 4–11)

## 2020-10-21 PROCEDURE — 80053 COMPREHEN METABOLIC PANEL: CPT

## 2020-10-21 PROCEDURE — 85025 COMPLETE CBC W/AUTO DIFF WBC: CPT

## 2020-10-21 PROCEDURE — 36415 COLL VENOUS BLD VENIPUNCTURE: CPT

## 2020-10-21 PROCEDURE — G0378 HOSPITAL OBSERVATION PER HR: HCPCS

## 2020-10-21 PROCEDURE — 6360000002 HC RX W HCPCS: Performed by: NURSE PRACTITIONER

## 2020-10-21 PROCEDURE — 96372 THER/PROPH/DIAG INJ SC/IM: CPT

## 2020-10-21 PROCEDURE — 2580000003 HC RX 258: Performed by: NURSE PRACTITIONER

## 2020-10-21 PROCEDURE — 6370000000 HC RX 637 (ALT 250 FOR IP): Performed by: NURSE PRACTITIONER

## 2020-10-21 PROCEDURE — 93880 EXTRACRANIAL BILAT STUDY: CPT

## 2020-10-21 RX ADMIN — Medication 10 ML: at 08:16

## 2020-10-21 RX ADMIN — ENOXAPARIN SODIUM 40 MG: 40 INJECTION SUBCUTANEOUS at 08:15

## 2020-10-21 RX ADMIN — FLUTICASONE PROPIONATE 1 SPRAY: 50 SPRAY, METERED NASAL at 08:16

## 2020-10-21 RX ADMIN — CETIRIZINE HYDROCHLORIDE 10 MG: 10 TABLET, FILM COATED ORAL at 08:15

## 2020-10-21 RX ADMIN — LISINOPRIL 5 MG: 5 TABLET ORAL at 08:14

## 2020-10-21 RX ADMIN — ROSUVASTATIN CALCIUM 5 MG: 10 TABLET, FILM COATED ORAL at 19:28

## 2020-10-21 RX ADMIN — Medication 10 ML: at 19:29

## 2020-10-21 RX ADMIN — ASPIRIN 325 MG ORAL TABLET 162 MG: 325 PILL ORAL at 08:12

## 2020-10-21 ASSESSMENT — PAIN SCALES - GENERAL
PAINLEVEL_OUTOF10: 3
PAINLEVEL_OUTOF10: 3

## 2020-10-21 ASSESSMENT — PAIN DESCRIPTION - DESCRIPTORS: DESCRIPTORS: HEADACHE

## 2020-10-21 NOTE — PROGRESS NOTES
Hospitalist Progress Note    Date of Admission: 10/20/2020    Chief Complaint: Dizziness    Hospital Course:      63 y.o. female, with PMH of HTN, HLD, DM2, and subclavian artery stenosis, who was a direct admission to [facility] from [facility] ED with dizziness and headache. History was obtained from the patient and review of the EMR. The patient presents today with dizziness and headache that has been persistent over the past 2 weeks. She states that she overall just feels off balance and this worsens when she turns her head in certain directions. She came to the ED for evaluation on 10/5/2020 and was noted to have severe left subclavian artery stenosis and right parotiditis. She was given urgent referral to see vascular surgery. She was given referral to see vascular surgery for this. On 10/9/2020, the patient followed up with Dr. Moises Hidalgo, vascular surgeon. During her visit, the patient only reported some occasional numbness and denied having any lightheadedness or dizziness. It was decided at that time that she would not have any arterial intervention unless her symptoms were to worsen. The patient states that while just laying in bed, she still feels dizzy. She does admit that movement of her left arm does seem to exacerbate her symptoms even more, causing her head to throb and the dizziness to worsen. General appearance: Pleasant female in no apparent distress, appears stated age and cooperative. HEENT:  Normal cephalic, atraumatic without obvious deformity. Pupils equal, round, and reactive to light. Extra ocular muscles intact. Conjunctivae/corneas clear. Neck: Supple, with full range of motion. No jugular venous distention. Trachea midline. Respiratory:  Normal respiratory effort. Clear to auscultation, bilaterally without Rales/Wheezes/Rhonchi. Cardiovascular:  Regular rate and rhythm with normal S1/S2 without murmurs, rubs or gallops.   Abdomen: Soft, non-tender, non-distended with normal bowel sounds. Musculoskeletal:  No clubbing, cyanosis or edema bilaterally. Full range of motion without deformity. Skin: Skin color, texture, turgor normal.  No rashes or lesions. Neurologic:  Neurovascularly intact without any focal sensory/motor deficits. Cranial nerves: II-XII intact, grossly non-focal.  Psychiatric:  Alert and oriented, thought content appropriate, normal insight  Capillary Refill: Brisk,< 3 seconds   Peripheral Pulses: +2 palpable, slightly weaker on left radial    Subjective: She reports ongoing dizziness with room spinning. Ongoing headache. No nausea or vomiting currently. Labs:   Recent Labs     10/20/20  1210 10/21/20  0832   WBC 6.3 4.9   HGB 14.7 15.0   HCT 44.3 45.1    215     Recent Labs     10/20/20  1210 10/21/20  0832    137   K 4.5 4.0    100   CO2 26 25   BUN 23* 19   CREATININE 0.8 0.7   CALCIUM 9.8 9.1     Recent Labs     10/20/20  1210 10/21/20  0832   AST 21 21   ALT 20 20   BILITOT 0.3 0.3   ALKPHOS 72 59     Recent Labs     10/20/20  1210   INR 0.92       Physical Exam Performed:    BP (!) 154/82   Pulse 75   Temp 98.9 °F (37.2 °C) (Oral)   Resp 18   SpO2 92%     General appearance: No apparent distress, appears stated age and cooperative. HEENT: Pupils equal, round, and reactive to light. Conjunctivae/corneas clear. Neck: Supple, no jugular venous distention. Trachea midline with full range of motion. Respiratory:  Normal respiratory effort. Clear to auscultation, bilaterally without Rales/Wheezes/Rhonchi. Cardiovascular: Regular rate and rhythm with normal S1/S2 without murmurs, rubs or gallops. Abdomen: Soft, non-tender, non-distended with normal bowel sounds. Musculoskelatal: No clubbing, cyanosis or edema bilaterally. Full range of motion without deformity. Neurologic:  Neurovascularly intact without any focal sensory/motor deficits.  Cranial nerves: II-XII intact, grossly non-focal.  Psychiatric: Alert and oriented, thought content appropriate, normal insight  Skin: Skin color, texture, turgor normal.  No rashes or lesions. Capillary Refill: Brisk,< 3 seconds   Peripheral Pulses: +2 palpable, equal bilaterally       Assessment/Plan:    Active Hospital Problems    Diagnosis    Dizziness [R42]    Parotiditis [K11.20]    Subclavian artery stenosis, left (HCC) [I77.1]    Type 2 diabetes mellitus ([facility] [address] [E11.9]    Mixed hyperlipidemia [E78.2]     Dizziness in setting of vertebrobasilar insufficiency 2/2 severe proximal left subclavian artery stenosis, possible subclavian steal syndrome  - CTA head and neck on 10/5/2020 revealed: Severe stenosis of proximal left subclavian artery with diminished contrast and hypoplastic left vertebral artery likely related to slow flow. Predisposes the patient to subclavian steal syndrome. Otherwise no significant arterial stenosis in her neck. Mild acute uncomplicated right parotiditis. -Recently seen by Dr. Chucky Phelan in the office; consulted here for input. Recommended carotid Doppler to determine if any role for intervention. Parotiditis on previous CTA, stable. Follows with ENT for this outpatient.   - Note from 10/15/2020 --> continue conservative management with warm compresses, sialagogues, significant water intake  - Supportive care    Essential HTN, not well controlled. - Continue home lisinopril  - Telemetry monitoring    Hx of HLD, controlled with statin. - Continue home crestor  - Follow-up with PCP for med adjustments    DM2, uncontrolled. -  on admission  - Hemoglobin a1c 7.0 on 8/17/2020  - LDSSI, hold home oral antidiabetics  - POCT ac/hs  - Hypoglycemia protocol  - Carb control diet    Morbid Obesity -  With BMI ~40.5. Complicating assessment and treatment. Placing patient at risk for multiple co-morbidities as well as early death and contributing to the patient's presentation.  Counseled on weight loss    DVT Prophylaxis: Lovenox  Diet: DIET CARB CONTROL; Carb Control: 5 carb choices (75 gms)/meal  Code Status: Full Code  PT/OT Eval Status: NA    Dispo -1 to 2 days    Dano Almazan MD

## 2020-10-21 NOTE — CONSULTS
Vascular Surgery    See my office note 10/9. Admitted with continued dizziness and headache. CTA re reviewed.   R Vert origin stenosis and L subclavian stenosis with hypoplastiv left Vertebral.    Will order Carotid Duplex to see in there is retrograde flow in Left Vertebral.  If so then treatment may be beneficial.

## 2020-10-21 NOTE — PLAN OF CARE
Problem: Falls - Risk of:  Goal: Will remain free from falls  Description: Will remain free from falls  Outcome: Ongoing     Problem: Pain:  Goal: Pain level will decrease  Description: Pain level will decrease  Outcome: Ongoing     Problem: Pain:  Goal: Control of acute pain  Description: Control of acute pain  Outcome: Ongoing

## 2020-10-22 LAB
ANION GAP SERPL CALCULATED.3IONS-SCNC: 13 MMOL/L (ref 3–16)
BUN BLDV-MCNC: 17 MG/DL (ref 7–20)
CALCIUM SERPL-MCNC: 9.2 MG/DL (ref 8.3–10.6)
CHLORIDE BLD-SCNC: 103 MMOL/L (ref 99–110)
CO2: 23 MMOL/L (ref 21–32)
CREAT SERPL-MCNC: 0.7 MG/DL (ref 0.6–1.2)
GFR AFRICAN AMERICAN: >60
GFR NON-AFRICAN AMERICAN: >60
GLUCOSE BLD-MCNC: 109 MG/DL (ref 70–99)
GLUCOSE BLD-MCNC: 115 MG/DL (ref 70–99)
GLUCOSE BLD-MCNC: 116 MG/DL (ref 70–99)
GLUCOSE BLD-MCNC: 169 MG/DL (ref 70–99)
GLUCOSE BLD-MCNC: 79 MG/DL (ref 70–99)
PERFORMED ON: ABNORMAL
PERFORMED ON: NORMAL
POTASSIUM REFLEX MAGNESIUM: 4.3 MMOL/L (ref 3.5–5.1)
SODIUM BLD-SCNC: 139 MMOL/L (ref 136–145)

## 2020-10-22 PROCEDURE — 6360000004 HC RX CONTRAST MEDICATION

## 2020-10-22 PROCEDURE — 6370000000 HC RX 637 (ALT 250 FOR IP)

## 2020-10-22 PROCEDURE — 6370000000 HC RX 637 (ALT 250 FOR IP): Performed by: NURSE PRACTITIONER

## 2020-10-22 PROCEDURE — 99152 MOD SED SAME PHYS/QHP 5/>YRS: CPT | Performed by: SURGERY

## 2020-10-22 PROCEDURE — 37236 OPEN/PERQ PLACE STENT 1ST: CPT | Performed by: SURGERY

## 2020-10-22 PROCEDURE — 36415 COLL VENOUS BLD VENIPUNCTURE: CPT

## 2020-10-22 PROCEDURE — 36226 PLACE CATH VERTEBRAL ART: CPT

## 2020-10-22 PROCEDURE — 6360000002 HC RX W HCPCS

## 2020-10-22 PROCEDURE — C1894 INTRO/SHEATH, NON-LASER: HCPCS

## 2020-10-22 PROCEDURE — 03743DZ DILATION OF LEFT SUBCLAVIAN ARTERY WITH INTRALUMINAL DEVICE, PERCUTANEOUS APPROACH: ICD-10-PCS | Performed by: SURGERY

## 2020-10-22 PROCEDURE — 99153 MOD SED SAME PHYS/QHP EA: CPT

## 2020-10-22 PROCEDURE — 76937 US GUIDE VASCULAR ACCESS: CPT | Performed by: SURGERY

## 2020-10-22 PROCEDURE — C1725 CATH, TRANSLUMIN NON-LASER: HCPCS

## 2020-10-22 PROCEDURE — 36226 PLACE CATH VERTEBRAL ART: CPT | Performed by: SURGERY

## 2020-10-22 PROCEDURE — B31F1ZZ FLUOROSCOPY OF LEFT VERTEBRAL ARTERY USING LOW OSMOLAR CONTRAST: ICD-10-PCS | Performed by: SURGERY

## 2020-10-22 PROCEDURE — 2060000000 HC ICU INTERMEDIATE R&B

## 2020-10-22 PROCEDURE — 75716 ARTERY X-RAYS ARMS/LEGS: CPT

## 2020-10-22 PROCEDURE — C1887 CATHETER, GUIDING: HCPCS

## 2020-10-22 PROCEDURE — C1769 GUIDE WIRE: HCPCS

## 2020-10-22 PROCEDURE — 2709999900 HC NON-CHARGEABLE SUPPLY

## 2020-10-22 PROCEDURE — B3121ZZ FLUOROSCOPY OF LEFT SUBCLAVIAN ARTERY USING LOW OSMOLAR CONTRAST: ICD-10-PCS | Performed by: SURGERY

## 2020-10-22 PROCEDURE — C1874 STENT, COATED/COV W/DEL SYS: HCPCS

## 2020-10-22 PROCEDURE — 99152 MOD SED SAME PHYS/QHP 5/>YRS: CPT

## 2020-10-22 PROCEDURE — 36225 PLACE CATH SUBCLAVIAN ART: CPT | Performed by: SURGERY

## 2020-10-22 PROCEDURE — 36216 PLACE CATHETER IN ARTERY: CPT

## 2020-10-22 PROCEDURE — B3101ZZ FLUOROSCOPY OF THORACIC AORTA USING LOW OSMOLAR CONTRAST: ICD-10-PCS | Performed by: SURGERY

## 2020-10-22 PROCEDURE — 2500000003 HC RX 250 WO HCPCS

## 2020-10-22 PROCEDURE — C1760 CLOSURE DEV, VASC: HCPCS

## 2020-10-22 PROCEDURE — 2580000003 HC RX 258: Performed by: NURSE PRACTITIONER

## 2020-10-22 PROCEDURE — 80048 BASIC METABOLIC PNL TOTAL CA: CPT

## 2020-10-22 PROCEDURE — 37236 OPEN/PERQ PLACE STENT 1ST: CPT

## 2020-10-22 RX ORDER — MIDAZOLAM HYDROCHLORIDE 5 MG/ML
INJECTION INTRAMUSCULAR; INTRAVENOUS
Status: COMPLETED | OUTPATIENT
Start: 2020-10-22 | End: 2020-10-22

## 2020-10-22 RX ORDER — ASPIRIN 81 MG/1
81 TABLET, CHEWABLE ORAL DAILY
Status: DISCONTINUED | OUTPATIENT
Start: 2020-10-23 | End: 2020-10-23 | Stop reason: HOSPADM

## 2020-10-22 RX ORDER — HEPARIN SODIUM 1000 [USP'U]/ML
INJECTION, SOLUTION INTRAVENOUS; SUBCUTANEOUS
Status: COMPLETED | OUTPATIENT
Start: 2020-10-22 | End: 2020-10-22

## 2020-10-22 RX ORDER — FENTANYL CITRATE 50 UG/ML
INJECTION, SOLUTION INTRAMUSCULAR; INTRAVENOUS
Status: COMPLETED | OUTPATIENT
Start: 2020-10-22 | End: 2020-10-22

## 2020-10-22 RX ORDER — CLOPIDOGREL BISULFATE 75 MG/1
75 TABLET ORAL DAILY
Status: DISCONTINUED | OUTPATIENT
Start: 2020-10-23 | End: 2020-10-23 | Stop reason: HOSPADM

## 2020-10-22 RX ORDER — CLOPIDOGREL BISULFATE 75 MG/1
150 TABLET ORAL ONCE
Status: COMPLETED | OUTPATIENT
Start: 2020-10-22 | End: 2020-10-22

## 2020-10-22 RX ORDER — CLOPIDOGREL 300 MG/1
TABLET, FILM COATED ORAL
Status: COMPLETED | OUTPATIENT
Start: 2020-10-22 | End: 2020-10-22

## 2020-10-22 RX ADMIN — HEPARIN SODIUM 2000 UNITS: 1000 INJECTION, SOLUTION INTRAVENOUS; SUBCUTANEOUS at 14:20

## 2020-10-22 RX ADMIN — MIDAZOLAM HYDROCHLORIDE 1 MG: 5 INJECTION INTRAMUSCULAR; INTRAVENOUS at 15:14

## 2020-10-22 RX ADMIN — LISINOPRIL 5 MG: 5 TABLET ORAL at 09:49

## 2020-10-22 RX ADMIN — CLOPIDOGREL BISULFATE 75 MG: 75 TABLET ORAL at 15:28

## 2020-10-22 RX ADMIN — HEPARIN SODIUM 3000 UNITS: 1000 INJECTION, SOLUTION INTRAVENOUS; SUBCUTANEOUS at 14:41

## 2020-10-22 RX ADMIN — Medication 10 ML: at 21:14

## 2020-10-22 RX ADMIN — CLOPIDOGREL 150 MG: 300 TABLET, FILM COATED ORAL at 15:23

## 2020-10-22 RX ADMIN — INSULIN LISPRO 1 UNITS: 100 INJECTION, SOLUTION INTRAVENOUS; SUBCUTANEOUS at 21:15

## 2020-10-22 RX ADMIN — MIDAZOLAM HYDROCHLORIDE 1 MG: 5 INJECTION INTRAMUSCULAR; INTRAVENOUS at 15:20

## 2020-10-22 RX ADMIN — ASPIRIN 325 MG ORAL TABLET 162 MG: 325 PILL ORAL at 09:50

## 2020-10-22 RX ADMIN — MIDAZOLAM HYDROCHLORIDE 1 MG: 5 INJECTION INTRAMUSCULAR; INTRAVENOUS at 14:40

## 2020-10-22 RX ADMIN — FENTANYL CITRATE 25 MCG: 50 INJECTION, SOLUTION INTRAMUSCULAR; INTRAVENOUS at 15:20

## 2020-10-22 RX ADMIN — FENTANYL CITRATE 25 MCG: 50 INJECTION, SOLUTION INTRAMUSCULAR; INTRAVENOUS at 14:58

## 2020-10-22 RX ADMIN — MIDAZOLAM HYDROCHLORIDE 2 MG: 5 INJECTION INTRAMUSCULAR; INTRAVENOUS at 14:13

## 2020-10-22 RX ADMIN — MIDAZOLAM HYDROCHLORIDE 1 MG: 5 INJECTION INTRAMUSCULAR; INTRAVENOUS at 14:58

## 2020-10-22 RX ADMIN — ROSUVASTATIN CALCIUM 5 MG: 10 TABLET, FILM COATED ORAL at 21:14

## 2020-10-22 RX ADMIN — FENTANYL CITRATE 25 MCG: 50 INJECTION, SOLUTION INTRAMUSCULAR; INTRAVENOUS at 14:23

## 2020-10-22 RX ADMIN — CETIRIZINE HYDROCHLORIDE 10 MG: 10 TABLET, FILM COATED ORAL at 09:49

## 2020-10-22 RX ADMIN — FENTANYL CITRATE 25 MCG: 50 INJECTION, SOLUTION INTRAMUSCULAR; INTRAVENOUS at 14:13

## 2020-10-22 ASSESSMENT — PAIN SCALES - GENERAL
PAINLEVEL_OUTOF10: 2
PAINLEVEL_OUTOF10: 0
PAINLEVEL_OUTOF10: 0

## 2020-10-22 ASSESSMENT — PAIN DESCRIPTION - LOCATION: LOCATION: HEAD

## 2020-10-22 NOTE — PROGRESS NOTES
Pt transferred to  post angiogram. transfer report given to Mariana Maldonado [facility].

## 2020-10-22 NOTE — OP NOTE
[facility]                                OPERATIVE REPORT    PATIENT NAME: Vickey Mckeon                     :        1955  MED REC NO:   [id number]                          ROOM:       6709  ACCOUNT NO:   [de-identified]                           ADMIT DATE: 10/20/2020  PROVIDER:     Sierra Zamarripa MD    DATE OF PROCEDURE:  10/22/2020    ANGIOGRAM PROCEDURE REPORT    PREOPERATIVE DIAGNOSES:  Dizziness, headache and question of  vertebrobasilar insufficiency. POSTOPERATIVE DIAGNOSES:  Dizziness, headache, question of  vertebrobasilar insufficiency with widely patent right vertebral artery  and stagnant flow in the left vertebral artery secondary to high-grade  proximal left subclavian stenosis. PROCEDURES PERFORMED:  1. Ultrasound-guided right femoral artery access. 2.  Arch aortogram.  3.  Bilateral selective subclavian artery angiograms. 4.  Selective catheterization and angiography of the left vertebral  artery. 5.  Angioplasty and stenting of the proximal left subclavian artery. SURGEON:  Sierra Zamarripa MD    ANESTHESIA:  Local with moderate monitored sedation. INDICATIONS:  The patient is a 71-year-old female with severe dizziness  and headaches and symptoms consistent with possible vertebrobasilar  insufficiency. She had undergone CT angiography, which suggested  high-grade right proximal vertebral artery stenosis and a significant  left subclavian artery stenosis. Carotid duplex examination, however,  demonstrated the right vertebral artery to be widely patent and no flow  was demonstrated in the left vertebral artery. The patient is brought  to the angio suite at this time to undergo angiography to clarify the  anatomy and the degree of stenosis and possible treatment. OPERATIVE PROCEDURE:  The patient is brought to the angio suite, placed  in supine position.   Under my direct supervision Versed and fentanyl  were administered for moderate sedation. The patient was monitored by  an independent trained nurse observer using continuous blood pressure,  EKG and pulse oximetry. I spent 70 minutes face-to-face with the  patient providing and directing sedation. The right femoral region was  then prepped and draped in sterile fashion. Ultrasound was used to  identify the common femoral bifurcation. The common femoral artery was  patent, pulsatile and free of disease. The anterior wall was punctured  under direct ultrasound guidance and a 5-[place]uvian introducer sheath was  placed. A digital copy of the ultrasound image was saved and placed in  the patient's record. Bentson wire was advanced into the aortic arch  and a pigtail catheter was advanced over the wire. The patient was  given 2000 units of intravenous heparin. Arch aortogram was then  performed. The right and left subclavian arteries were then selected  using JB1 catheter and an advantage Glidewire. In addition, the catheter was positioned right at the origin of the left  vertebral artery to obtain selective left vertebral angiogram to ensure  patency. The JB1 catheter was then removed over an [address] The 5-[place]uvian sheath in the right groin was removed and replaced with a  6-[place]uvian 90 cm long Brite Tip sheath, which was then advanced to the  origin of the left subclavian artery. The patient was given additional  3000 units of intravenous heparin. Angioplasty was performed in the  proximal subclavian artery using a 4 x 20 balloon and then placed a 6 mm  x 22 mm iCAST stent within the proximal subclavian artery ensuring that  the origin of the vertebral artery was not covered. The repeat  angiograms demonstrated the stent was slightly undersized. Therefore, I  post dilated this with an 8 x 20 balloon. The proximal portion of the  stent protruding slightly into the aortic arch was flared using a 10 x  20 balloon.   Repeat angiography was performed confirming excellent stent  placement. The long sheath was then removed over the wire, replaced  with a short 6-[place] Island[place] introducer sheath. Retrograde right femoral  angiogram was then performed and a 6-[place] Islander Mynx closure device was  placed, deployed in standard fashion achieving excellent hemostasis. The patient was then transferred to the recovery area in stable  condition. Estimated blood loss throughout the procedure was minimal.    ANGIOGRAPHIC FINDINGS:  Aortic arch demonstrates normal branching  anatomy. The innominate artery and left carotid arteries are widely  patent. There is a high-grade stenosis of the proximal left subclavian  artery for a distance of approximately 1.5 cm to 2 cm. Selective right  subclavian angiograms demonstrated widely patent subclavian artery with  very minimal stenosis at the origin of the right vertebral artery  certainly there is no high-grade stenosis. I would estimate the  stenosis at approximately 30% to 40%. The left subclavian artery as  stated has a significant occlusion proximally. The injection in the  left subclavian artery fill the right vertebral artery and then there  was retrograde flow into the proximal left vertebral artery, but not all  the way into the subclavian artery. Left vertebral angiogram  demonstrates this to be a small vessel, but it is patent all the way up  to the basilar artery. I suspect, some of the poor filling from the  right was due to flow going in both directions. Due to the poor filling  of the left vertebral artery from the left side, I elected to go ahead  and stent the subclavian artery. Post stent images demonstrated widely  patent subclavian artery with good antegrade flow through the very  small, but patent left vertebral artery.         Gayathri Duncan MD    D: 10/22/2020 16:10:25       T: 10/22/2020 16:20:39     [email or web address]  Job#: [id number]     Doc#: [id number]    CC:  Lola Gates MD

## 2020-10-22 NOTE — PROGRESS NOTES
Vascular    Carotid duplex reviewed- results not in agreement with CTA. Will need Arch/Subclavian/Vertebral angio. NPO after MN- will do tomorrow if can be added to schedule.

## 2020-10-22 NOTE — PROGRESS NOTES
End of shift report given to Zofia Victoria RN at bedside. Patient alert and oriented. Bed in lowest position with wheels locked. Call light within reach. No further needs at this time.

## 2020-10-22 NOTE — PRE SEDATION
History and Physical / Pre-Sedation Assessment    Patient:  Dayne Pedersen  :   1955    Intended Procedure: Bilateral Subclavian/Vertebral angiogram, with poss intervention      HPI: Persistent dizziness and HA, Abnl CTA  Nurses notes reviewed and agreed. Medications reviewed  Allergies:   Codeine; Fenofibrate; and Metformin and related        Physical Exam:   CURRENT VITALS:  BP (!) 166/80   Pulse 83   Temp 98 °F (36.7 °C) (Oral)   Resp 16   Ht 5' 2.5\" (1.588 m)   Wt 223 lb 6 oz (101.3 kg)   SpO2 94%   BMI 40.20 kg/m²   Airway:Normal  Cardiac:Normal  Pulmonary:Normal  Abdomen:Normal        Pre-Procedure Assessment/Plan:  ASA 3 - Patient with moderate systemic disease with functional limitations    Mallampati Airway Assessment:  Mallampati Class III - (soft palate & base of uvula are visible)    Level of Sedation Plan: Moderate sedation    Post Procedure plan: Return to same level of care    I assessed the patient and find that the patient is in satisfactory condition to proceed with the planned procedure and sedation plan. I have explained the risk, benefits, and alternatives to the procedure. The patient understands and agrees to proceed.   Yes    Amari Salamanca

## 2020-10-22 NOTE — PROGRESS NOTES
16   Ht 5' 2.5\" (1.588 m)   Wt 223 lb 6 oz (101.3 kg)   SpO2 96%   BMI 40.20 kg/m²       General appearance: Pleasant female in no apparent distress, appears stated age and cooperative. HEENT:  Normal cephalic, atraumatic without obvious deformity. Pupils equal, round, and reactive to light. Extra ocular muscles intact. Conjunctivae/corneas clear. Neck: Supple, with full range of motion. No jugular venous distention. Trachea midline. Respiratory:  Normal respiratory effort. Clear to auscultation, bilaterally without Rales/Wheezes/Rhonchi. Cardiovascular:  Regular rate and rhythm with normal S1/S2 without murmurs, rubs or gallops. Abdomen: Soft, non-tender, non-distended with normal bowel sounds. Musculoskeletal:  No clubbing, cyanosis or edema bilaterally. Full range of motion without deformity. Skin: Skin color, texture, turgor normal.  No rashes or lesions. Neurologic:  Neurovascularly intact without any focal sensory/motor deficits. Cranial nerves: II-XII intact, grossly non-focal.  Psychiatric:  Alert and oriented, thought content appropriate, normal insight  Capillary Refill: Brisk,< 3 seconds   Peripheral Pulses: +2 palpable    Assessment/Plan:    Active Hospital Problems    Diagnosis    Dizziness [R42]    Parotiditis [K11.20]    [facility]cl[facility] stenosis[facility] ([facility]) [I77.1]    Type 2 diabetes mellitus ([facility] [address] [E11.9]    Mixed hyperlipidemia [E78.2]     Dizziness in setting of vertebrobasilar insufficiency 2/2 severe proximal left subclavian artery stenosis, possible subclavian steal syndrome  - CTA head and neck on 10/5/2020 revealed: Severe stenosis of proximal left subclavian artery with diminished contrast and hypoplastic left vertebral artery likely related to slow flow. Predisposes the patient to subclavian steal syndrome. Otherwise no significant arterial stenosis in her neck. Mild acute uncomplicated right parotiditis.   -Recently seen by Dr. Luis Miguel Velasco in the office; consulted here for

## 2020-10-22 NOTE — PROGRESS NOTES
Consent for procedure signed via pt at this time. Pt alert and oriented, no further questions at this time.

## 2020-10-23 VITALS
DIASTOLIC BLOOD PRESSURE: 85 MMHG | TEMPERATURE: 97.9 F | HEART RATE: 85 BPM | WEIGHT: 221.9 LBS | HEIGHT: 63 IN | RESPIRATION RATE: 18 BRPM | SYSTOLIC BLOOD PRESSURE: 158 MMHG | OXYGEN SATURATION: 95 % | BODY MASS INDEX: 39.32 KG/M2

## 2020-10-23 LAB
ANION GAP SERPL CALCULATED.3IONS-SCNC: 11 MMOL/L (ref 3–16)
BUN BLDV-MCNC: 19 MG/DL (ref 7–20)
CALCIUM SERPL-MCNC: 9.1 MG/DL (ref 8.3–10.6)
CHLORIDE BLD-SCNC: 102 MMOL/L (ref 99–110)
CO2: 23 MMOL/L (ref 21–32)
CREAT SERPL-MCNC: 0.7 MG/DL (ref 0.6–1.2)
GFR AFRICAN AMERICAN: >60
GFR NON-AFRICAN AMERICAN: >60
GLUCOSE BLD-MCNC: 138 MG/DL (ref 70–99)
GLUCOSE BLD-MCNC: 167 MG/DL (ref 70–99)
GLUCOSE BLD-MCNC: 196 MG/DL (ref 70–99)
PERFORMED ON: ABNORMAL
PERFORMED ON: ABNORMAL
POTASSIUM REFLEX MAGNESIUM: 4.3 MMOL/L (ref 3.5–5.1)
SODIUM BLD-SCNC: 136 MMOL/L (ref 136–145)

## 2020-10-23 PROCEDURE — 6370000000 HC RX 637 (ALT 250 FOR IP): Performed by: NURSE PRACTITIONER

## 2020-10-23 PROCEDURE — 6370000000 HC RX 637 (ALT 250 FOR IP): Performed by: SURGERY

## 2020-10-23 PROCEDURE — 80048 BASIC METABOLIC PNL TOTAL CA: CPT

## 2020-10-23 PROCEDURE — 6360000002 HC RX W HCPCS: Performed by: NURSE PRACTITIONER

## 2020-10-23 PROCEDURE — 2580000003 HC RX 258: Performed by: NURSE PRACTITIONER

## 2020-10-23 PROCEDURE — 36415 COLL VENOUS BLD VENIPUNCTURE: CPT

## 2020-10-23 RX ORDER — CLOPIDOGREL BISULFATE 75 MG/1
75 TABLET ORAL DAILY
Qty: 30 TABLET | Refills: 2 | Status: SHIPPED | OUTPATIENT
Start: 2020-10-24 | End: 2021-05-03 | Stop reason: ALTCHOICE

## 2020-10-23 RX ADMIN — ASPIRIN 81 MG: 81 TABLET, CHEWABLE ORAL at 09:00

## 2020-10-23 RX ADMIN — ENOXAPARIN SODIUM 40 MG: 40 INJECTION SUBCUTANEOUS at 09:00

## 2020-10-23 RX ADMIN — FLUTICASONE PROPIONATE 1 SPRAY: 50 SPRAY, METERED NASAL at 09:07

## 2020-10-23 RX ADMIN — CETIRIZINE HYDROCHLORIDE 10 MG: 10 TABLET, FILM COATED ORAL at 09:00

## 2020-10-23 RX ADMIN — INSULIN LISPRO 1 UNITS: 100 INJECTION, SOLUTION INTRAVENOUS; SUBCUTANEOUS at 12:39

## 2020-10-23 RX ADMIN — CLOPIDOGREL BISULFATE 75 MG: 75 TABLET ORAL at 09:00

## 2020-10-23 RX ADMIN — LISINOPRIL 5 MG: 5 TABLET ORAL at 09:00

## 2020-10-23 RX ADMIN — Medication 10 ML: at 09:00

## 2020-10-23 ASSESSMENT — PAIN SCALES - GENERAL
PAINLEVEL_OUTOF10: 0
PAINLEVEL_OUTOF10: 0

## 2020-10-23 NOTE — DISCHARGE SUMMARY
Hospital Medicine Discharge Summary    Patient: Kristy Younger     Age: 63 y.o. Gender: female  : 1955   MRN: [id number]  Code Status: Full     Admit Date: 10/20/2020   Discharge Date: 10/23/2020    Disposition:  Home     Condition at Discharge: Stable    Primary Care Provider: Dalila Schafer MD    Admitting Physician: Laura Hernandez MD  Discharge Physician: Renato Rodriguez MD       Discharge Diagnoses: Active Hospital Problems    Diagnosis    Dizziness [R42]    Parotiditis [K11.20]    Subclavian artery stenosis, left (HCC) [I77.1]    Type 2 diabetes mellitus ([facility] [address] [E11.9]    Mixed hyperlipidemia [E78.2]       Hospital Course:     63 y.o. female, with PMH of HTN, HLD, DM2, and subclavian artery stenosis, who was a direct admission to [facility] from [facility] with dizziness and headache. History was obtained from the patient and review of the EMR. The patient presents today with dizziness and headache that has been persistent over the past 2 weeks. She states that she overall just feels off balance and this worsens when she turns her head in certain directions. She came to the ED for evaluation on 10/5/2020 and was noted to have severe left subclavian artery stenosis and right parotiditis. She was given urgent referral to see vascular surgery. She was given referral to see vascular surgery for this. On 10/9/2020, the patient followed up with Dr. Bashir Jalloh, vascular surgeon. During her visit, the patient only reported some occasional numbness and denied having any lightheadedness or dizziness. It was decided at that time that she would not have any arterial intervention unless her symptoms were to worsen. The patient states that while just laying in bed, she still feels dizzy. She does admit that movement of her left arm does seem to exacerbate her symptoms even more, causing her head to throb and the dizziness to worsen.     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 Predisposes the patient to subclavian steal syndrome. Otherwise no significant arterial stenosis in her neck. Mild acute uncomplicated right parotiditis. -Recently seen by Dr. Melanie Oseguera in the office; consulted here for input.    - s/p angiogram with left subclavian stent  - Continue ASA + Plavix for at least 3 months     Parotiditis on previous CTA, stable. Follows with ENT for this outpatient.   - Note from 10/15/2020 --> continue conservative management with warm compresses, sialagogues, significant water intake    Essential HTN, not well controlled. - Continue home lisinopril    Hx of HLD, controlled with statin. - Continue home crestor  - Follow-up with PCP for med adjustments    DM2, uncontrolled. - Resume home regimen    Morbid Obesity -  With BMI ~40.5. Complicating assessment and treatment. Placing patient at risk for multiple co-morbidities as well as early death and contributing to the patient's presentation. Counseled on weight loss        Exam:   BP (!) 158/85   Pulse 85   Temp 97.9 °F (36.6 °C) (Oral)   Resp 18   Ht 5' 2.5\" (1.588 m)   Wt 221 lb 14.4 oz (100.7 kg)   SpO2 95%   BMI 39.94 kg/m²   General appearance: Pleasant female in no apparent distress, appears stated age and cooperative. HEENT:  Normal cephalic, atraumatic without obvious deformity. Pupils equal, round, and reactive to light. Extra ocular muscles intact. Conjunctivae/corneas clear. Neck: Supple, with full range of motion. No jugular venous distention. Trachea midline. Respiratory:  Normal respiratory effort. Clear to auscultation, bilaterally without Rales/Wheezes/Rhonchi.   Cardiovascular:  Regular rate and rhythm with normal S1/S2 without murmurs, rubs or gallops. Abdomen: Soft, non-tender, non-distended with normal bowel sounds. Musculoskeletal:  No clubbing, cyanosis or edema bilaterally. Full range of motion without deformity. Skin: Skin color, texture, turgor normal.  No rashes or lesions. Neurologic:  Neurovascularly intact without any focal sensory/motor deficits. Cranial nerves: II-XII intact, grossly non-focal.  Psychiatric:  Alert and oriented, thought content appropriate, normal insight  Capillary Refill: Brisk,< 3 seconds   Peripheral Pulses: +2 palpable      Patient Discharge Instructions:    Follow-up with PCP, Vascular Surgery    Discharge Medications:   Discharge Medication List as of 10/23/2020  1:29 PM      START taking these medications    Details   clopidogrel (PLAVIX) 75 MG tablet Take 1 tablet by mouth daily, Disp-30 tablet,R-2Normal           Discharge Medication List as of 10/23/2020  1:29 PM        Discharge Medication List as of 10/23/2020  1:29 PM      CONTINUE these medications which have NOT CHANGED    Details   empagliflozin (JARDIANCE) 25 MG tablet Take 1 tablet by mouth daily, Disp-30 tablet,R-5Normal      rosuvastatin (CRESTOR) 5 MG tablet TAKE ONE TABLET BY MOUTH ONCE NIGHTLY, Disp-30 tablet, R-9Normal      glimepiride (AMARYL) 4 MG tablet TAKE ONE TABLET BY MOUTH TWICE A DAY, Disp-60 tablet, R-5Normal      lisinopril (PRINIVIL;ZESTRIL) 5 MG tablet Take 1 tablet by mouth daily, Disp-30 tablet, R-11Normal      vitamin D-3 (CHOLECALCIFEROL) 5000 units TABS Take 1 tablet by mouth daily, Disp-30 tabletOTC      Omega-3 Fatty Acids (OMEGA 3 PO) Take by mouthHistorical Med      fluticasone (FLONASE) 50 MCG/ACT nasal spray In each nostril daily, Disp-1 Bottle, R-11      cetirizine (ZYRTEC ALLERGY) 10 MG tablet Take 1 tablet by mouth daily, Disp-30 tablet, R-11      calcium carbonate (OSCAL) 500 MG TABS tablet Take 2 tablets by mouth daily, Disp-60 tablet, R-11      aspirin 81 MG tablet Take 162 mg by mouth daily Historical Med           Discharge Medication List as of 10/23/2020  1:29 PM            Significant Test Results    Ct Head Wo Contrast    Result Date: 10/20/2020  EXAMINATION: CT OF THE HEAD WITHOUT CONTRAST  10/20/2020 2:37 pm TECHNIQUE: CT of the head was performed without the administration of intravenous contrast. Dose modulation, iterative reconstruction, and/or weight based adjustment of the mA/kV was utilized to reduce the radiation dose to as low as reasonably achievable. COMPARISON: None. HISTORY: ORDERING SYSTEM PROVIDED HISTORY: dizziness TECHNOLOGIST PROVIDED HISTORY: Reason for exam:->dizziness Has a \"code stroke\" or \"stroke alert\" been called? ->No Reason for Exam: Pt c/o feeling dizzy and having a bad headache Acuity: Unknown Type of Exam: Unknown FINDINGS: BRAIN/VENTRICLES: There is no acute intracerebral hemorrhage or extra-axial fluid collection. There is mild periventricular, subcortical and deep white matter small vessel ischemic disease. ORBITS: The orbits are unremarkable. SINUSES: The visualized paranasal sinuses and mastoid air cells are clear. SOFT TISSUES/SKULL:  The calvarium is intact. 1. No acute intracranial abnormality.      Vl Dup Carotid Bilateral    Result Date: 10/22/2020  Carotid Duplex Study  Demographics   Patient Name       [facility]   Date of Study      10/21/2020         Gender              Female   Patient Number     [id number]         Date of Birth       1955   Visit Number       [id number]          Age                 63 year(s)   Accession Number   [id number]         Room Number         [facility]   Corporate ID       [id number]             Sonographer         Unique Dickinson BS, RVT   Ordering Physician Laura Fay MD        Physician           Vascular ------------------------------------------------------------------  Blood Pressure:Right arm 148/ mmHg. Left arm 130/ mmHg. Patient Status:Routine. Study Elaine Ruiz 46 - Vascular Lab. Technical Quality:Adequate visualization. Risk Factors   - The patient's risk factor(s) include: obesity and arterial hypertension. Plaque   - A plaque was found in the Right Prox ICA. The plaque characteristics are: medium echogenicity, minimal severity and heterogeneous texture. - A plaque was found in the Left Prox ICA. The plaque characteristics are: medium echogenicity, minimal severity and heterogeneous texture. Velocities are measured in cm/s ; Diameters are measured in mm Carotid Right Measurements +---------------+----+----+-----+----+ ! Location       ! PSV ! EDV ! Angle! RI  ! +---------------+----+----+-----+----+ ! Prox CCA       !101 !10. 9!56   !0.89! +---------------+----+----+-----+----+ ! Mid CCA        !77.8!12. 8!56   !0.84! +---------------+----+----+-----+----+ ! Dist CCA       !98. 5!17  !60   !0.83! +---------------+----+----+-----+----+ ! Prox ICA       !66. 6!17  !60   !0.74! +---------------+----+----+-----+----+ ! Mid ICA        !94.4!22. 4!60   !0.76! +---------------+----+----+-----+----+ ! Dist ICA       !114 !31. 3!60   !0.73! +---------------+----+----+-----+----+ ! Prox ECA       !96. 5!10. 2! 60   !0.89! +---------------+----+----+-----+----+ ! Vertebral      !73. 4!15. 6!60   !0.79! +---------------+----+----+-----+----+ ! Prox Subclavian! 147 !0   !44   !1   ! +---------------+----+----+-----+----+   - There is antegrade vertebral flow noted on the right side. - Additional Measurements:ICAPSV/CCAPSV 1.47. ICAEDV/CCAEDV 2.87. Carotid Left Measurements +---------------+----+----+-----+----+ ! Location       ! PSV ! EDV ! Angle! RI  ! +---------------+----+----+-----+----+ ! Prox CCA       !98.6!11. 6!30   !0.88! +---------------+----+----+-----+----+ ! Mid CCA        !96. 5!15. 3!60   !0.84! +---------------+----+----+-----+----+ ! Dist CCA       !86.6!12. 3!60   !0.86! +---------------+----+----+-----+----+ ! Prox ICA       !79. 7!20. 7! 60   !0.74! +---------------+----+----+-----+----+ ! Mid ICA        !128 !35. 2! 60   !0.72! +---------------+----+----+-----+----+ ! Dist ICA       !88. 6!20. 9!54   !0.76! +---------------+----+----+-----+----+ ! Prox ECA       !103 !9.4 ! 58   !0.91! +---------------+----+----+-----+----+ ! Prox Vertebral !0   !    !     !    ! +---------------+----+----+-----+----+ ! Prox Subclavian! 120 !    !     !    ! +---------------+----+----+-----+----+   - There is absent vertebral flow noted on the left side. - Additional Measurements:ICAPSV/CCAPSV 1.33. ICAEDV/CCAEDV 3.03. Consults:     IP CONSULT TO VASCULAR SURGERY    Labs: For convenience and continuity at follow-up the following most recent labs are provided:    Lab Results   Component Value Date    WBC 4.9 10/21/2020    HGB 15.0 10/21/2020    HCT 45.1 10/21/2020    MCV 83.8 10/21/2020     10/21/2020     10/23/2020    K 4.3 10/23/2020     10/23/2020    CO2 23 10/23/2020    BUN 19 10/23/2020    CREATININE 0.7 10/23/2020    CALCIUM 9.1 10/23/2020    TROPONINI <0.01 10/20/2020    ALKPHOS 59 10/21/2020    ALT 20 10/21/2020    AST 21 10/21/2020    BILITOT 0.3 10/21/2020    BILIDIR <0.2 02/19/2019    LABALBU 4.5 10/21/2020    LDLCALC see below 02/25/2020    TRIG 321 02/25/2020    LABA1C 7.0 08/17/2020     Lab Results   Component Value Date    INR 0.92 10/20/2020         The patient was seen and examined on day of discharge and this discharge summary is in conjunction with any daily progress note from day of discharge. Time spent on discharge is more than 30 minutes in the examination, evaluation, counseling and review of medications and discharge plan. Signed:    Lamont Arizmendi MD   11/15/2020    Thank you Lenard Diaz MD for the opportunity to be involved in this patient's care.  If you have

## 2020-10-23 NOTE — CARE COORDINATION
Discharge order noted. Per chart review and discussion with RN, patient is independent at home and has no needs from CM.

## 2020-10-23 NOTE — PROGRESS NOTES
Vascular    S/P Angiogram with Left Subclavian stenting. Patient reports left hand feels warmer, continues to have some dizziness. R Groin access site without bleed or hematoma  Palpable left radial pulse. Patient should continue 81mg ASA and 75mg Plavix for at least next 3 months. If dizziness persists will need additional w/u as previously suggested by ENT.

## 2020-11-03 PROBLEM — I10 BENIGN ESSENTIAL HTN: Status: RESOLVED | Noted: 2020-11-03 | Resolved: 2020-11-03

## 2020-11-13 RX ORDER — LISINOPRIL 5 MG/1
TABLET ORAL
Qty: 30 TABLET | Refills: 10 | Status: SHIPPED | OUTPATIENT
Start: 2020-11-13 | End: 2021-11-02 | Stop reason: SDUPTHER

## 2020-12-11 NOTE — PATIENT INSTRUCTIONS
"Family Medicine Telephone Visit Note               Telephone Visit Consent   Patient was verbally read the following and verbal consent was obtained.    \"Telephone visits are billed at different rates depending on your insurance coverage. During this emergency period, for some insurers they may be billed the same as an in-person visit.  Please reach out to your insurance provider with any questions.  If during the course of the call the physician/provider feels a telephone visit is not appropriate, you will not be charged for this service.\"    Name person giving consent:  Patient   Date verbal consent given:  12/11/2020  Time verbal consent given:  10:08 AM           Chief Complaint   Patient presents with     RECHECK     Followup. Weight and Behavior              Due to patient being non-English speaking/uses sign language, an  was used for this visit. Only for face-to-face interpretation by an external agency, date and length of interpretation can be found on the scanned worksheet.     name: Bashir Pérez  Agency: [employer]  Language: Lizette   Telephone number: [phone]  Type of interpretation: Face-to-face, spoken         HPI   Patients name: Roland  Appointment start time:  10:00AM    Roland Watkins is a 18 year old female with a history of obesity who presents for a follow up meeting regarding weight loss. Roland Watkins's BMI is >99% and has been seeing Dr. Rai. She has been tolerating her metformin dosage with no complaints of nausea,vomitting, or dizziness. She reports to be 230 lb yesterday, which is a 9 lb loss from November. Per Dr. Rai's advice, she has begun a food journal and has been actively recording her intake. She has found mindful eating to be not effective in reducing food intake but has been limiting her portion size. She also has been eating more colorful vegetable and fruits, per Dr. Rai's suggestion. She has been drinking mostly water and occasional fruit juice. Roland Watkins " Patient should call the office immediately with new or ongoing signs or symptoms or worsening, or proceed to the emergency room. If you are on medications which could impair your senses, you are at risk of weakness, falls, dizziness, or drowsiness. You should be careful during activities which could place you at risk of harm, such as climbing, using stairs, operating machinery, or driving vehicles. If you feel you cannot safely do these activities, you should request others to help you, or avoid the activities altogether. If you are drowsy for any other reason, you should use the same precautions as listed above. "participates in daily virtual wrestling, which consists of conditioning and weight training. Her mood has been good and denies any depression. Her energy level has increased as she is not taking afternoon naps as often as before. Roland Watkins's goal is to be 190lb by her [birth year].      Current Outpatient Medications   Medication Sig Dispense Refill     metFORMIN (GLUCOPHAGE-XR) 500 MG 24 hr tablet Take 1 tablet (500 mg) by mouth daily (with dinner) for 7 days, THEN 2 tablets (1,000 mg) daily (with dinner) for 7 days, THEN 3 tablets (1,500 mg) daily (with dinner) for 7 days, THEN 4 tablets (2,000 mg) daily (with dinner) for 7 days. 70 tablet 0     Allergies   Allergen Reactions     Nka [No Known Allergies]               Review of Systems:     Constitutional, HEENT, cardiovascular, pulmonary, gi and gu systems are negative, except as otherwise noted.         Physical Exam:     There were no vitals taken for this visit.  Estimated body mass index is 42.41 kg/m  as calculated from the following:    Height as of 11/16/20: 1.6 m (5' 3\").    Weight as of 11/16/20: 108.6 kg (239 lb 6.4 oz).    Exam:  Constitutional: healthy, alert and no distress  Psychiatric: mentation appears normal and affect normal/bright     Results from the last 24 hoursNo results found for this or any previous visit (from the past 24 hour(s)).        Assessment and Plan       ICD-10-CM    1. Body mass index, pediatric, greater than 99th percentile for age  Z68.54    Today I discussed her goals and praised her on her accomplishments such as enjoying her new diet that consists of smaller portion of carbohydrates and introduction of more fruits and vegetables. She has also began a food journal and is actively involved in wrestling at her school.    Refilled medications that would be required in the next 3 months.     After Visit Information:  Will print and mail AVS     Return in about 4 weeks (around 1/8/2021).    Appointment end time: 10:20AM  This " is a telephone visit that took 20 minutes.      Clinician location:  [facility]     Jl Lisette Zambrano, MS3    I was present with the medical student who participated in the service and in the documentation of this note. I have verified the history and personally performed the physical exam and medical decision making, and have verified the content of the note, which accurately reflects my assessment of the patient and the plan of care.    Heidi Adam MD PGY3     I precepted today with Dr. Lambert.

## 2020-12-24 ENCOUNTER — OFFICE VISIT (OUTPATIENT)
Dept: PRIMARY CARE CLINIC | Age: 65
End: 2020-12-24
Payer: COMMERCIAL

## 2020-12-24 PROCEDURE — 99211 OFF/OP EST MAY X REQ PHY/QHP: CPT | Performed by: NURSE PRACTITIONER

## 2020-12-24 NOTE — PROGRESS NOTES
Marie Garcia received a viral test for COVID-19. They were educated on isolation and quarantine as appropriate. For any symptoms, they were directed to seek care from their PCP, given contact information to establish with a doctor, directed to an urgent care or the emergency room.

## 2020-12-24 NOTE — PATIENT INSTRUCTIONS

## 2020-12-25 LAB — SARS-COV-2, NAA: NOT DETECTED

## 2021-01-15 RX ORDER — GLIMEPIRIDE 4 MG/1
TABLET ORAL
Qty: 60 TABLET | Refills: 5 | Status: SHIPPED | OUTPATIENT
Start: 2021-01-15 | End: 2021-12-15

## 2021-01-18 ENCOUNTER — TELEPHONE (OUTPATIENT)
Dept: VASCULAR SURGERY | Age: 66
End: 2021-01-18

## 2021-01-18 NOTE — TELEPHONE ENCOUNTER
----- Message from Laverne Harvey MD sent at 1/14/2021  5:19 PM EST -----  If she doing ok she does not need to see me. Plavix x 3 months then ASA only  ----- Message -----  From: Robson Finch MA  Sent: 1/14/2021  10:55 AM EST  To: Laverne Harvey MD    Patient never got to come in sp angioplasty due to be around someone with Covid. Want to know if need to see her and if needs to continue Plavix. Note says take for 3 months. She had subclavian angioplasty in October 20. Please advise.  Tanmay garcia     Called patient back regarding Plavix as per Dr Lakia Miramontes just needs to be on this for 3 months total and then go to Aspirin 81 mg. radha

## 2021-03-11 ENCOUNTER — TELEPHONE (OUTPATIENT)
Dept: FAMILY MEDICINE CLINIC | Age: 66
End: 2021-03-11

## 2021-04-27 ENCOUNTER — NURSE ONLY (OUTPATIENT)
Dept: FAMILY MEDICINE CLINIC | Age: 66
End: 2021-04-27
Payer: COMMERCIAL

## 2021-04-27 DIAGNOSIS — E78.2 MIXED HYPERLIPIDEMIA: ICD-10-CM

## 2021-04-27 DIAGNOSIS — I10 BENIGN ESSENTIAL HTN: ICD-10-CM

## 2021-04-27 DIAGNOSIS — E55.9 VITAMIN D DEFICIENCY: ICD-10-CM

## 2021-04-27 PROCEDURE — 36415 COLL VENOUS BLD VENIPUNCTURE: CPT | Performed by: FAMILY MEDICINE

## 2021-04-28 LAB
A/G RATIO: 1.7 (ref 1.1–2.2)
ALBUMIN SERPL-MCNC: 4.8 G/DL (ref 3.4–5)
ALP BLD-CCNC: 77 U/L (ref 40–129)
ALT SERPL-CCNC: 21 U/L (ref 10–40)
ANION GAP SERPL CALCULATED.3IONS-SCNC: 14 MMOL/L (ref 3–16)
AST SERPL-CCNC: 17 U/L (ref 15–37)
BILIRUB SERPL-MCNC: 0.3 MG/DL (ref 0–1)
BUN BLDV-MCNC: 20 MG/DL (ref 7–20)
CALCIUM SERPL-MCNC: 9.8 MG/DL (ref 8.3–10.6)
CHLORIDE BLD-SCNC: 100 MMOL/L (ref 99–110)
CHOLESTEROL, TOTAL: 190 MG/DL (ref 0–199)
CO2: 26 MMOL/L (ref 21–32)
CREAT SERPL-MCNC: 0.9 MG/DL (ref 0.6–1.2)
GFR AFRICAN AMERICAN: >60
GFR NON-AFRICAN AMERICAN: >60
GLOBULIN: 2.9 G/DL
GLUCOSE BLD-MCNC: 130 MG/DL (ref 70–99)
HDLC SERPL-MCNC: 61 MG/DL (ref 40–60)
LDL CHOLESTEROL CALCULATED: 90 MG/DL
POTASSIUM SERPL-SCNC: 4.9 MMOL/L (ref 3.5–5.1)
SODIUM BLD-SCNC: 140 MMOL/L (ref 136–145)
TOTAL PROTEIN: 7.7 G/DL (ref 6.4–8.2)
TRIGL SERPL-MCNC: 195 MG/DL (ref 0–150)
VITAMIN D 25-HYDROXY: 57.1 NG/ML
VLDLC SERPL CALC-MCNC: 39 MG/DL

## 2021-05-03 ENCOUNTER — OFFICE VISIT (OUTPATIENT)
Dept: FAMILY MEDICINE CLINIC | Age: 66
End: 2021-05-03
Payer: COMMERCIAL

## 2021-05-03 VITALS
BODY MASS INDEX: 42.14 KG/M2 | SYSTOLIC BLOOD PRESSURE: 118 MMHG | HEIGHT: 62 IN | DIASTOLIC BLOOD PRESSURE: 68 MMHG | WEIGHT: 229 LBS | OXYGEN SATURATION: 98 % | HEART RATE: 77 BPM

## 2021-05-03 DIAGNOSIS — I65.01 STENOSIS OF RIGHT VERTEBRAL ARTERY: ICD-10-CM

## 2021-05-03 DIAGNOSIS — F32.9 REACTIVE DEPRESSION: ICD-10-CM

## 2021-05-03 DIAGNOSIS — I77.1 SUBCLAVIAN ARTERY STENOSIS, LEFT (HCC): ICD-10-CM

## 2021-05-03 DIAGNOSIS — R42 DIZZINESS: ICD-10-CM

## 2021-05-03 DIAGNOSIS — E78.2 MIXED HYPERLIPIDEMIA: ICD-10-CM

## 2021-05-03 DIAGNOSIS — Z12.31 ENCOUNTER FOR SCREENING MAMMOGRAM FOR MALIGNANT NEOPLASM OF BREAST: ICD-10-CM

## 2021-05-03 DIAGNOSIS — K59.01 SLOW TRANSIT CONSTIPATION: ICD-10-CM

## 2021-05-03 DIAGNOSIS — E55.9 VITAMIN D DEFICIENCY: ICD-10-CM

## 2021-05-03 DIAGNOSIS — Z12.11 SCREEN FOR COLON CANCER: ICD-10-CM

## 2021-05-03 DIAGNOSIS — E11.9 TYPE 2 DIABETES MELLITUS WITHOUT COMPLICATION, WITHOUT LONG-TERM CURRENT USE OF INSULIN (HCC): Primary | ICD-10-CM

## 2021-05-03 PROCEDURE — 99214 OFFICE O/P EST MOD 30 MIN: CPT | Performed by: FAMILY MEDICINE

## 2021-05-03 PROCEDURE — 36415 COLL VENOUS BLD VENIPUNCTURE: CPT | Performed by: FAMILY MEDICINE

## 2021-05-03 NOTE — PROGRESS NOTES
Chief Complaint   Patient presents with    Hypertension    Hyperlipidemia    Diabetes    Other     pt does not check bp at home. pt can feel if her bp is high and feels it has been well and takes her medicine every day. pt is not fasting and had labs drawn last week. pt takes her cholesterol medication every day. pt does check sugar at home and states it runs well and takese her medication as rx. pt states her dizziness or balance issues has not happened much anymore since she had a stent placed. pt states her headaches have subsided since the stent as well. Wt Readings from Last 3 Encounters:   21 229 lb (103.9 kg)   10/23/20 221 lb 14.4 oz (100.7 kg)   10/20/20 225 lb (102.1 kg)     BP Readings from Last 3 Encounters:   21 118/68   10/23/20 (!) 158/85   10/20/20 (!) 162/73      Lab Results   Component Value Date    LABA1C 7.0 2020    LABA1C 7.0 2020    LABA1C 7.2 2019       HPI:  Mega Beck is a 61 y.o. (: 1955) here today for    Diabetes : bg was 119 today after lunch but she continues to gain weight. Appears that an A1C was no collected in April when she had blood drawn. Will draw A1C today    Patient had a subclavian stent placed due to stenosis and since having it she has not had anymore headaches or dizziness. She also was informed by Dr. Marlo Flores that she could stop taking the plavix after 3 months of being on it and then could go to taking 81mg aspirin on a daily basis. Patient is due for mammogram and colonoscopy she has both of these ordered and she plans to go forward with it. Discussed with patient the covid vaccine. She is unsure if she wants the vaccine at this time. Has some issues with some abdominal tenderness after eating. She states that she does have some issues with belching and gas. Will take an antiacid or antigas medicaton and it helps. She does have issues with constipation she takes miralax to help keep her regular.      [x] Patient has completed an advance directive  [] Patient has NOT completed an advanced directive  [x] Patient has a documented healthcare surrogate  [] Patient does NOT have a documented healthcare surrogate  [] Discussed the importance of establishing and updating an advanced directive. Patient has questions at this time and those were answered. [x] Discussed the importance of establishing and updating an advanced directive. Patient does NOT have questions at this time. Discussed with: [x] Patient            [] Family             [] Other caregiver    Patient's medications, allergies, past medical, surgical, social and family histories were reviewed and updated asappropriate on 5/3/2021 at 2:03 PM.    ROS:  Review of Systems    All other systems reviewed and are negative except as noted above on 5/3/2021 at 2:03 PM. Additional review of systems may be scanned into the media section ofthis medical record. Any responses requiring further intervention were pursued.     Past Medical History:   Diagnosis Date    Allergic rhinitis     Dizziness 10/20/2020    Hearing loss     HTN (hypertension)     Hyperlipidemia     No history of procedure 2/5/16    colonoscopy    Type II or unspecified type diabetes mellitus without mention of complication, not stated as uncontrolled      Family History   Problem Relation Age of Onset    Diabetes Mother     Kidney Disease Mother     Heart Disease Mother     Heart Disease Father     High Blood Pressure Father      Social History     Socioeconomic History    Marital status:      Spouse name: Not on file    Number of children: Not on file    Years of education: Not on file    Highest education level: Not on file   Occupational History    Not on file   Social Needs    Financial resource strain: Not on file    Food insecurity     Worry: Not on file     Inability: Not on file    Transportation needs     Medical: Not on file     Non-medical: Not on file   Tobacco Use    Smoking status: Former Smoker     Packs/day: 1.00     Years: 8.00     Pack years: 8.00     Types: Cigarettes     Quit date: 1983     Years since quittin.8    Smokeless tobacco: Never Used    Tobacco comment: pt has passive smoke exposure - spouse smokes in the house   Substance and Sexual Activity    Alcohol use: No     Alcohol/week: 0.0 standard drinks    Drug use: No    Sexual activity: Yes     Partners: Male   Lifestyle    Physical activity     Days per week: Not on file     Minutes per session: Not on file    Stress: Not on file   Relationships    Social connections     Talks on phone: Not on file     Gets together: Not on file     Attends [employer] service: Not on file     Active member of club or organization: Not on file     Attends meetings of clubs or organizations: Not on file     Relationship status: Not on file    Intimate partner violence     Fear of current or ex partner: Not on file     Emotionally abused: Not on file     Physically abused: Not on file     Forced sexual activity: Not on file   Other Topics Concern    Not on file   Social History Narrative    Not on file     Prior to Visit Medications    Medication Sig Taking?  Authorizing Provider   JARDIANCE 25 MG tablet TAKE ONE TABLET BY MOUTH DAILY Yes Alycia Davis MD   glimepiride (AMARYL) 4 MG tablet TAKE ONE TABLET BY MOUTH TWICE A DAY Yes Alycia Davis MD   lisinopril (PRINIVIL;ZESTRIL) 5 MG tablet TAKE ONE TABLET BY MOUTH DAILY Yes Alycia Davis MD   rosuvastatin (CRESTOR) 5 MG tablet TAKE ONE TABLET BY MOUTH ONCE NIGHTLY Yes Alycia Davis MD   vitamin D-3 (CHOLECALCIFEROL) 5000 units TABS Take 1 tablet by mouth daily  Patient taking differently: Take 5,000 Units by mouth daily 2-3 times a week Yes Alycia Davis MD   Omega-3 Fatty Acids (OMEGA 3 PO) Take by mouth Yes Historical Provider, MD   fluticasone (FLONASE) 50 MCG/ACT nasal spray In each nostril Right lower leg: No edema. Left lower leg: No edema. Skin:     General: Skin is warm and dry. Coloration: Skin is not pale. Findings: No erythema or rash. Comments: Turgor normal   Neurological:      Mental Status: She is oriented to person, place, and time. Psychiatric:         Mood and Affect: Mood normal.         Behavior: Behavior normal.         Thought Content: Thought content normal.         Judgment: Judgment normal.         Lab Review   Nurse Only on 04/27/2021   Component Date Value    Vit D, 25-Hydroxy 04/27/2021 57.1     Sodium 04/27/2021 140     Potassium 04/27/2021 4.9     Chloride 04/27/2021 100     CO2 04/27/2021 26     Anion Gap 04/27/2021 14     Glucose 04/27/2021 130*    BUN 04/27/2021 20     CREATININE 04/27/2021 0.9     GFR Non- 04/27/2021 >60     GFR  04/27/2021 >60     Calcium 04/27/2021 9.8     Total Protein 04/27/2021 7.7     Albumin 04/27/2021 4.8     Albumin/Globulin Ratio 04/27/2021 1.7     Total Bilirubin 04/27/2021 0.3     Alkaline Phosphatase 04/27/2021 77     ALT 04/27/2021 21     AST 04/27/2021 17     Globulin 04/27/2021 2.9     Cholesterol, Total 04/27/2021 190     Triglycerides 04/27/2021 195*    HDL 04/27/2021 61*    LDL Calculated 04/27/2021 90     VLDL Cholesterol Calcula* 04/27/2021 39               ASSESSMENT PLAN      Diagnosis Orders   1. Type 2 diabetes mellitus without complication, without long-term current use of insulin (HCC)  HEMOGLOBIN A1C   2. Encounter for screening mammogram for malignant neoplasm of breast  ELROY LITA DIGITAL SCREEN BILATERAL   3. Screen for colon cancer  Christy Wilson MD, Gastroenterology, [facility]   4. Reactive depression     5. Mixed hyperlipidemia     6. Subclavian artery stenosis, left (HCC)     7. Stenosis of right vertebral artery     8. Dizziness     9. Vitamin D deficiency     10. Slow transit constipation     No symptoms of elevated sugar.   We

## 2021-05-04 LAB
ESTIMATED AVERAGE GLUCOSE: 162.8 MG/DL
HBA1C MFR BLD: 7.3 %

## 2021-06-09 ENCOUNTER — TELEPHONE (OUTPATIENT)
Dept: FAMILY MEDICINE CLINIC | Age: 66
End: 2021-06-09

## 2021-07-12 RX ORDER — ROSUVASTATIN CALCIUM 5 MG/1
TABLET, COATED ORAL
Qty: 30 TABLET | Refills: 8 | Status: SHIPPED | OUTPATIENT
Start: 2021-07-12 | End: 2022-07-18

## 2021-07-20 ENCOUNTER — HOSPITAL ENCOUNTER (OUTPATIENT)
Dept: MAMMOGRAPHY | Age: 66
Discharge: HOME | End: 2021-07-25
Payer: COMMERCIAL

## 2021-07-20 VITALS — BODY MASS INDEX: 41.22 KG/M2 | HEIGHT: 62 IN | WEIGHT: 224 LBS

## 2021-07-20 DIAGNOSIS — Z12.31 VISIT FOR SCREENING MAMMOGRAM: ICD-10-CM

## 2021-07-20 PROCEDURE — 77067 SCR MAMMO BI INCL CAD: CPT

## 2021-08-27 ENCOUNTER — TELEPHONE (OUTPATIENT)
Dept: ADMINISTRATIVE | Age: 66
End: 2021-08-27

## 2021-08-27 NOTE — TELEPHONE ENCOUNTER
Submitted PA for Jardiance 25MG tablets, Key: [id number], Medication has been APPROVED through 08/27/2022. Please notify patient. Thank you.

## 2021-11-02 ENCOUNTER — OFFICE VISIT (OUTPATIENT)
Dept: FAMILY MEDICINE CLINIC | Age: 66
End: 2021-11-02
Payer: COMMERCIAL

## 2021-11-02 VITALS
WEIGHT: 231 LBS | HEART RATE: 84 BPM | OXYGEN SATURATION: 98 % | SYSTOLIC BLOOD PRESSURE: 144 MMHG | DIASTOLIC BLOOD PRESSURE: 90 MMHG | TEMPERATURE: 97 F | BODY MASS INDEX: 42.25 KG/M2

## 2021-11-02 DIAGNOSIS — E55.9 VITAMIN D DEFICIENCY: ICD-10-CM

## 2021-11-02 DIAGNOSIS — E11.9 TYPE 2 DIABETES MELLITUS WITHOUT COMPLICATION, WITHOUT LONG-TERM CURRENT USE OF INSULIN (HCC): Primary | ICD-10-CM

## 2021-11-02 DIAGNOSIS — K59.01 SLOW TRANSIT CONSTIPATION: ICD-10-CM

## 2021-11-02 DIAGNOSIS — I10 ELEVATED BLOOD PRESSURE READING IN OFFICE WITH DIAGNOSIS OF HYPERTENSION: ICD-10-CM

## 2021-11-02 DIAGNOSIS — I10 BENIGN ESSENTIAL HTN: ICD-10-CM

## 2021-11-02 DIAGNOSIS — I65.01 STENOSIS OF RIGHT VERTEBRAL ARTERY: ICD-10-CM

## 2021-11-02 DIAGNOSIS — Z12.11 SCREEN FOR COLON CANCER: ICD-10-CM

## 2021-11-02 DIAGNOSIS — E78.2 MIXED HYPERLIPIDEMIA: ICD-10-CM

## 2021-11-02 DIAGNOSIS — I77.1 SUBCLAVIAN ARTERY STENOSIS, LEFT (HCC): ICD-10-CM

## 2021-11-02 PROCEDURE — 99214 OFFICE O/P EST MOD 30 MIN: CPT | Performed by: FAMILY MEDICINE

## 2021-11-02 PROCEDURE — 36415 COLL VENOUS BLD VENIPUNCTURE: CPT | Performed by: FAMILY MEDICINE

## 2021-11-02 PROCEDURE — 3051F HG A1C>EQUAL 7.0%<8.0%: CPT | Performed by: FAMILY MEDICINE

## 2021-11-02 RX ORDER — LISINOPRIL 10 MG/1
10 TABLET ORAL DAILY
Qty: 90 TABLET | Refills: 3 | Status: SHIPPED | OUTPATIENT
Start: 2021-11-02

## 2021-11-02 SDOH — ECONOMIC STABILITY: FOOD INSECURITY: WITHIN THE PAST 12 MONTHS, YOU WORRIED THAT YOUR FOOD WOULD RUN OUT BEFORE YOU GOT MONEY TO BUY MORE.: NEVER TRUE

## 2021-11-02 SDOH — ECONOMIC STABILITY: FOOD INSECURITY: WITHIN THE PAST 12 MONTHS, THE FOOD YOU BOUGHT JUST DIDN'T LAST AND YOU DIDN'T HAVE MONEY TO GET MORE.: NEVER TRUE

## 2021-11-02 ASSESSMENT — SOCIAL DETERMINANTS OF HEALTH (SDOH): HOW HARD IS IT FOR YOU TO PAY FOR THE VERY BASICS LIKE FOOD, HOUSING, MEDICAL CARE, AND HEATING?: NOT HARD AT ALL

## 2021-11-02 NOTE — PROGRESS NOTES
Chief Complaint   Patient presents with    Diabetes    Hypertension        Internal Administration   First Dose      Second Dose           Last COVID Lab SARS-CoV-2, BEAR (no units)   Date Value   2020 NOT DETECTED             Wt Readings from Last 3 Encounters:   21 231 lb (104.8 kg)   21 224 lb (101.6 kg)   21 229 lb (103.9 kg)     BP Readings from Last 3 Encounters:   21 118/68   10/23/20 (!) 158/85   10/20/20 (!) 162/73      Lab Results   Component Value Date    LABA1C 7.3 2021    LABA1C 7.0 2020    LABA1C 7.0 2020       HPI:  Kenyetta Wylie is a 61 y.o. (: 1955) here today for    Patient states that her blood pressures at home have been running in the 120/80s. Despite this with her blood pressure being elevated today we are going to increase her lisinopril to 10mg daily. She states that her blood sugars have been in the 110-140s. Will check her A1C today. She states that she feels like the jardiance might not be working very well, she has been noticing that her sugars have been running higher than in the past. Will wait to see A1C to determine changes in medication  She states that her bowels are okay as long as she takes miralax daily. Patient denies wanting to follow up with the colonoscopy that was discussed. She is agreeable to a cologuard. [x] Patient has completed an advance directive  [] Patient has NOT completed an advanced directive  [x] Patient has a documented healthcare surrogate  [] Patient does NOT have a documented healthcare surrogate  [] Discussed the importance of establishing and updating an advanced directive. Patient has questions at this time and those were answered. [x] Discussed the importance of establishing and updating an advanced directive. Patient does NOT have questions at this time.     Discussed with: [x] Patient            [] Family             [] Other caregiver    Patient's medications, allergies, past medical, surgical, social and family histories were reviewed and updated asappropriate on 2021 at 1:41 PM.    ROS:  Review of Systems    All other systems reviewed and are negative except as noted above on 2021 at 1:41 PM. Additional review of systems may be scanned into the media section ofthis medical record. Any responses requiring further intervention were pursued.     Past Medical History:   Diagnosis Date    Allergic rhinitis     Dizziness 10/20/2020    Hearing loss     HTN (hypertension)     Hyperlipidemia     No history of procedure 16    colonoscopy    Type II or unspecified type diabetes mellitus without mention of complication, not stated as uncontrolled      Family History   Problem Relation Age of Onset    Diabetes Mother     Kidney Disease Mother     Heart Disease Mother     Heart Disease Father     High Blood Pressure Father     Breast Cancer Maternal Aunt      Social History     Socioeconomic History    Marital status:      Spouse name: Not on file    Number of children: Not on file    Years of education: Not on file    Highest education level: Not on file   Occupational History    Not on file   Tobacco Use    Smoking status: Former Smoker     Packs/day: 1.00     Years: 8.00     Pack years: 8.00     Types: Cigarettes     Quit date: 1983     Years since quittin.3    Smokeless tobacco: Never Used    Tobacco comment: pt has passive smoke exposure - spouse smokes in the house   Vaping Use    Vaping Use: Never used   Substance and Sexual Activity    Alcohol use: No     Alcohol/week: 0.0 standard drinks    Drug use: No    Sexual activity: Yes     Partners: Male   Other Topics Concern    Not on file   Social History Narrative    Not on file     Social Determinants of Health     Financial Resource Strain: Low Risk     Difficulty of Paying Living Expenses: Not hard at all   Food Insecurity: No Food Insecurity    Worried About Running Out of Food in the Last turns red and get hives    Metformin And Related      She states it caused joint pain. She tried it 3 times and had same reaction. OBJECTIVE:  Estimated body mass index is 42.25 kg/m² as calculated from the following:    Height as of 7/20/21: 5' 2\" (1.575 m). Weight as of this encounter: 231 lb (104.8 kg). Vitals:    11/02/21 1315 11/02/21 1347   BP: (!) 178/96 (!) 144/90   Pulse: 84    Temp: 97 °F (36.1 °C)    SpO2: 98%    Weight: 231 lb (104.8 kg)        Physical Exam  Vitals and nursing note reviewed. Constitutional:       General: She is not in acute distress. Appearance: She is well-developed. She is not diaphoretic. HENT:      Head: Normocephalic and atraumatic. Right Ear: External ear normal.      Left Ear: External ear normal.      Nose: Nose normal.   Eyes:      General: Lids are normal. No scleral icterus. Right eye: No discharge. Left eye: No discharge. Pupils: Pupils are equal, round, and reactive to light. Neck:      Thyroid: No thyromegaly. Vascular: No JVD. Cardiovascular:      Rate and Rhythm: Normal rate and regular rhythm. Heart sounds: Normal heart sounds. Pulmonary:      Effort: Pulmonary effort is normal. No respiratory distress. Breath sounds: Normal breath sounds. Abdominal:      Palpations: Abdomen is soft. There is no hepatomegaly or splenomegaly. Tenderness: There is no abdominal tenderness. Musculoskeletal:      Right lower leg: No edema. Left lower leg: No edema. Skin:     General: Skin is warm and dry. Coloration: Skin is not pale. Findings: No erythema or rash. Comments: Turgor normal   Neurological:      Mental Status: She is oriented to person, place, and time. Psychiatric:         Mood and Affect: Mood normal.         Behavior: Behavior normal.         Thought Content: Thought content normal.         Judgment: Judgment normal.              ASSESSMENT PLAN      Diagnosis Orders   1.  Type 2 diabetes mellitus without complication, without long-term current use of insulin (HCC)  HEMOGLOBIN A1C    Diabetic Foot Exam    Microalbumin / Creatinine Urine Ratio   2. Benign essential HTN  lisinopril (PRINIVIL;ZESTRIL) 10 MG tablet   3. Elevated blood pressure reading in office with diagnosis of hypertension  lisinopril (PRINIVIL;ZESTRIL) 10 MG tablet   4. Screen for colon cancer  Cologuard (For External Results Only)   5. Slow transit constipation     6. Vitamin D deficiency     7. Mixed hyperlipidemia     8. Subclavian artery stenosis, left (HCC)     9. Stenosis of right vertebral artery     Symptoms of elevated sugar. Blood pressure was elevated. Lisinopril doubled to 10 mg daily. She agreed to do the Cologuard. Dizziness remains about the same. Bowels are about the same. Continue lipid monitoring as needed. Vitamin D levels will be monitored as needed follow-up 6 months    Patient should call the office immediately with new or ongoing signs or symptoms or worsening, or proceed to the emergency room. No changes in past medical history, past surgical history, social history, or family history were noted during the patient encounter unless specifically listed above. All updates of past medical history, past surgical history, social history, or family history were reviewed personally by me during the office visit. All problems listed in the assessment are stable unless noted otherwise. Medication profile reviewed personally by me during the visit. Medication side effects and possible impairments from medications were discussed as applicable. This document was prepared by a combination of typing and transcription through a voice recognition software.                 Scribe attestation: Ashli Yoder RN, am scribing for and in the presence of Christy Rodriguez MD. Electronically signed by Tereza Porter RN on 11/2/2021 at 1:41 PM      Provider attestation:     Dr. Ramiro ANN, personally performed the services described in this documentation, as scribed by the above signed scribe in my presence, and it is both accurate and complete. I agree with the ROS and Past Histories independently gathered by the clinical support staff and the remaining scribed note accurately describes my personal service to the patient.       11/2/2021    3:46 PM

## 2021-11-03 LAB
CREATININE URINE: 49.4 MG/DL (ref 28–259)
ESTIMATED AVERAGE GLUCOSE: 174.3 MG/DL
HBA1C MFR BLD: 7.7 %
MICROALBUMIN UR-MCNC: <1.2 MG/DL
MICROALBUMIN/CREAT UR-RTO: NORMAL MG/G (ref 0–30)

## 2021-11-09 ENCOUNTER — TELEPHONE (OUTPATIENT)
Dept: FAMILY MEDICINE CLINIC | Age: 66
End: 2021-11-09

## 2021-11-09 NOTE — TELEPHONE ENCOUNTER
Patient will need to look at her new formulary to determine what is available in the same category as Jardiance that would be covered

## 2021-11-09 NOTE — TELEPHONE ENCOUNTER
Pt states that she will be getting new insurance and they will not pay for her Jardiance. And it is going to cost her $450.00 was wanting to see if there was anything else that it can be changed to. But she does not want metformin she uses [address].

## 2021-11-30 DIAGNOSIS — R19.5 POSITIVE COLORECTAL CANCER SCREENING USING COLOGUARD TEST: Primary | ICD-10-CM

## 2021-12-01 ENCOUNTER — TELEPHONE (OUTPATIENT)
Dept: FAMILY MEDICINE CLINIC | Age: 66
End: 2021-12-01

## 2021-12-15 RX ORDER — GLIMEPIRIDE 4 MG/1
TABLET ORAL
Qty: 60 TABLET | Refills: 5 | Status: SHIPPED | OUTPATIENT
Start: 2021-12-15 | End: 2022-09-12 | Stop reason: SDUPTHER

## 2022-01-06 ENCOUNTER — HOSPITAL ENCOUNTER (EMERGENCY)
Age: 67
Discharge: HOME | End: 2022-01-06
Payer: MEDICARE

## 2022-01-06 VITALS
DIASTOLIC BLOOD PRESSURE: 98 MMHG | BODY MASS INDEX: 42.25 KG/M2 | HEART RATE: 89 BPM | OXYGEN SATURATION: 97 % | TEMPERATURE: 97.7 F | WEIGHT: 231 LBS | SYSTOLIC BLOOD PRESSURE: 192 MMHG | RESPIRATION RATE: 18 BRPM

## 2022-01-06 DIAGNOSIS — L98.9 SKIN LESION: Primary | ICD-10-CM

## 2022-01-06 PROCEDURE — 99283 EMERGENCY DEPT VISIT LOW MDM: CPT

## 2022-01-06 PROCEDURE — 6370000000 HC RX 637 (ALT 250 FOR IP): Performed by: PHYSICIAN ASSISTANT

## 2022-01-06 RX ORDER — CLINDAMYCIN HYDROCHLORIDE 150 MG/1
450 CAPSULE ORAL 3 TIMES DAILY
Qty: 90 CAPSULE | Refills: 0 | Status: SHIPPED | OUTPATIENT
Start: 2022-01-06 | End: 2022-01-16

## 2022-01-06 RX ORDER — CLINDAMYCIN HYDROCHLORIDE 150 MG/1
450 CAPSULE ORAL ONCE
Status: COMPLETED | OUTPATIENT
Start: 2022-01-06 | End: 2022-01-06

## 2022-01-06 RX ADMIN — CLINDAMYCIN HYDROCHLORIDE 450 MG: 150 CAPSULE ORAL at 13:48

## 2022-01-06 ASSESSMENT — PAIN SCALES - GENERAL: PAINLEVEL_OUTOF10: 6

## 2022-01-06 ASSESSMENT — ENCOUNTER SYMPTOMS
RESPIRATORY NEGATIVE: 1
GASTROINTESTINAL NEGATIVE: 1

## 2022-01-06 ASSESSMENT — PAIN DESCRIPTION - PAIN TYPE: TYPE: ACUTE PAIN

## 2022-01-06 NOTE — ED PROVIDER NOTES
[address] ED  EMERGENCY DEPARTMENT ENCOUNTER        Pt Name: Sonido Albert  MRN: [id number]  [place] 1955  Date of evaluation: 1/6/2022  Provider: Minesh Lea PA-C  PCP: Isaias Jameson MD  Note Started: 1:34 PM EST       SCOTT. I have evaluated this patient. My supervising physician was available for consultation. CHIEF COMPLAINT       Chief Complaint   Patient presents with    Insect Bite     states 3-4 bites on back of leg. HISTORY OF PRESENT ILLNESS   (Location, Timing/Onset, Context/Setting, Quality, Duration, Modifying Factors, Severity, Associated Signs and Symptoms)  Note limiting factors. Chief Complaint: insect bite to upper right leg      Sonido Albert is a 61 y.o. female past medical history of former smoker hyperlipidemia diabetes hypertension brought in today by private vehicle with complaints of a possible insect bite to her left posterior leg. Onset of symptoms over the past several days. Duration symptoms have been persistent since onset. Context includes concern for an insect bite. Denies any injury or trauma. No aggravating symptoms. No alleviating symptoms. Denies any known fevers chills nausea vomiting diarrhea. Nothing seems to make symptoms better or worse. She has not taken or tried anything at home. Rates her current pain a 6 out of 10 no radiation of pain. She is up-to-date on her tetanus shot has been within 5 years. Reports that this is never happened before. Nursing Notes were all reviewed and agreed with or any disagreements were addressed in the HPI. REVIEW OF SYSTEMS    (2-9 systems for level 4, 10 or more for level 5)     Review of Systems   Constitutional: Negative. Respiratory: Negative. Cardiovascular: Negative. Gastrointestinal: Negative. Musculoskeletal: Positive for arthralgias. Skin: Positive for wound. Neurological: Negative. Positives and Pertinent negatives as per HPI.  Except as noted above in the ROS, all other systems were reviewed and negative.        PAST MEDICAL HISTORY     Past Medical History:   Diagnosis Date    Allergic rhinitis     Dizziness 10/20/2020    Hearing loss     HTN (hypertension)     Hyperlipidemia     No history of procedure 16    colonoscopy    Type II or unspecified type diabetes mellitus without mention of complication, not stated as uncontrolled          SURGICAL HISTORY     Past Surgical History:   Procedure Laterality Date    COLONOSCOPY  2016    normal    TUBAL LIGATION           CURRENTMEDICATIONS       Previous Medications    ASPIRIN 81 MG TABLET    Take 162 mg by mouth daily     CALCIUM CARBONATE (OSCAL) 500 MG TABS TABLET    Take 2 tablets by mouth daily    CETIRIZINE (ZYRTEC ALLERGY) 10 MG TABLET    Take 1 tablet by mouth daily    FLUTICASONE (FLONASE) 50 MCG/ACT NASAL SPRAY    In each nostril daily    GLIMEPIRIDE (AMARYL) 4 MG TABLET    TAKE ONE TABLET BY MOUTH TWICE A DAY    JARDIANCE 25 MG TABLET    TAKE ONE TABLET BY MOUTH DAILY    LISINOPRIL (PRINIVIL;ZESTRIL) 10 MG TABLET    Take 1 tablet by mouth daily    OMEGA-3 FATTY ACIDS (OMEGA 3 PO)    Take by mouth    ROSUVASTATIN (CRESTOR) 5 MG TABLET    TAKE ONE TABLET BY MOUTH ONCE NIGHTLY    VITAMIN D-3 (CHOLECALCIFEROL) 5000 UNITS TABS    Take 1 tablet by mouth daily         ALLERGIES     Codeine, Fenofibrate, and Metformin and related    FAMILYHISTORY       Family History   Problem Relation Age of Onset    Diabetes Mother     Kidney Disease Mother     Heart Disease Mother     Heart Disease Father     High Blood Pressure Father     Breast Cancer Maternal Aunt           SOCIAL HISTORY       Social History     Tobacco Use    Smoking status: Former Smoker     Packs/day: 1.00     Years: 8.00     Pack years: 8.00     Types: Cigarettes     Quit date: 1983     Years since quittin.5    Smokeless tobacco: Never Used    Tobacco comment: pt has passive smoke exposure - spouse smokes in the house   Vaping Use    Vaping Use: Never used   Substance Use Topics    Alcohol use: No     Alcohol/week: 0.0 standard drinks    Drug use: No       SCREENINGS             PHYSICAL EXAM    (up to 7 for level 4, 8 or more for level 5)     ED Triage Vitals   BP Temp Temp Source Pulse Resp SpO2 Height Weight   01/06/22 1317 01/06/22 1316 01/06/22 1316 01/06/22 1316 01/06/22 1316 01/06/22 1316 -- 01/06/22 1317   (!) 192/98 97.7 °F (36.5 °C) Temporal 89 18 97 %  231 lb (104.8 kg)       Physical Exam  Vitals and nursing note reviewed. Constitutional:       General: She is awake. She is not in acute distress. Appearance: Normal appearance. She is well-developed. She is obese. She is not ill-appearing, toxic-appearing or diaphoretic. HENT:      Head: Normocephalic and atraumatic. Nose: Nose normal.   Eyes:      General:         Right eye: No discharge. Left eye: No discharge. Cardiovascular:      Rate and Rhythm: Normal rate and regular rhythm. Heart sounds: Normal heart sounds. No murmur heard. No gallop. Pulmonary:      Effort: Pulmonary effort is normal. No respiratory distress. Breath sounds: Normal breath sounds. No wheezing or rales. Chest:      Chest wall: No tenderness. Musculoskeletal:         General: No deformity. Normal range of motion. Cervical back: Normal range of motion and neck supple. Skin:     General: Skin is warm and dry. Capillary Refill: Capillary refill takes less than 2 seconds. Findings: Erythema and lesion present. No abrasion, abscess, acne, bruising, burn, ecchymosis, signs of injury, laceration, petechiae, rash or wound. Comments: See picture below. Neurological:      Mental Status: She is alert and oriented to person, place, and time. Psychiatric:         Behavior: Behavior normal. Behavior is cooperative.                  DIAGNOSTIC RESULTS   LABS:    Labs Reviewed - No data to display    When ordered only abnormal lab results are displayed. All other labs were within normal range or not returned as of this dictation. EKG: When ordered, EKG's are interpreted by the Emergency Department Physician in the absence of a cardiologist.  Please see their note for interpretation of EKG. RADIOLOGY:   Non-plain film images such as CT, Ultrasound and MRI are read by the radiologist. Plain radiographic images are visualized and preliminarily interpreted by the ED Provider with the below findings:        Interpretation per the Radiologist below, if available at the time of this note:    No orders to display     No results found. PROCEDURES   Unless otherwise noted below, none     Procedures    CRITICAL CARE TIME   N/A    CONSULTS:  None      EMERGENCY DEPARTMENT COURSE and DIFFERENTIAL DIAGNOSIS/MDM:   Vitals:    Vitals:    01/06/22 1316 01/06/22 1317   BP:  (!) 192/98   Pulse: 89    Resp: 18    Temp: 97.7 °F (36.5 °C)    TempSrc: Temporal    SpO2: 97%    Weight:  231 lb (104.8 kg)       Patient was given the following medications:  Medications   clindamycin (CLEOCIN) capsule 450 mg (450 mg Oral Given 1/6/22 1348)           Patient brought in today by private vehicle with complaints of a possible insect bite to her left posterior upper leg. On exam she is alert oriented afebrile breathing on room air satting at 97%. Nontoxic in appearance. No acute respiratory distress. Old labs and records reviewed. Patient seen by myself my attending was available. Patient is up-to-date on her tetanus. Patient overall well-appearing vitals are hemodynamically stable. At this time I do not feel as though blood work or imaging is indicated. Will prescribe clindamycin. She will be discharged home with a course of clindamycin. She was told to monitor the lesion carefully and if it started to spread or she noticed increased redness or pain she is to return immediately to the ED.       She was told To follow-up with her PCP in the next 1 to 2 days for recheck. She verbalized understanding. I did feel comfortable sending this patient home with close follow-up instructions and strict return precautions. Patient discharged in stable condition. FINAL IMPRESSION      1. Skin lesion          DISPOSITION/PLAN   DISPOSITION        PATIENT REFERRED TO:  Miguel Pardo MD  [address] [place] [address].  Diann Bennett  [phone]    Schedule an appointment as soon as possible for a visit in 1 day      [facility] ED  [address] [address]  Schedule an appointment as soon as possible for a visit   As needed, If symptoms worsen      DISCHARGE MEDICATIONS:  New Prescriptions    CLINDAMYCIN (CLEOCIN) 150 MG CAPSULE    Take 3 capsules by mouth 3 times daily for 10 days       DISCONTINUED MEDICATIONS:  Discontinued Medications    No medications on file              (Please note that portions of this note were completed with a voice recognition program.  Efforts were made to edit the dictations but occasionally words are mis-transcribed.)    Andrew White PA-C (electronically signed)            Andrew White PA-C  01/06/22 [id number]

## 2022-01-19 ENCOUNTER — TELEPHONE (OUTPATIENT)
Dept: FAMILY MEDICINE CLINIC | Age: 67
End: 2022-01-19

## 2022-01-19 ENCOUNTER — VIRTUAL VISIT (OUTPATIENT)
Dept: FAMILY MEDICINE CLINIC | Age: 67
End: 2022-01-19
Payer: MEDICARE

## 2022-01-19 DIAGNOSIS — Z20.822 SUSPECTED COVID-19 VIRUS INFECTION: Primary | ICD-10-CM

## 2022-01-19 DIAGNOSIS — R09.89 CHEST CONGESTION: Primary | ICD-10-CM

## 2022-01-19 LAB — SARS-COV-2: POSITIVE

## 2022-01-19 PROCEDURE — 99441 PR PHYS/QHP TELEPHONE EVALUATION 5-10 MIN: CPT | Performed by: FAMILY MEDICINE

## 2022-01-19 ASSESSMENT — PATIENT HEALTH QUESTIONNAIRE - PHQ9
2. FEELING DOWN, DEPRESSED OR HOPELESS: 0
SUM OF ALL RESPONSES TO PHQ QUESTIONS 1-9: 0
SUM OF ALL RESPONSES TO PHQ9 QUESTIONS 1 & 2: 0
1. LITTLE INTEREST OR PLEASURE IN DOING THINGS: 0
SUM OF ALL RESPONSES TO PHQ QUESTIONS 1-9: 0

## 2022-01-19 NOTE — PROGRESS NOTES
Chapo Hamlin is a 74 y.o. female evaluated via telephone on 1/19/2022. States she has had productive cough, body aches, fever, chills, headache and sore throat X6 days. She also has severe weakness. She has not been tested for covid or the flu. She has taken OTC theraflu and tylenol with no relief. Internal Administration   First Dose      Second Dose           Last COVID Lab SARS-CoV-2, BEAR (no units)   Date Value   12/24/2020 NOT DETECTED            Wt Readings from Last 3 Encounters:   01/06/22 231 lb (104.8 kg)   11/02/21 231 lb (104.8 kg)   07/20/21 224 lb (101.6 kg)     BP Readings from Last 3 Encounters:   01/06/22 (!) 192/98   11/02/21 (!) 144/90   05/03/21 118/68     Lab Results   Component Value Date    LABA1C 7.7 11/02/2021    LABA1C 7.3 05/03/2021    LABA1C 7.0 08/17/2020        ASSESSMENT PLAN      Diagnosis Orders   1. Suspected COVID-19 virus infection     Patient is 6 days into illness. Offered consideration of specific COVID tablet but told her a test to be necessary and by the time we get results back supplies could be depleted. She is willing to go ahead and do the test order sent to [facility].  She is told to go to the emergency room with any shortness of breath. Other than her sore throat actually not doing too bad in terms of symptoms she does not have a pulse oximeter. Call with any ongoing symptoms or worsening. Patient should call the office immediately with new or ongoing signs or symptoms or worsening, or proceed to the emergency room. No changes in past medical history, past surgical history, social history, or family history were noted during the patient encounter unless specifically listed above. All updates of past medical history, past surgical history, social history, or family history were reviewed personally by me during the office visit. All problems listed in the assessment are stable unless noted otherwise.   Medication profile reviewed

## 2022-01-19 NOTE — TELEPHONE ENCOUNTER
----- Message from Yumivarshaapolinar Padron sent at 1/19/2022  3:22 PM EST -----  Subject: Results Request    QUESTIONS  Which lab or imaging result is the patient calling about? COVID-19 Test  Which provider ordered the test? Ana De La Paz   At what location was the test performed? Date the test was performed? 2022-01-19  Additional Information for Provider? Patient had a COVID-19 test done at   [address] [place] [address] and was told the results would be out in an hour. Please call patient back with results. ---------------------------------------------------------------------------  --------------  Ortega GARDINER  What is the best way for the office to contact you?  OK to leave message on   voicemail  Preferred Call Back Phone Number? [phone]

## 2022-01-20 DIAGNOSIS — R11.0 NAUSEA: Primary | ICD-10-CM

## 2022-01-20 RX ORDER — ONDANSETRON 4 MG/1
4 TABLET, ORALLY DISINTEGRATING ORAL 3 TIMES DAILY PRN
Qty: 21 TABLET | Refills: 0 | Status: SHIPPED
Start: 2022-01-20 | End: 2022-08-30 | Stop reason: CLARIF

## 2022-04-20 NOTE — PROGRESS NOTES
April 20, 2022     Patient: Matteo Paz   YOB: 2012   Date of Visit: 4/20/2022       To Whom it May Concern:    Matteo Paz was seen in my clinic on 4/20/2022 at 9:15 am.     Please excuse Matteo for his absence from school on the date listed above to be able to make his appointment. Adrian had a Covid test done and the results are Negative.    Sincerely,         Jaxson Petersen MD    Medical information is confidential and cannot be disclosed without the written consent of the patient or his representative.       Chief Complaint   Patient presents with    Diabetes    Hypertension    Hyperlipidemia    Other     patient has multiple medical complaints   Discussed that she needs to get back on her healthy eating as she said she has not been eating very well lately. Her A1C is improved. She states the lisinopril was dropping her blood pressure too much, will reorder at 2.5 mg daily to protect her kidneys. She has stopped her Onglyza due to cost, will observe for now. HPI:  Kerline Pritchett is a 66 y.o. (: 1955) here today for  Treatment Adherence:   Medication compliance:  compliant all of the time  Diet compliance:  noncompliant: sometimes  Weight trend: increasing  Current exercise: no regular exercise  Barriers: none    Diabetes Mellitus Type 2: Current symptoms/problems include none. Home blood sugar records: fasting range:   Any episodes of hypoglycemia? yes - a couple of times  Eye exam current (within one year): yes  Tobacco history: She  reports that she quit smoking about 35 years ago. She has never used smokeless tobacco.   Daily Aspirin? Yes    Hypertension:  Home blood pressure monitoring: Yes - 109-120/70's but she had some low 90/60's. She is not adherent to a low sodium diet. Patient denies chest pain and shortness of breath. Antihypertensive medication side effects: no medication side effects noted. Use of agents associated with hypertension: none. Hyperlipidemia:  No new myalgias or GI upset on rosuvastatin (Crestor).        Lab Results   Component Value Date    LABA1C 7.2 2018    LABA1C 7.3 2017    LABA1C 7.5 2017     Lab Results   Component Value Date    LABMICR <1.20 2018    CREATININE 0.7 2017     Lab Results   Component Value Date    ALT 28 2018    AST 26 2018     Lab Results   Component Value Date    CHOL 324 (H) 2018    TRIG 271 (H) 2018    HDL 56 2018    LDLCALC 214 (H) 2018    LDLDIRECT 246 (H) 10/27/2015 Patient's medications, allergies, past medical, surgical, social and family histories were reviewed and updated as appropriate on 7/23/2018 at 5:14 PM.    ROS:  Review of Systems    All other systems reviewed and are negative except as noted above on 7/23/2018 at 5:14 PM. Additional review of systems may be scanned into the media section of this medical record. Any responses requiring further intervention were pursued. Hemoglobin A1C (%)   Date Value   03/19/2018 7.2     Microalbumin, Random Urine (mg/dL)   Date Value   03/19/2018 <1.20     LDL Calculated (mg/dL)   Date Value   03/19/2018 214 (H)     Past Medical History:   Diagnosis Date    HTN (hypertension)     Hyperlipidemia     No history of procedure 2/5/16    colonoscopy    Type II or unspecified type diabetes mellitus without mention of complication, not stated as uncontrolled      Family History   Problem Relation Age of Onset    Diabetes Mother     Kidney Disease Mother     Heart Disease Mother     Heart Disease Father     High Blood Pressure Father      Social History     Social History    Marital status:      Spouse name: N/A    Number of children: N/A    Years of education: N/A     Occupational History    Not on file. Social History Main Topics    Smoking status: Former Smoker     Quit date: 7/12/1983    Smokeless tobacco: Never Used    Alcohol use No    Drug use: No    Sexual activity: Yes     Partners: Male     Other Topics Concern    Not on file     Social History Narrative    No narrative on file       Prior to Visit Medications    Medication Sig Taking?  Authorizing Provider   vitamin D-3 (CHOLECALCIFEROL) 5000 units TABS Take 1 tablet by mouth daily Yes Mayra Piña MD   rosuvastatin (CRESTOR) 5 MG tablet Take 1 tablet by mouth nightly Yes Mayra Piña MD   [facility] 300 MG TABS tablet TAKE ONE TABLET BY MOUTH EVERY [address] Yes Mayra Piña MD   [place]-3 Fatty Acids (OMEGA 3 PO) Take by mouth Yes Historical Provider, MD   glimepiride (AMARYL) 4 MG tablet TAKE ONE TABLET BY MOUTH TWICE A DAY  Patient taking differently: Augusta Shinchung MOUTH ONCE A DAY Yes Octavio Jin MD   fluticasone [facility]) 50 MCG/ACT nasal spray In each nostril daily Yes Octavio Jin MD   cetirizine (ZYRTEC ALLERGY) 10 MG tablet Take 1 tablet by mouth daily Yes Octavio Jin MD   calcium carbonate (OSCAL) 500 MG TABS tablet Take 2 tablets by mouth daily Yes Octavio Jin MD   aspirin 81 MG tablet Take 81 mg by mouth daily. Yes Historical Provider, MD   lisinopril (PRINIVIL;ZESTRIL) 10 MG tablet TAKE ONE TABLET BY MOUTH DAILY  Patient taking differently: TAKE 1/4 TABLET BY MOUTH DAILY  Octavio Jin MD     Allergies   Allergen Reactions    Codeine     Fenofibrate      Skin turns red and get hives       OBJECTIVE:  Estimated body mass index is 39.48 kg/m² as calculated from the following:    Height as of 1/30/18: 5' 2.25\" (1.581 m). Weight as of 2/8/18: 217 lb 9.6 oz (98.7 kg). Vitals:    07/23/18 1702 07/23/18 1720   BP: (!) 148/74 138/72   Site: Left Arm Left Arm   Position: Sitting Sitting   Cuff Size: Large Adult Large Adult   Pulse: 87    SpO2: 98%    Weight: 222 lb 6.4 oz (100.9 kg)    Height: 5' 2.5\" (1.588 m)      BP Readings from Last 2 Encounters:   07/23/18 138/72   02/08/18 (!) 124/90     Wt Readings from Last 3 Encounters:   02/08/18 217 lb 9.6 oz (98.7 kg)   01/30/18 221 lb 12.8 oz (100.6 kg)   07/31/17 222 lb 4.8 oz (100.8 kg)       Physical Exam   Constitutional: She appears well-developed and well-nourished. No distress. HENT:   Head: Normocephalic and atraumatic. Right Ear: External ear normal.   Left Ear: External ear normal.   Nose: Nose normal.   Lips symmetric   Eyes: Lids are normal. Pupils are equal, round, and reactive to light. Right eye exhibits no discharge. Left eye exhibits no discharge.  No scleral icterus. Neck: No JVD present. No thyromegaly present. Cardiovascular: Normal rate and regular rhythm. Murmur (1-2/6) heard. Systolic murmur is present with a grade of 2/6   Pulses:       Carotid pulses are on the right side with bruit, and on the left side with bruit. Carotid bruit left greater than right   Pulmonary/Chest: Effort normal and breath sounds normal. No respiratory distress. Abdominal: Soft. There is no hepatosplenomegaly. There is no tenderness. Musculoskeletal: She exhibits no edema. Skin: Skin is warm and dry. No rash noted. She is not diaphoretic. No erythema. No pallor. Turgor normal   Psychiatric: She has a normal mood and affect. Her behavior is normal. Judgment and thought content normal.   Nursing note and vitals reviewed. Results for POC orders placed in visit on 07/23/18   POCT glycosylated hemoglobin (Hb A1C)   Result Value Ref Range    Hemoglobin A1C 6.7 %            ASSESSMENT PLAN      Diagnosis Orders   1. Controlled type 2 diabetes mellitus without complication, without long-term current use of insulin ([facility])  POCT glycosylated hemoglobin (Hb A1C)   2. Benign essential HTN  lisinopril (PRINIVIL;ZESTRIL) 2.5 MG tablet   3. Vitamin D deficiency     4. Bilateral carotid bruits  Ultrasound carotid doppler   5. Mixed hyperlipidemia     6. Reactive depression     Overall sugar number has improved significantly, but she has been eating more the past few weeks and also when off the Onglyza. We will not make any changes at this point and recheck her A1c when she returns in 4 months. She may well need to have additional medication added. She is asked however to start restricting her caloric and carbohydrate intake now. She will restart lisinopril at 2.5 mg a day for renal protection. Get an ultrasound of the carotid arteries, did not notice bruits in the past.  Could also be a transmitted murmur. The murmur she does have a soft. Clarified her vitamin D dosing. She is on rosuvastatin now to lower cardiac risk. She believes her depression is doing quite well. Patient should call the office immediately with new or ongoing signs or symptoms or worsening, or proceed to the emergency room. No changes in past medical history, past surgical history, social history, or family history were noted during the patient encounter unless specifically listed above. All updates of past medical history, past surgical history, social history, or family history were reviewed personally by me during the office visit. All problems listed in the assessment are stable unless noted otherwise. Medication profile reviewed personally by me during the office visit. Medication side effects and possible impairments from medications were discussed as applicable. This document was prepared by a combination of typing and transcription through a voice recognition software. Scribe attestation: Dagoberto Montgomery RN, am scribing for and in the presence of Shannan Harry MD. Electronically signed by Melissa Arteaga RN on 7/23/2018 at 5:14 PM      Provider attestation:     I, Dr. Penelope Mcintyre, personally performed the services described in this documentation, as scribed by the above signed scribe in my presence, and it is both accurate and complete. I agree with the ROS and Past Histories independently gathered by the clinical support staff and the remaining scribed note accurately describes my personal service to the patient.       7/23/2018    5:42 PM

## 2022-04-27 ENCOUNTER — OFFICE VISIT (OUTPATIENT)
Dept: FAMILY MEDICINE CLINIC | Age: 67
End: 2022-04-27
Payer: MEDICARE

## 2022-04-27 VITALS
OXYGEN SATURATION: 96 % | SYSTOLIC BLOOD PRESSURE: 138 MMHG | BODY MASS INDEX: 42.25 KG/M2 | WEIGHT: 231 LBS | HEART RATE: 76 BPM | DIASTOLIC BLOOD PRESSURE: 78 MMHG

## 2022-04-27 DIAGNOSIS — L65.9 HAIR LOSS: ICD-10-CM

## 2022-04-27 DIAGNOSIS — Z86.16 HISTORY OF COVID-19: ICD-10-CM

## 2022-04-27 PROCEDURE — 99213 OFFICE O/P EST LOW 20 MIN: CPT | Performed by: FAMILY MEDICINE

## 2022-04-27 PROCEDURE — 36415 COLL VENOUS BLD VENIPUNCTURE: CPT | Performed by: FAMILY MEDICINE

## 2022-04-27 NOTE — PROGRESS NOTES
Chief Complaint   Patient presents with    Other     Pt having hair loss for about a month, pt states that about two weeks ago she used conditioner and t3 shampoo on her hair and put it in a towel and it was matted and falling out        Internal Administration   First Dose      Second Dose           Last COVID Lab SARS-CoV-2 (no units)   Date Value   2022 Positive (A)     SARS-CoV-2, BEAR (no units)   Date Value   2020 NOT DETECTED             Wt Readings from Last 3 Encounters:   22 231 lb (104.8 kg)   22 231 lb (104.8 kg)   21 231 lb (104.8 kg)     BP Readings from Last 3 Encounters:   22 (!) 140/78   22 (!) 192/98   21 (!) 144/90      Lab Results   Component Value Date    LABA1C 7.7 2021    LABA1C 7.3 2021    LABA1C 7.0 2020       HPI:  Trang Jackson is a 74 y.o. (: 1955) here today for    Patient states that her hair has been falling out for a month now. She was positive for covid back in January. Discussed how covid can cause this extreme hair loss. Also will check a CBC and TSH to ensure it is not this. [] Patient has completed an advance directive  [x] Patient has NOT completed an advanced directive  [] Patient has a documented healthcare surrogate  [x] Patient does NOT have a documented healthcare surrogate  [] Discussed the importance of establishing and updating an advanced directive. Patient has questions at this time and those were answered. [x] Discussed the importance of establishing and updating an advanced directive. Patient does NOT have questions at this time.     Discussed with: [x] Patient            [] Family             [] Other caregiver    Patient's medications, allergies, past medical, surgical, social and family histories were reviewed and updated asappropriate on 2022 at 2:54 PM.    ROS:  Review of Systems    All other systems reviewed and are negative except as noted above on 2022 at 2:54 PM. Additional review of systems may [facility] Any responses requiring further intervention were pursued. Past Medical History:   Diagnosis Date    Allergic rhinitis     Dizziness 10/20/2020    Hearing loss     HTN (hypertension)     Hyperlipidemia     No history of procedure 16    colonoscopy    Type II or unspecified type diabetes mellitus without mention of complication, not stated as uncontrolled      Family History   Problem Relation Age of Onset    Diabetes Mother     Kidney Disease Mother     Heart Disease Mother     Heart Disease Father     High Blood Pressure Father     Breast Cancer Maternal Aunt      Social History     Socioeconomic History    Marital status:      Spouse name: Not on file    Number of children: Not on file    Years of education: Not on file    Highest education level: Not on file   Occupational History    Not on file   Tobacco Use    Smoking status: Former Smoker     Packs/day: 1.00     Years: 8.00     Pack years: 8.00     Types: Cigarettes     Quit date: 1983     Years since quittin.8    Smokeless tobacco: Never Used    Tobacco comment: pt has passive smoke exposure - spouse smokes in the house   Vaping Use    Vaping Use: Never used   Substance and Sexual Activity    Alcohol use: No     Alcohol/week: 0.0 standard drinks    Drug use: No    Sexual activity: Yes     Partners: Male   Other Topics Concern    Not on file   Social History Narrative    Not on file     Social Determinants of Health     Financial Resource Strain: Low Risk     Difficulty of Paying Living Expenses: Not hard at all   Food Insecurity: No Food Insecurity    Worried About Running Out of Food in the Last Year: Never true    Vick of Food in the Last Year: Never true   Transportation Needs:     Lack of Transportation (Medical): Not on file    Lack of Transportation (Non-Medical):  Not on file   Physical Activity:     Days of Exercise per Week: Not on file    Minutes of Exercise per Session: Not on file   Stress:     Feeling of Stress : Not on file   Social Connections:     Frequency of Communication with Friends and Family: Not on file    Frequency of Social Gatherings with Friends and Family: Not on file    Attends Zoroastrianism Services: Not on file    Active Member of [address] [place] [address] or Organizations: Not on file    Attends Club or Organization Meetings: Not on file    Marital Status: Not on file   Intimate Partner Violence:     Fear of Current or Ex-Partner: Not on file    Emotionally Abused: Not on file    Physically Abused: Not on file    Sexually Abused: Not on file   Housing Stability:     Unable to Pay for Housing in the Last Year: Not on file    Number of [place] in the Last Year: Not on file    Unstable Housing in the Last Year: Not on file     Prior to Visit Medications    Medication Sig Taking?  Authorizing Provider   glimepiride (AMARYL) 4 MG tablet TAKE ONE TABLET BY MOUTH TWICE A DAY Yes Eder Polo MD   lisinopril (PRINIVIL;ZESTRIL) 10 MG tablet Take 1 tablet by mouth daily Yes Eder Polo MD   JARDIANCE 25 MG tablet TAKE ONE TABLET BY MOUTH DAILY Yes Eder Polo MD   rosuvastatin (CRESTOR) 5 MG tablet TAKE ONE TABLET BY MOUTH ONCE NIGHTLY Yes Eder Polo MD   vitamin D-3 (CHOLECALCIFEROL) 5000 units TABS Take 1 tablet by mouth daily  Patient taking differently: Take 5,000 Units by mouth daily 2-3 times a week Yes Eder Polo MD   Omega-3 Fatty Acids (OMEGA 3 PO) Take by mouth Yes Historical Provider, MD   fluticasone (FLONASE) 50 MCG/ACT nasal spray In each nostril daily Yes Eder Polo MD   cetirizine (ZYRTEC ALLERGY) 10 MG tablet Take 1 tablet by mouth daily Yes Eder Polo MD   calcium carbonate (OSCAL) 500 MG TABS tablet Take 2 tablets by mouth daily Yes Eder Polo MD   aspirin 81 MG tablet Take 162 mg by mouth daily  Yes Historical Provider, MD   ondansetron (ZOFRAN-ODT) 4 MG disintegrating tablet Place 1 tablet under the tongue 3 times daily as needed for Nausea or Vomiting  Patient not taking: Reported on 4/27/2022  JEANETTE Batres - CNP     Allergies   Allergen Reactions    Codeine     Fenofibrate      Skin turns red and get hives    Metformin And Related      She states it caused joint pain. She tried it 3 times and had same reaction. OBJECTIVE:  Estimated body mass index is 42.25 kg/m² as calculated from the following:    Height as of 7/20/21: 5' 2\" (1.575 m). Weight as of this encounter: 231 lb (104.8 kg). Vitals:    04/27/22 1447 04/27/22 1458   BP: (!) 140/78 138/78   Site: Right Upper Arm    Position: Sitting    Cuff Size: Large Adult    Pulse: 76    SpO2: 96%    Weight: 231 lb (104.8 kg)        Physical Exam  Vitals and nursing note reviewed. Constitutional:       General: She is not in acute distress. Appearance: She is well-developed. She is not diaphoretic. HENT:      Head: Normocephalic and atraumatic. Head contusion: no inflammation of the scalp noted. Cardiovascular:      Rate and Rhythm: Normal rate and regular rhythm. Heart sounds: Normal heart sounds. Pulmonary:      Effort: Pulmonary effort is normal. No respiratory distress. Breath sounds: Normal breath sounds. Abdominal:      Tenderness: There is no abdominal tenderness. Musculoskeletal:      Right lower leg: No edema. Left lower leg: No edema. Skin:     Comments: Thinning hair but no broken hairs and no scalp inflammation   Neurological:      Mental Status: She is alert and oriented to person, place, and time. Psychiatric:         Mood and Affect: Mood normal.         Behavior: Behavior normal.         Thought Content: Thought content normal.         Judgment: Judgment normal.              ASSESSMENT PLAN      Diagnosis Orders   1. Hair loss  CBC    TSH with Reflex   2. History of COVID-19     Suspect hair loss related to COVID-19. Told her there is nothing really due to enhance the return of the hair. Check CBC and TSH for other causes. Otherwise observe we will follow-up as regularly scheduled. Patient should call the office immediately with new or ongoing signs or symptoms or worsening, or proceed to the emergency room. No changes in past medical history, past surgical history, social history, or family history were noted during the patient encounter unless specifically listed above. All updates of past medical history, past surgical history, social history, or family history were reviewed personally by me during the office visit. All problems listed in the assessment are stable unless noted otherwise. Medication profile reviewed personally by me during the visit. Medication side effects and possible impairments from medications were discussed as applicable. This document was prepared by a combination of typing and transcription through a voice recognition software. Scribe attestation: Gretel Mcdonough RN, am scribing for and in the presence of Pau Rollins MD. Electronically signed by Judith Bowen RN on 4/27/2022 at 2:54 PM      Provider attestation:     I, Dr. Manjit Kraft, personally performed the services described in this documentation, as scribed by the above signed scribe in my presence, and it is both accurate and complete. I agree with the ROS and Past Histories independently gathered by the clinical support staff and the remaining scribed note accurately describes my personal service to the patient.       4/27/2022    5:11 PM

## 2022-04-28 LAB
HCT VFR BLD CALC: 43.4 % (ref 36–48)
HEMOGLOBIN: 14.5 G/DL (ref 12–16)
MCH RBC QN AUTO: 28.2 PG (ref 26–34)
MCHC RBC AUTO-ENTMCNC: 33.5 G/DL (ref 31–36)
MCV RBC AUTO: 84.4 FL (ref 80–100)
PDW BLD-RTO: 15.1 % (ref 12.4–15.4)
PLATELET # BLD: 228 K/UL (ref 135–450)
PMV BLD AUTO: 8.9 FL (ref 5–10.5)
RBC # BLD: 5.14 M/UL (ref 4–5.2)
TSH REFLEX: 1.75 UIU/ML (ref 0.27–4.2)
WBC # BLD: 5.4 K/UL (ref 4–11)

## 2022-05-03 ENCOUNTER — TELEPHONE (OUTPATIENT)
Dept: FAMILY MEDICINE CLINIC | Age: 67
End: 2022-05-03

## 2022-05-03 DIAGNOSIS — J02.9 SORE THROAT: Primary | ICD-10-CM

## 2022-05-03 NOTE — TELEPHONE ENCOUNTER
Pt doesn't want to do a virtual visit -recommend covid testing and any other testing prior to being seen in office?

## 2022-05-03 NOTE — TELEPHONE ENCOUNTER
----- Message from Raisaribreecherrie De Dios sent at 5/3/2022  8:09 AM EDT -----  Subject: Appointment Request    Reason for Call: Routine Existing Condition Follow Up (Diabetes)    QUESTIONS  Type of Appointment? Established Patient  Reason for appointment request? Available appointments did not meet   patient need  Additional Information for Provider? HA  Sore Throat x Today  Patient   does not want a VV. Patient is requesting to be seen in the office  ---------------------------------------------------------------------------  --------------  [employer]  What is the best way for the office to contact you? OK to leave message on   voicemail  Preferred Call Back Phone Number? [phone]  ---------------------------------------------------------------------------  --------------  SCRIPT ANSWERS  Relationship to Patient? Self  Have your symptoms changed? Yes  Is this follow up request related to routine Diabetes Management? Yes  Have you been diagnosed with, awaiting test results for, or told that you   are suspected of having COVID-19 (Coronavirus)? (If patient has tested   negative or was tested as a requirement for work, school, or travel and   not based on symptoms, answer no)? No  Within the past 10 days have you developed any of the following symptoms   (answer no if symptoms have been present longer than 10 days or began   more than 10 days ago)? Fever or Chills, Cough, Shortness of breath or   difficulty breathing, Loss of taste or smell, Sore throat, Nasal   congestion, Sneezing or runny nose, Fatigue or generalized body aches   (answer no if pain is specific to a body part e.g. back pain), Diarrhea,   Headache?  Yes

## 2022-05-03 NOTE — TELEPHONE ENCOUNTER
Left message on voicemail for pt to return call, cannot be seen in office due to symptoms. Placed orders for Strep and Covid for [facility].

## 2022-06-14 ENCOUNTER — TELEPHONE (OUTPATIENT)
Dept: PHARMACY | Facility: CLINIC | Age: 67
End: 2022-06-14

## 2022-06-14 NOTE — TELEPHONE ENCOUNTER
[facility] CLINICAL PHARMACY: ADHERENCE REVIEW  Identified care gap per [facility]: fills at [facility]: Diabetes and Statin adherence    Last Visit: 4/27/22    DIABETES ADHERENCE    Insurance Records claims through 5.23.22 58%; Potential Fail Date: 6/16/22):   JARDIANCE    TAB 25MG Next refill due: 4/28/22    Per [facility] Portal:   last filled on 4/27/22 for 30 day supply. Per [address]:   Filled 5/27/22    Lab Results   Component Value Date    LABA1C 7.7 11/02/2021    LABA1C 7.3 05/03/2021    LABA1C 7.0 08/17/2020     NOTE A1c not yet completed this calendar year    [address] Records claims through 5.28.22 YTD [facility] Tiffany = 58%; Potential Fail Date: 6/16/22):   ROSUVASTATIN 5mg. Next refill due: 4/28/22 (30DS)    Per [facility] Portal:   last filled on 4/27/22 for 90 day supply. Lab Results   Component Value Date    CHOL 190 04/27/2021    TRIG 195 (H) 04/27/2021    HDL 61 (H) 04/27/2021    LDLCALC 90 04/27/2021    LDLDIRECT 173 (H) 02/25/2020     ALT   Date Value Ref Range Status   04/27/2021 21 10 - 40 U/L Final     AST   Date Value Ref Range Status   04/27/2021 17 15 - 37 U/L Final     The 10-year ASCVD risk score ([address] et al., 2013) is: 15%    Values used to calculate the score:      Age: 74 years      Sex: Female      Is Non- : No      Diabetic: Yes      Tobacco smoker: No      Systolic Blood Pressure: 057 mmHg      Is BP treated: Yes      HDL Cholesterol: 61 mg/dL      Total Cholesterol: 190 mg/dL     PLAN    No patient outreach    No future appointments. Taylor Logan CPhT.    [address] [facility] free: [address] [facility] Ext in place:  No   Gap Closed?: No    Time Spent (min): 15

## 2022-06-30 RX ORDER — EMPAGLIFLOZIN 25 MG/1
TABLET, FILM COATED ORAL
Qty: 30 TABLET | Refills: 5 | Status: SHIPPED | OUTPATIENT
Start: 2022-06-30

## 2022-07-12 ENCOUNTER — TELEPHONE (OUTPATIENT)
Dept: PHARMACY | Facility: CLINIC | Age: 67
End: 2022-07-12

## 2022-07-12 NOTE — LETTER
[place]  [address], [address]   [address] [address]           07/13/22     Dear Ruel Lewis,    We tried to reach you recently regarding your Rosuvastatin, Glimepiride, and Jardiance   , but were unable to reach you on the telephone. We have on file that you are currently taking Rosuvastatin, Glimepiride, and Jardiance   . If you are no longer taking or taking differently, please call us at the number below so that we can discuss this and update your medication profile. It appears that this medication has not been filled at proper times. We are worried you might be missing doses or not taking it as directed. It is important that you take your medications regularly and try not to miss a single dose. Some ways to help you remember to take and refill your medications are to:  · Use a pill box, set an alarm, and/or keep your medication near something that you do every day  · Fill a 3-month supply of your prescription at a time to save you time and trips to the pharmacy - if you would like assistance in switching your prescriptions to a 3-month supply, please contact us.    · Ask your pharmacy if they participate in [facility]", a program where you can  all of your medications on the same day  · Ask your pharmacy if you can be set up with automatic refill, where they will automatically refill your prescription when it is due and let you know it's ready to     Sincerely,   Verita Nageotte, [address] [place] [address]   Direct: [phone]  Phone: toll free [phone]

## 2022-07-12 NOTE — TELEPHONE ENCOUNTER
[facility] CLINICAL PHARMACY: ADHERENCE REVIEW  Identified care gap per [facility]: fills at [facility]: ACE/ARB, Diabetes and Statin adherence    Last Visit: 4/27/22      ASSESSMENT  ACE/ARB ADHERENCE    Insurance Records claims through 6/20/22  [facility] Tiffany = 89%; Potential Fail Date: 10/13/22):   LISINOPRIL 10 MG TABLET due to refill for 90 day supply. Per [employer] [place]:   LISINOPRIL 10 MG TABLET last picked up on 5/25/22 for 90 day supply. 1 refills remaining. Billed through [facility]     BP Readings from Last 3 Encounters:   04/27/22 138/78   01/06/22 (!) 192/98   11/02/21 (!) 144/90     CrCl cannot be calculated (Patient's most recent lab result is older than the maximum 180 days allowed. ). DIABETES ADHERENCE    Insurance Records claims through 6/20/22 [facility] Tiffany = 66%; Potential Fail Date: 7/15/22):   GLIMEPIRIDE 4 MG TABLET due to refill 6/23/22 for 30 day supply. Per [employer] [place]:   GLIMEPIRIDE 4 MG TABLET already awaiting patient  for 30 d/s. 1 refill remaining. Billed through [address] Per [employer] [place]:   Michael Dire already awaiting patient  for 30 d/s. 1 refill remaining. Billed through [address] Lab Results   Component Value Date    LABA1C 7.7 11/02/2021    LABA1C 7.3 05/03/2021    LABA1C 7.0 08/17/2020     NOTE A1c not yet completed this calendar year    [address] Records claims through 6/20/22  [facility] = 66%; Potential Fail Date: 7/15/22):   ROSUVASTATIN CALCIUM 5 MG TAB due to refill 6/23/22 for 30 day supply. Per [employer] [place]:   ROSUVASTATIN CALCIUM 5 MG TAB already awaiting patient  for 30 d/s. 1 refill remaining. Billed through [address]      Lab Results   Component Value Date    CHOL 190 04/27/2021    TRIG 195 (H) 04/27/2021    HDL 61 (H) 04/27/2021    LDLCALC 90 04/27/2021    LDLDIRECT 173 (H) 02/25/2020     ALT   Date Value Ref Range Status   04/27/2021 21 10 - 40 U/L Final     AST   Date Value Ref Range Status   04/27/2021 17 15 - 37 U/L Final     The 10-year ASCVD risk score (Kandy Cortez, et al., 2013) is: 15%    Values used to calculate the score:      Age: 74 years      Sex: Female      Is Non- : No      Diabetic: Yes      Tobacco smoker: No      Systolic Blood Pressure: 432 mmHg      Is BP treated: Yes      HDL Cholesterol: 61 mg/dL      Total Cholesterol: 190 mg/dL     PLAN  The following are interventions that have been identified:   - Patient overdue refilling Rosuvastatin, Glimepiride, and Jardiance and active on home medication list.   - Patient eligible for 90 day supply of Rosuvastatin, Glimepiride, and Jardiance     All overdue prescriptions filled and patient just needs to .      Unable to leave message and [employer] message sent to patient      Future Appointments   Date Time Provider Garrett Jimenez   7/19/2022 10:40 AM Zonia Mcdonald MD [address] [place] [address]   Direct: [phone]  Phone: toll free [phone]

## 2022-07-15 NOTE — TELEPHONE ENCOUNTER
Marked for f/u to confirm rosuvastatin, glimepiride and Januvia were picked up.      Devorah Roberto, PharmD, [address] // Department, toll free [phone], option 1

## 2022-07-15 NOTE — TELEPHONE ENCOUNTER
Called [facility] to confirm rosuvastatin, glimepiride and Jardiance. All 3 have been RTS. Left message for patient to return call or contact [facility] for past due refills.

## 2022-07-18 RX ORDER — ROSUVASTATIN CALCIUM 5 MG/1
TABLET, COATED ORAL
Qty: 30 TABLET | Refills: 8 | Status: SHIPPED | OUTPATIENT
Start: 2022-07-18 | End: 2022-08-31

## 2022-08-30 ENCOUNTER — OFFICE VISIT (OUTPATIENT)
Dept: FAMILY MEDICINE CLINIC | Age: 67
End: 2022-08-30
Payer: MEDICARE

## 2022-08-30 VITALS
SYSTOLIC BLOOD PRESSURE: 132 MMHG | OXYGEN SATURATION: 97 % | BODY MASS INDEX: 42.07 KG/M2 | DIASTOLIC BLOOD PRESSURE: 80 MMHG | WEIGHT: 230 LBS | HEART RATE: 81 BPM

## 2022-08-30 DIAGNOSIS — F32.9 REACTIVE DEPRESSION: ICD-10-CM

## 2022-08-30 DIAGNOSIS — E78.2 MIXED HYPERLIPIDEMIA: ICD-10-CM

## 2022-08-30 DIAGNOSIS — K59.01 SLOW TRANSIT CONSTIPATION: ICD-10-CM

## 2022-08-30 DIAGNOSIS — E11.9 TYPE 2 DIABETES MELLITUS WITHOUT COMPLICATION, WITHOUT LONG-TERM CURRENT USE OF INSULIN (HCC): ICD-10-CM

## 2022-08-30 DIAGNOSIS — L65.9 HAIR LOSS: ICD-10-CM

## 2022-08-30 DIAGNOSIS — Z00.00 INITIAL MEDICARE ANNUAL WELLNESS VISIT: Primary | ICD-10-CM

## 2022-08-30 DIAGNOSIS — E55.9 VITAMIN D DEFICIENCY: ICD-10-CM

## 2022-08-30 PROCEDURE — 1123F ACP DISCUSS/DSCN MKR DOCD: CPT | Performed by: FAMILY MEDICINE

## 2022-08-30 PROCEDURE — 99213 OFFICE O/P EST LOW 20 MIN: CPT | Performed by: FAMILY MEDICINE

## 2022-08-30 PROCEDURE — G0438 PPPS, INITIAL VISIT: HCPCS | Performed by: FAMILY MEDICINE

## 2022-08-30 PROCEDURE — 36415 COLL VENOUS BLD VENIPUNCTURE: CPT | Performed by: FAMILY MEDICINE

## 2022-08-30 ASSESSMENT — PATIENT HEALTH QUESTIONNAIRE - PHQ9
7. TROUBLE CONCENTRATING ON THINGS, SUCH AS READING THE NEWSPAPER OR WATCHING TELEVISION: 1
3. TROUBLE FALLING OR STAYING ASLEEP: 0
6. FEELING BAD ABOUT YOURSELF - OR THAT YOU ARE A FAILURE OR HAVE LET YOURSELF OR YOUR FAMILY DOWN: 0
10. IF YOU CHECKED OFF ANY PROBLEMS, HOW DIFFICULT HAVE THESE PROBLEMS MADE IT FOR YOU TO DO YOUR WORK, TAKE CARE OF THINGS AT HOME, OR GET ALONG WITH OTHER PEOPLE: 0
9. THOUGHTS THAT YOU WOULD BE BETTER OFF DEAD, OR OF HURTING YOURSELF: 0
2. FEELING DOWN, DEPRESSED OR HOPELESS: 1
1. LITTLE INTEREST OR PLEASURE IN DOING THINGS: 0
SUM OF ALL RESPONSES TO PHQ QUESTIONS 1-9: 3
5. POOR APPETITE OR OVEREATING: 0
8. MOVING OR SPEAKING SO SLOWLY THAT OTHER PEOPLE COULD HAVE NOTICED. OR THE OPPOSITE, BEING SO FIGETY OR RESTLESS THAT YOU HAVE BEEN MOVING AROUND A LOT MORE THAN USUAL: 0
SUM OF ALL RESPONSES TO PHQ QUESTIONS 1-9: 3
SUM OF ALL RESPONSES TO PHQ9 QUESTIONS 1 & 2: 1
4. FEELING TIRED OR HAVING LITTLE ENERGY: 1

## 2022-08-30 ASSESSMENT — LIFESTYLE VARIABLES
HOW MANY STANDARD DRINKS CONTAINING ALCOHOL DO YOU HAVE ON A TYPICAL DAY: PATIENT DOES NOT DRINK
HOW OFTEN DO YOU HAVE A DRINK CONTAINING ALCOHOL: NEVER

## 2022-08-30 NOTE — PROGRESS NOTES
Chief Complaint   Patient presents with    Hypertension    Diabetes    Medicare AWV        Internal Administration   First Dose      Second Dose           Last COVID Lab SARS-CoV-2 (no units)   Date Value   2022 Positive (A)     SARS-CoV-2, BEAR (no units)   Date Value   2020 NOT DETECTED             Wt Readings from Last 3 Encounters:   22 230 lb (104.3 kg)   22 231 lb (104.8 kg)   22 231 lb (104.8 kg)     BP Readings from Last 3 Encounters:   22 132/80   22 138/78   22 (!) 192/98      Lab Results   Component Value Date    LABA1C 7.7 2021    LABA1C 7.3 2021    LABA1C 7.0 2020       HPI:  Jl Figueroa is a 74 y.o. (: 1955) here today for    She states that her blood sugars range  usually are under 150 though. She states that her bowels are well controlled on the miralax. She states that her hair is doing much better and is starting to come back in.     [] Patient has completed an advance directive  [x] Patient has NOT completed an advanced directive  [] Patient has a documented healthcare surrogate  [x] Patient does NOT have a documented healthcare surrogate  [] Discussed the importance of establishing and updating an advanced directive. Patient has questions at this time and those were answered. [x] Discussed the importance of establishing and updating an advanced directive. Patient does NOT have questions at this time. Discussed with: [x] Patient            [] Family             [] Other caregiver    Patient's medications, allergies, past medical, surgical, social and family histories were reviewed and updated asappropriate on 2022 at 11:36 AM.    ROS:  Review of Systems    All other systems reviewed and are negative except as noted above on 2022 at 11:36 AM. Additional review of systems may be scanned into the media section ofthis medical record.   Any responses requiring further intervention were pursued.     Past Medical History:   Diagnosis Date    Allergic rhinitis     Dizziness 10/20/2020    Hearing loss     HTN (hypertension)     Hyperlipidemia     No history of procedure 16    colonoscopy    Type II or unspecified type diabetes mellitus without mention of complication, not stated as uncontrolled      Family History   Problem Relation Age of Onset    Diabetes Mother     Kidney Disease Mother     Heart Disease Mother     Heart Disease Father     High Blood Pressure Father     Breast Cancer Maternal Aunt      Social History     Socioeconomic History    Marital status:      Spouse name: Not on file    Number of children: Not on file    Years of education: Not on file    Highest education level: Not on file   Occupational History    Not on file   Tobacco Use    Smoking status: Former     Packs/day: 1.00     Years: 8.00     Pack years: 8.00     Types: Cigarettes     Quit date: 1983     Years since quittin.1    Smokeless tobacco: Never    Tobacco comments:     pt has passive smoke exposure - spouse smokes in the house   Vaping Use    Vaping Use: Never used   Substance and Sexual Activity    Alcohol use: No     Alcohol/week: 0.0 standard drinks    Drug use: No    Sexual activity: Yes     Partners: Male   Other Topics Concern    Not on file   Social History Narrative    Not on file     Social Determinants of Health     Financial Resource Strain: Low Risk     Difficulty of Paying Living Expenses: Not hard at all   Food Insecurity: No Food Insecurity    Worried About Running Out of Food in the Last Year: Never true    Vick of Food in the Last Year: Never true   Transportation Needs: Not on file   Physical Activity: Insufficiently Active    Days of Exercise per Week: 2 days    Minutes of Exercise per Session: 10 min   Stress: Not on file   Social Connections: Not on file   Intimate Partner Violence: Not on file   Housing Stability: Not on file     Prior to Visit Medications    Medication Sig Taking? Authorizing Provider   rosuvastatin (CRESTOR) 5 MG tablet TAKE ONE TABLET BY MOUTH ONCE NIGHTLY Yes Apolinar Rodriguez MD   JARDIANCE 25 MG tablet TAKE ONE TABLET BY MOUTH DAILY Yes Apolinar Rodriguez MD   glimepiride (AMARYL) 4 MG tablet TAKE ONE TABLET BY MOUTH TWICE A DAY Yes Apolinar Rodriguez MD   lisinopril (PRINIVIL;ZESTRIL) 10 MG tablet Take 1 tablet by mouth daily Yes Apolinar Rodriguez MD   vitamin D-3 (CHOLECALCIFEROL) 5000 units TABS Take 1 tablet by mouth daily  Patient taking differently: Take 5,000 Units by mouth daily 2-3 times a week Yes Apolinar Rodriguez MD   Omega-3 Fatty Acids (OMEGA 3 PO) Take by mouth Yes Historical Provider, MD   fluticasone (FLONASE) 50 MCG/ACT nasal spray In each nostril daily Yes Apolinar Rodriguez MD   cetirizine (ZYRTEC ALLERGY) 10 MG tablet Take 1 tablet by mouth daily Yes Apolinar Rodriguez MD   calcium carbonate (OSCAL) 500 MG TABS tablet Take 2 tablets by mouth daily Yes Apolinar Rodriguez MD   aspirin 81 MG tablet Take 162 mg by mouth daily  Yes Historical Provider, MD     Allergies   Allergen Reactions    Codeine     Fenofibrate      Skin turns red and get hives    Metformin And Related      She states it caused joint pain. She tried it 3 times and had same reaction. OBJECTIVE:  Estimated body mass index is 42.07 kg/m² as calculated from the following:    Height as of 7/20/21: 5' 2\" (1.575 m). Weight as of this encounter: 230 lb (104.3 kg). Vitals:    08/30/22 1119   BP: 132/80   Pulse: 81   SpO2: 97%   Weight: 230 lb (104.3 kg)       Physical Exam  Vitals and nursing note reviewed. Constitutional:       General: She is not in acute distress. Appearance: She is well-developed. She is not diaphoretic. HENT:      Head: Normocephalic and atraumatic.       Right Ear: External ear normal.      Left Ear: External ear normal.      Nose: Nose normal.   Eyes:      General: Lids are normal. No parameters as needed. Lipids will be monitored based upon levels requiring treatment and other cardiac risks. Medications for hyperlipidemia and hypertriglyceridemia as listed on the medication list will be changed as necessary to reach control parameters. Bowels are moving fine. Follow-up 4 months. Patient should call the office immediately with new or ongoing signs or symptoms or worsening, or proceed to the emergency room. No changes in past medical history, past surgical history, social history, or family history were noted during the patient encounter unless specifically listed above. All updates of past medical history, past surgical history, social history, or family history were reviewed personally by me during the office visit. All problems listed in the assessment are stable unless noted otherwise. Medication profile reviewed personally by me during the visit. Medication side effects and possible impairments from medications were discussed as applicable. This document was prepared by a combination of typing and transcription through a voice recognition software. Scribe attestation: Keturah Gilman RN, am scribing for and in the presence of Piotr Vallejo MD. Electronically signed by Royal Whittaker RN on 8/30/2022 at 11:36 AM      Provider attestation:     I, Dr. Luis Miguel Fair, personally performed the services described in this documentation, as scribed by the above signed scribe in my presence, and it is both accurate and complete. I agree with the ROS and Past Histories independently gathered by the clinical support staff and the remaining scribed note accurately describes my personal service to the patient.       8/30/2022    1:35 PM

## 2022-08-30 NOTE — PATIENT INSTRUCTIONS
Personalized Preventive Plan for Sydnee Fallon - 8/30/2022  Medicare offers a range of preventive health benefits. Some of the tests and screenings are paid in full while other may be subject to a deductible, co-insurance, and/or copay. Some of these benefits include a comprehensive review of your medical history including lifestyle, illnesses that may run in your family, and various assessments and screenings as appropriate. After reviewing your medical record and screening and assessments performed today your provider may have ordered immunizations, labs, imaging, and/or referrals for you. A list of these orders (if applicable) as well as your Preventive Care list are included within your After Visit Summary for your review. Other Preventive Recommendations:    A preventive eye exam performed by an eye specialist is recommended every 1-2 years to screen for glaucoma; cataracts, macular degeneration, and other eye disorders. A preventive dental visit is recommended every 6 months. Try to get at least 150 minutes of exercise per week or 10,000 steps per day on a pedometer . Order or download the FREE \"Exercise & Physical Activity: Your Everyday Guide\" from [employer]. Call 9-905.855.9073 or search [employer] online. You need 6249-3529 mg of calcium and 6410-2961 IU of vitamin D per day. It is possible to meet your calcium requirement with diet alone, but a vitamin D supplement is usually necessary to meet this goal.  When exposed to the sun, use a sunscreen that protects against both UVA and UVB radiation with an SPF of 30 or greater. Reapply every 2 to 3 hours or after sweating, drying off with a towel, or swimming. Always wear a seat belt when traveling in a car. Always wear a helmet when riding a bicycle or motorcycle.

## 2022-08-30 NOTE — PROGRESS NOTES
Medicare Annual Wellness Visit    Brushton Began is here for Hypertension, Diabetes, and Medicare AWV    Assessment & Plan   Initial Medicare annual wellness visit    Recommendations for Preventive Services Due: see orders and patient instructions/AVS.  Recommended screening schedule for the next 5-10 years is provided to the patient in written form: see Patient Instructions/AVS.     No follow-ups on file. Subjective       Patient's complete Health Risk Assessment and screening values have been reviewed and are found in Flowsheets. The following problems were reviewed today and where indicated follow up appointments were made and/or referrals ordered. Positive Risk Factor Screenings with Interventions:             General Health and ACP:       Advance Directives       Power of  Living Will ACP-Advance Directive ACP-Power of     Not on File Not on File Not on File Not on File          General Health Risk Interventions:  No Living Will: Advance Care Planning addressed with patient today    Health Habits/Nutrition:  Physical Activity: Not on file              Health Habits/Nutrition Interventions:  No interventions needed at this time. Objective   Vitals:    08/30/22 1119   BP: 132/80   Pulse: 81   SpO2: 97%   Weight: 230 lb (104.3 kg)      Body mass index is 42.07 kg/m². Allergies   Allergen Reactions    Codeine     Fenofibrate      Skin turns red and get hives    Metformin And Related      She states it caused joint pain. She tried it 3 times and had same reaction. Prior to Visit Medications    Medication Sig Taking?  Authorizing Provider   rosuvastatin (CRESTOR) 5 MG tablet TAKE ONE TABLET BY MOUTH ONCE NIGHTLY Yes Mery Taylor MD   JARDIANCE 25 MG tablet TAKE ONE TABLET BY MOUTH DAILY Yes Mery Taylor MD   glimepiride (AMARYL) 4 MG tablet TAKE ONE TABLET BY MOUTH TWICE A DAY Yes Mery Taylor MD   lisinopril (PRINIVIL;ZESTRIL) 10 MG tablet Take 1 tablet by mouth daily Yes Ping Dela Cruz MD   vitamin D-3 (CHOLECALCIFEROL) 5000 units TABS Take 1 tablet by mouth daily  Patient taking differently: Take 5,000 Units by mouth daily 2-3 times a week Yes Ping Dela Cruz MD   Omega-3 Fatty Acids (OMEGA 3 PO) Take by mouth Yes Historical Provider, MD   fluticasone (FLONASE) 50 MCG/ACT nasal spray In each nostril daily Yes Ping Dela Cruz MD   cetirizine (ZYRTEC ALLERGY) 10 MG tablet Take 1 tablet by mouth daily Yes Ping Dela Cruz MD   calcium carbonate (OSCAL) 500 MG TABS tablet Take 2 tablets by mouth daily Yes Ping Dela Cruz MD   aspirin 81 MG tablet Take 162 mg by mouth daily  Yes Historical Provider, MD Robertson (Including outside providers/suppliers regularly involved in providing care):   Patient Care Team:  Ping Dela Cruz MD as PCP - General (Family Medicine)  Ping Dela Cruz MD as PCP - [facility] Empaneled Provider     Reviewed and updated this visit:  Allergies  Meds             I, Dalia Riddle LPN, [birth year]/02/[birth year]50, performed the documented evaluation under the direct supervision of the attending physician. This encounter was performed under my, Quentin Lopez, direct supervision, 8/30/2022.

## 2022-08-31 DIAGNOSIS — E11.9 TYPE 2 DIABETES MELLITUS WITHOUT COMPLICATION, WITHOUT LONG-TERM CURRENT USE OF INSULIN (HCC): Primary | ICD-10-CM

## 2022-08-31 DIAGNOSIS — E78.2 MIXED HYPERLIPIDEMIA: ICD-10-CM

## 2022-08-31 LAB
A/G RATIO: 1.5 (ref 1.1–2.2)
ALBUMIN SERPL-MCNC: 4.8 G/DL (ref 3.4–5)
ALP BLD-CCNC: 88 U/L (ref 40–129)
ALT SERPL-CCNC: 24 U/L (ref 10–40)
ANION GAP SERPL CALCULATED.3IONS-SCNC: 15 MMOL/L (ref 3–16)
AST SERPL-CCNC: 22 U/L (ref 15–37)
BASOPHILS ABSOLUTE: 0 K/UL (ref 0–0.2)
BASOPHILS RELATIVE PERCENT: 0.5 %
BILIRUB SERPL-MCNC: 0.3 MG/DL (ref 0–1)
BUN BLDV-MCNC: 20 MG/DL (ref 7–20)
CALCIUM SERPL-MCNC: 9.7 MG/DL (ref 8.3–10.6)
CHLORIDE BLD-SCNC: 99 MMOL/L (ref 99–110)
CHOLESTEROL, TOTAL: 269 MG/DL (ref 0–199)
CO2: 25 MMOL/L (ref 21–32)
CREAT SERPL-MCNC: 0.8 MG/DL (ref 0.6–1.2)
EOSINOPHILS ABSOLUTE: 0.1 K/UL (ref 0–0.6)
EOSINOPHILS RELATIVE PERCENT: 2 %
ESTIMATED AVERAGE GLUCOSE: 177.2 MG/DL
GFR AFRICAN AMERICAN: >60
GFR NON-AFRICAN AMERICAN: >60
GLUCOSE BLD-MCNC: 154 MG/DL (ref 70–99)
HBA1C MFR BLD: 7.8 %
HCT VFR BLD CALC: 44.8 % (ref 36–48)
HDLC SERPL-MCNC: 65 MG/DL (ref 40–60)
HEMOGLOBIN: 14.9 G/DL (ref 12–16)
LDL CHOLESTEROL CALCULATED: 161 MG/DL
LYMPHOCYTES ABSOLUTE: 1.6 K/UL (ref 1–5.1)
LYMPHOCYTES RELATIVE PERCENT: 31.2 %
MCH RBC QN AUTO: 28.2 PG (ref 26–34)
MCHC RBC AUTO-ENTMCNC: 33.4 G/DL (ref 31–36)
MCV RBC AUTO: 84.5 FL (ref 80–100)
MONOCYTES ABSOLUTE: 0.3 K/UL (ref 0–1.3)
MONOCYTES RELATIVE PERCENT: 6.2 %
NEUTROPHILS ABSOLUTE: 3.2 K/UL (ref 1.7–7.7)
NEUTROPHILS RELATIVE PERCENT: 60.1 %
PDW BLD-RTO: 14.8 % (ref 12.4–15.4)
PLATELET # BLD: 224 K/UL (ref 135–450)
PMV BLD AUTO: 8.5 FL (ref 5–10.5)
POTASSIUM SERPL-SCNC: 4.7 MMOL/L (ref 3.5–5.1)
RBC # BLD: 5.3 M/UL (ref 4–5.2)
SODIUM BLD-SCNC: 139 MMOL/L (ref 136–145)
TOTAL PROTEIN: 8 G/DL (ref 6.4–8.2)
TRIGL SERPL-MCNC: 214 MG/DL (ref 0–150)
VITAMIN D 25-HYDROXY: 44.4 NG/ML
VLDLC SERPL CALC-MCNC: 43 MG/DL
WBC # BLD: 5.3 K/UL (ref 4–11)

## 2022-08-31 RX ORDER — ROSUVASTATIN CALCIUM 10 MG/1
10 TABLET, COATED ORAL DAILY
Qty: 30 TABLET | Refills: 5 | Status: SHIPPED | OUTPATIENT
Start: 2022-08-31 | End: 2022-09-12 | Stop reason: SDUPTHER

## 2022-08-31 RX ORDER — PIOGLITAZONEHYDROCHLORIDE 15 MG/1
15 TABLET ORAL DAILY
Qty: 30 TABLET | Refills: 5 | Status: SHIPPED | OUTPATIENT
Start: 2022-08-31

## 2022-09-12 DIAGNOSIS — E78.2 MIXED HYPERLIPIDEMIA: ICD-10-CM

## 2022-09-12 RX ORDER — GLIMEPIRIDE 4 MG/1
TABLET ORAL
Qty: 120 TABLET | Refills: 3 | Status: SHIPPED | OUTPATIENT
Start: 2022-09-12

## 2022-09-12 RX ORDER — ROSUVASTATIN CALCIUM 10 MG/1
10 TABLET, COATED ORAL DAILY
Qty: 90 TABLET | Refills: 3 | Status: SHIPPED | OUTPATIENT
Start: 2022-09-12

## 2022-11-21 DIAGNOSIS — I10 ELEVATED BLOOD PRESSURE READING IN OFFICE WITH DIAGNOSIS OF HYPERTENSION: ICD-10-CM

## 2022-11-21 DIAGNOSIS — I10 BENIGN ESSENTIAL HTN: ICD-10-CM

## 2022-11-21 RX ORDER — LISINOPRIL 10 MG/1
TABLET ORAL
Qty: 90 TABLET | Refills: 3 | Status: SHIPPED | OUTPATIENT
Start: 2022-11-21

## 2022-12-20 ENCOUNTER — TELEPHONE (OUTPATIENT)
Dept: FAMILY MEDICINE CLINIC | Age: 67
End: 2022-12-20

## 2022-12-20 NOTE — TELEPHONE ENCOUNTER
Pt called stating that she can't take her sugar Rx(possibly the Actos?). Pt states that she keeps digging at her head and face ever since starting Rx.  Call back pt [phone]  Routing to [place] due to PCP out of office

## 2022-12-20 NOTE — TELEPHONE ENCOUNTER
Unable to reach patient, Lvm informing to call back to clarify which medication is causing her to itch.

## 2023-01-09 ENCOUNTER — TELEPHONE (OUTPATIENT)
Dept: FAMILY MEDICINE CLINIC | Age: 68
End: 2023-01-09

## 2023-01-09 NOTE — TELEPHONE ENCOUNTER
May stay off the pioglitazone for now. When she is in the office we will check a hemoglobin A1c and then determine whether she needs a replacement medication.

## 2023-01-09 NOTE — TELEPHONE ENCOUNTER
Pt states that she left a message about the actos was causing her to itch. And she was wanting to see if there was anything else that she needed to take in the place of it . And she uses [facility].

## 2023-01-11 ENCOUNTER — OFFICE VISIT (OUTPATIENT)
Dept: FAMILY MEDICINE CLINIC | Age: 68
End: 2023-01-11
Payer: MEDICARE

## 2023-01-11 VITALS
OXYGEN SATURATION: 94 % | BODY MASS INDEX: 43.9 KG/M2 | WEIGHT: 240 LBS | DIASTOLIC BLOOD PRESSURE: 82 MMHG | SYSTOLIC BLOOD PRESSURE: 153 MMHG | HEART RATE: 92 BPM

## 2023-01-11 DIAGNOSIS — Z23 NEEDS FLU SHOT: ICD-10-CM

## 2023-01-11 DIAGNOSIS — E11.9 TYPE 2 DIABETES MELLITUS WITHOUT COMPLICATION, WITHOUT LONG-TERM CURRENT USE OF INSULIN (HCC): ICD-10-CM

## 2023-01-11 DIAGNOSIS — E55.9 VITAMIN D DEFICIENCY: ICD-10-CM

## 2023-01-11 DIAGNOSIS — I10 ELEVATED BLOOD PRESSURE READING IN OFFICE WITH DIAGNOSIS OF HYPERTENSION: Primary | ICD-10-CM

## 2023-01-11 DIAGNOSIS — E78.2 MIXED HYPERLIPIDEMIA: ICD-10-CM

## 2023-01-11 DIAGNOSIS — I10 ESSENTIAL HYPERTENSION: ICD-10-CM

## 2023-01-11 DIAGNOSIS — T50.905A ADVERSE EFFECT OF DRUG, INITIAL ENCOUNTER: ICD-10-CM

## 2023-01-11 DIAGNOSIS — Z23 NEED FOR PNEUMOCOCCAL VACCINATION: ICD-10-CM

## 2023-01-11 PROCEDURE — 1123F ACP DISCUSS/DSCN MKR DOCD: CPT | Performed by: FAMILY MEDICINE

## 2023-01-11 PROCEDURE — 3077F SYST BP >= 140 MM HG: CPT | Performed by: FAMILY MEDICINE

## 2023-01-11 PROCEDURE — 90694 VACC AIIV4 NO PRSRV 0.5ML IM: CPT | Performed by: FAMILY MEDICINE

## 2023-01-11 PROCEDURE — G0009 ADMIN PNEUMOCOCCAL VACCINE: HCPCS | Performed by: FAMILY MEDICINE

## 2023-01-11 PROCEDURE — G0008 ADMIN INFLUENZA VIRUS VAC: HCPCS | Performed by: FAMILY MEDICINE

## 2023-01-11 PROCEDURE — 90677 PCV20 VACCINE IM: CPT | Performed by: FAMILY MEDICINE

## 2023-01-11 PROCEDURE — 36415 COLL VENOUS BLD VENIPUNCTURE: CPT | Performed by: FAMILY MEDICINE

## 2023-01-11 PROCEDURE — 3078F DIAST BP <80 MM HG: CPT | Performed by: FAMILY MEDICINE

## 2023-01-11 PROCEDURE — 99214 OFFICE O/P EST MOD 30 MIN: CPT | Performed by: FAMILY MEDICINE

## 2023-01-11 SDOH — ECONOMIC STABILITY: FOOD INSECURITY: WITHIN THE PAST 12 MONTHS, YOU WORRIED THAT YOUR FOOD WOULD RUN OUT BEFORE YOU GOT MONEY TO BUY MORE.: NEVER TRUE

## 2023-01-11 SDOH — ECONOMIC STABILITY: FOOD INSECURITY: WITHIN THE PAST 12 MONTHS, THE FOOD YOU BOUGHT JUST DIDN'T LAST AND YOU DIDN'T HAVE MONEY TO GET MORE.: NEVER TRUE

## 2023-01-11 ASSESSMENT — PATIENT HEALTH QUESTIONNAIRE - PHQ9
SUM OF ALL RESPONSES TO PHQ QUESTIONS 1-9: 0
SUM OF ALL RESPONSES TO PHQ QUESTIONS 1-9: 0
2. FEELING DOWN, DEPRESSED OR HOPELESS: 0
10. IF YOU CHECKED OFF ANY PROBLEMS, HOW DIFFICULT HAVE THESE PROBLEMS MADE IT FOR YOU TO DO YOUR WORK, TAKE CARE OF THINGS AT HOME, OR GET ALONG WITH OTHER PEOPLE: 0
9. THOUGHTS THAT YOU WOULD BE BETTER OFF DEAD, OR OF HURTING YOURSELF: 0
3. TROUBLE FALLING OR STAYING ASLEEP: 0
4. FEELING TIRED OR HAVING LITTLE ENERGY: 0
SUM OF ALL RESPONSES TO PHQ QUESTIONS 1-9: 0
5. POOR APPETITE OR OVEREATING: 0
7. TROUBLE CONCENTRATING ON THINGS, SUCH AS READING THE NEWSPAPER OR WATCHING TELEVISION: 0
8. MOVING OR SPEAKING SO SLOWLY THAT OTHER PEOPLE COULD HAVE NOTICED. OR THE OPPOSITE, BEING SO FIGETY OR RESTLESS THAT YOU HAVE BEEN MOVING AROUND A LOT MORE THAN USUAL: 0
1. LITTLE INTEREST OR PLEASURE IN DOING THINGS: 0
SUM OF ALL RESPONSES TO PHQ9 QUESTIONS 1 & 2: 0
SUM OF ALL RESPONSES TO PHQ QUESTIONS 1-9: 0
6. FEELING BAD ABOUT YOURSELF - OR THAT YOU ARE A FAILURE OR HAVE LET YOURSELF OR YOUR FAMILY DOWN: 0

## 2023-01-11 ASSESSMENT — SOCIAL DETERMINANTS OF HEALTH (SDOH): HOW HARD IS IT FOR YOU TO PAY FOR THE VERY BASICS LIKE FOOD, HOUSING, MEDICAL CARE, AND HEATING?: NOT HARD AT ALL

## 2023-01-11 NOTE — PROGRESS NOTES
Chief Complaint   Patient presents with    Hypertension    Diabetes        Internal Administration   First Dose      Second Dose           Last COVID Lab SARS-CoV-2 (no units)   Date Value   2022 Positive (A)     SARS-CoV-2, BEAR (no units)   Date Value   2020 NOT DETECTED             Wt Readings from Last 3 Encounters:   23 240 lb (108.9 kg)   22 230 lb (104.3 kg)   22 231 lb (104.8 kg)     BP Readings from Last 3 Encounters:   23 (!) 153/82   22 132/80   22 138/78      Lab Results   Component Value Date    LABA1C 7.8 2022    LABA1C 7.7 2021    LABA1C 7.3 2021       HPI:  Kota Meek is a 74 y.o. (: 1955) here today for    She states that her blood sugars have been running in the 160s fasting. Her blood pressure is quite elevated today in the office will increase her lisinopril to 20mg daily. She admits she has been under a lot of stress lately. She states that after she stopped the actos the itching she was haivng did go away. [] Patient has completed an advance directive  [x] Patient has NOT completed an advanced directive  [] Patient has a documented healthcare surrogate  [x] Patient does NOT have a documented healthcare surrogate  [] Discussed the importance of establishing and updating an advanced directive. Patient has questions at this time and those were answered. [x] Discussed the importance of establishing and updating an advanced directive. Patient does NOT have questions at this time.     Discussed with: [x] Patient            [] Family             [] Other caregiver    Patient's medications, allergies, past medical, surgical, social and family histories were reviewed and updated asappropriate on 2023 at 10:10 AM.    ROS:  Review of Systems    All other systems reviewed and are negative except as noted above on 2023 at 10:10 AM. Additional review of systems may be scanned into the media section ofthis medical record. Any responses requiring further intervention were pursued.     Past Medical History:   Diagnosis Date    Allergic rhinitis     Dizziness 10/20/2020    Hearing loss     HTN (hypertension)     Hyperlipidemia     No history of procedure 16    colonoscopy    Type II or unspecified type diabetes mellitus without mention of complication, not stated as uncontrolled      Family History   Problem Relation Age of Onset    Diabetes Mother     Kidney Disease Mother     Heart Disease Mother     Heart Disease Father     High Blood Pressure Father     Breast Cancer Maternal Aunt      Social History     Socioeconomic History    Marital status:      Spouse name: Not on file    Number of children: Not on file    Years of education: Not on file    Highest education level: Not on file   Occupational History    Not on file   Tobacco Use    Smoking status: Former     Packs/day: 1.00     Years: 8.00     Pack years: 8.00     Types: Cigarettes     Quit date: 1983     Years since quittin.5    Smokeless tobacco: Never    Tobacco comments:     pt has passive smoke exposure - spouse smokes in the house   Vaping Use    Vaping Use: Never used   Substance and Sexual Activity    Alcohol use: No     Alcohol/week: 0.0 standard drinks    Drug use: No    Sexual activity: Yes     Partners: Male   Other Topics Concern    Not on file   Social History Narrative    Not on file     Social Determinants of Health     Financial Resource Strain: Low Risk     Difficulty of Paying Living Expenses: Not hard at all   Food Insecurity: No Food Insecurity    Worried About Running Out of Food in the Last Year: Never true    Ran Out of Food in the Last Year: Never true   Transportation Needs: Not on file   Physical Activity: Insufficiently Active    Days of Exercise per Week: 2 days    Minutes of Exercise per Session: 10 min   Stress: Not on file   Social Connections: Not on file   Intimate Partner Violence: Not on file   Housing Stability: Not on file     Prior to Visit Medications    Medication Sig Taking? Authorizing Provider   lisinopril (PRINIVIL;ZESTRIL) 10 MG tablet TAKE ONE TABLET BY MOUTH DAILY Yes Janis Hines MD   rosuvastatin (CRESTOR) 10 MG tablet Take 1 tablet by mouth daily Yes Janis Hines MD   glimepiride (AMARYL) 4 MG tablet Take 1 tablet by mouth twice a day Yes Janis Hines MD   JARDIANCE 25 MG tablet TAKE ONE TABLET BY MOUTH DAILY Yes Janis Hines MD   vitamin D-3 (CHOLECALCIFEROL) 5000 units TABS Take 1 tablet by mouth daily  Patient taking differently: Take 5,000 Units by mouth daily 2-3 times a week Yes Janis Hines MD   Omega-3 Fatty Acids (OMEGA 3 PO) Take by mouth Yes Historical Provider, MD   fluticasone (FLONASE) 50 MCG/ACT nasal spray In each nostril daily Yes Janis Hines MD   cetirizine (ZYRTEC ALLERGY) 10 MG tablet Take 1 tablet by mouth daily Yes Janis Hines MD   calcium carbonate (OSCAL) 500 MG TABS tablet Take 2 tablets by mouth daily Yes Janis Hines MD   aspirin 81 MG tablet Take 162 mg by mouth daily  Yes Historical Provider, MD     Allergies   Allergen Reactions    Actos [Pioglitazone] Itching    Codeine     Fenofibrate      Skin turns red and get hives    Metformin And Related      She states it caused joint pain. She tried it 3 times and had same reaction. OBJECTIVE:  Estimated body mass index is 43.9 kg/m² as calculated from the following:    Height as of 7/20/21: 5' 2\" (1.575 m). Weight as of this encounter: 240 lb (108.9 kg). Vitals:    01/11/23 1003 01/11/23 1004   BP: (!) 183/76 (!) 153/82   Pulse: 92    SpO2: 94%    Weight: 240 lb (108.9 kg)        Physical Exam  Vitals and nursing note reviewed. Constitutional:       General: She is not in acute distress. Appearance: She is well-developed. She is not diaphoretic. HENT:      Head: Normocephalic and atraumatic.       Right Ear: External ear normal.      Left Ear: External ear normal.      Nose: Nose normal.   Eyes:      General: Lids are normal. No scleral icterus. Right eye: No discharge. Left eye: No discharge. Pupils: Pupils are equal, round, and reactive to light. Neck:      Thyroid: No thyromegaly. Vascular: No JVD. Cardiovascular:      Rate and Rhythm: Normal rate and regular rhythm. Heart sounds: Normal heart sounds. Pulmonary:      Effort: Pulmonary effort is normal. No respiratory distress. Breath sounds: Normal breath sounds. Abdominal:      Palpations: Abdomen is soft. There is no hepatomegaly or splenomegaly. Tenderness: There is no abdominal tenderness. Musculoskeletal:      Right lower leg: No edema. Left lower leg: No edema. Skin:     General: Skin is warm and dry. Coloration: Skin is not pale. Findings: No erythema or rash. Comments: Turgor normal   Neurological:      Mental Status: She is oriented to person, place, and time. Psychiatric:         Mood and Affect: Mood normal.         Behavior: Behavior normal.         Thought Content: Thought content normal.         Judgment: Judgment normal.            ASSESSMENT PLAN      Diagnosis Orders   1. Elevated blood pressure reading in office with diagnosis of hypertension        2. Type 2 diabetes mellitus without complication, without long-term current use of insulin (HCC)  Hemoglobin A1C      3. Need for pneumococcal vaccination  Pneumococcal, PCV20, PREVNAR 21, (age 25 yrs+), IM, PF      4. Needs flu shot  Influenza, FLUAD, (age 72 y+), IM, Preservative Free, 0.5 mL      5. Essential hypertension        6. Vitamin D deficiency        7. Mixed hyperlipidemia        8. Adverse effect of drug, initial encounter        Patient states that she is under more stress. This may be contributing to the blood pressure elevation. Increasing lisinopril to 20 mg daily.   Call in 2 to 3 weeks if numbers are consistently running over 943 systolic. Check A1c determine if medication needed to replace Actos which she stated caused itching of her scalp and face. Itching has resolved since she stopped the Actos. She is having no symptoms of elevated sugar. Vitamin D levels will be monitored as needed and changes to medications related to vitamin D as listed in the medication list will be changed to maintain parameters as needed. Lipids will be monitored based upon levels requiring treatment and other cardiac risks. Medications for hyperlipidemia and hypertriglyceridemia as listed on the medication list will be changed as necessary to reach control parameters. Follow-up 4 months    Patient should call the office immediately with new or ongoing signs or symptoms or worsening, or proceed to the emergency room. No changes in past medical history, past surgical history, social history, or family history were noted during the patient encounter unless specifically listed above. All updates of past medical history, past surgical history, social history, or family history were reviewed personally by me during the office visit. All problems listed in the assessment are stable unless noted otherwise. Medication profile reviewed personally by me during the visit. Medication side effects and possible impairments from medications were discussed as applicable. Unless stated otherwise, we will continue current medications as listed in the medication review report contained in the patient's medical record. This document was prepared by a combination of typing and transcription through a voice recognition software.                 Scribe attestation: Halima Sarah RN, am scribing for and in the presence of Charlotte Stapleton MD. Electronically signed by Milly Bird RN on 1/11/2023 at 10:10 AM      Provider attestation:     I, Dr. Lauri Zaragoza, personally performed the services described in this documentation, as scribed by the above signed scribe in my presence, and it is both accurate and complete. I agree with the ROS and Past Histories independently gathered by the clinical support staff and the remaining scribed note accurately describes my personal service to the patient.       1/11/2023    10:24 AM

## 2023-01-11 NOTE — PATIENT INSTRUCTIONS

## 2023-01-12 LAB
ESTIMATED AVERAGE GLUCOSE: 177.2 MG/DL
HBA1C MFR BLD: 7.8 %

## 2023-01-13 NOTE — TELEPHONE ENCOUNTER
----- Message from Leola Hannon MD sent at 1/13/2023  8:25 AM EST -----  Sugar control not at goal.  Add semaglutide 3 mg daily. Continue other meds the same.   Repeat A1c 3 months

## 2023-01-13 NOTE — TELEPHONE ENCOUNTER
Patient advised on message. Voiced understanding.   Please send Rx to [facility]/cooperative/family/SO at bedside

## 2023-02-09 DIAGNOSIS — I10 BENIGN ESSENTIAL HTN: ICD-10-CM

## 2023-02-09 DIAGNOSIS — I10 ELEVATED BLOOD PRESSURE READING IN OFFICE WITH DIAGNOSIS OF HYPERTENSION: ICD-10-CM

## 2023-02-09 RX ORDER — LISINOPRIL 20 MG/1
TABLET ORAL
Qty: 90 TABLET | Refills: 0 | Status: SHIPPED | OUTPATIENT
Start: 2023-02-09

## 2023-02-09 NOTE — TELEPHONE ENCOUNTER
Pt called stating that at her last appt the lisinopril was increased to 20mg daily. Rx was not sent to pharmacy and pt is out. Please advise.   Routing to Kristian Rowland due to PCP out of office

## 2023-02-13 ENCOUNTER — TELEPHONE (OUTPATIENT)
Dept: FAMILY MEDICINE CLINIC | Age: 68
End: 2023-02-13

## 2023-02-13 DIAGNOSIS — E11.9 TYPE 2 DIABETES MELLITUS WITHOUT COMPLICATION, WITHOUT LONG-TERM CURRENT USE OF INSULIN (HCC): Primary | ICD-10-CM

## 2023-02-13 RX ORDER — LANCETS 30 GAUGE
1 EACH MISCELLANEOUS 2 TIMES DAILY
Qty: 100 EACH | Refills: 5 | Status: SHIPPED | OUTPATIENT
Start: 2023-02-13

## 2023-02-13 RX ORDER — BLOOD-GLUCOSE METER
EACH MISCELLANEOUS
Qty: 1 EACH | Refills: 0 | Status: SHIPPED | OUTPATIENT
Start: 2023-02-13

## 2023-02-13 NOTE — TELEPHONE ENCOUNTER
Pt called stating that with her insurance she's eligible for a new glucose meter and supplies. Pt would like to get this sent to [facility] if possible.  Call back pt [phone]

## 2023-04-04 ENCOUNTER — TELEPHONE (OUTPATIENT)
Dept: PHARMACY | Facility: CLINIC | Age: 68
End: 2023-04-04

## 2023-04-04 NOTE — LETTER
[place]  [address], [address]   [address] [facility] [address] [address]           04/04/23     Dear Michael Mulligan,    We tried to reach you recently regarding your RYBELSUS TAB 3MG daily, but were unable to reach you on the telephone. We have on file that you are currently taking RYBELSUS TAB 3MG. If you are no longer taking or taking differently, please call us at the number below so that we can discuss this and update your medication profile. It appears that this medication has not been filled at proper times. We are worried you might be missing doses or not taking it as directed. It is important that you take your medications regularly and try not to miss a single dose.     Some ways to help you remember to take and refill your medications are to:  Use a pill box, set an alarm, and/or keep your medication near something that you do every day  Fill a 3-month supply of your prescription at a time to save you time and trips to the pharmacy - if you would like assistance in switching your prescriptions to a 3-month supply, please contact us  Ask your pharmacy if they participate in [facility]", a program where you can  all of your medications on the same day  Ask your pharmacy if you can be set up with automatic refill, where they will automatically refill your prescription when it is due and let you know it's ready to     Sincerely,   [address] // Department, toll free [phone], Option 3

## 2023-04-04 NOTE — TELEPHONE ENCOUNTER
04/27/22 Office Visit MD Janes Mckeon   11/02/21 Office Visit MD Janes Mckeon [place] recent visits within past 540 days with a meds authorizing provider and meeting all other requirements  Future Appointments  Date Type Provider Dept   05/10/23 Appointment MD Janes Mckeon [place] future appointments within next 150 days with a meds authorizing provider and meeting all other requirements    Future Appointments   Date Time Provider Garrett Jimenez   4/11/2023 10:00 AM SCHEDULE, JANES [facility] [place] [facility] [place]   5/10/2023 11:20 AM Palak Umana MD [address] [place] [address] // Department, toll free [phone], Option 1    For Pharmacy Admin Tracking Only    Program: [address] place:  No  Gap Closed?: No   Time Spent (min): 15

## 2023-05-09 SDOH — ECONOMIC STABILITY: HOUSING INSECURITY
IN THE LAST 12 MONTHS, WAS THERE A TIME WHEN YOU DID NOT HAVE A STEADY PLACE TO SLEEP OR SLEPT IN A SHELTER (INCLUDING NOW)?: NO

## 2023-05-09 SDOH — ECONOMIC STABILITY: FOOD INSECURITY: WITHIN THE PAST 12 MONTHS, THE FOOD YOU BOUGHT JUST DIDN'T LAST AND YOU DIDN'T HAVE MONEY TO GET MORE.: NEVER TRUE

## 2023-05-09 SDOH — ECONOMIC STABILITY: INCOME INSECURITY: HOW HARD IS IT FOR YOU TO PAY FOR THE VERY BASICS LIKE FOOD, HOUSING, MEDICAL CARE, AND HEATING?: NOT HARD AT ALL

## 2023-05-09 SDOH — ECONOMIC STABILITY: FOOD INSECURITY: WITHIN THE PAST 12 MONTHS, YOU WORRIED THAT YOUR FOOD WOULD RUN OUT BEFORE YOU GOT MONEY TO BUY MORE.: NEVER TRUE

## 2023-05-10 ENCOUNTER — OFFICE VISIT (OUTPATIENT)
Dept: FAMILY MEDICINE CLINIC | Age: 68
End: 2023-05-10
Payer: MEDICARE

## 2023-05-10 VITALS
BODY MASS INDEX: 43.53 KG/M2 | WEIGHT: 238 LBS | OXYGEN SATURATION: 97 % | DIASTOLIC BLOOD PRESSURE: 82 MMHG | SYSTOLIC BLOOD PRESSURE: 124 MMHG | HEART RATE: 88 BPM

## 2023-05-10 DIAGNOSIS — E55.9 VITAMIN D DEFICIENCY: ICD-10-CM

## 2023-05-10 DIAGNOSIS — E78.2 MIXED HYPERLIPIDEMIA: ICD-10-CM

## 2023-05-10 DIAGNOSIS — I10 ESSENTIAL HYPERTENSION: ICD-10-CM

## 2023-05-10 DIAGNOSIS — E11.9 TYPE 2 DIABETES MELLITUS WITHOUT COMPLICATION, WITHOUT LONG-TERM CURRENT USE OF INSULIN (HCC): Primary | ICD-10-CM

## 2023-05-10 PROCEDURE — 3074F SYST BP LT 130 MM HG: CPT | Performed by: FAMILY MEDICINE

## 2023-05-10 PROCEDURE — 36415 COLL VENOUS BLD VENIPUNCTURE: CPT | Performed by: FAMILY MEDICINE

## 2023-05-10 PROCEDURE — 99214 OFFICE O/P EST MOD 30 MIN: CPT | Performed by: FAMILY MEDICINE

## 2023-05-10 PROCEDURE — 3051F HG A1C>EQUAL 7.0%<8.0%: CPT | Performed by: FAMILY MEDICINE

## 2023-05-10 PROCEDURE — 3079F DIAST BP 80-89 MM HG: CPT | Performed by: FAMILY MEDICINE

## 2023-05-10 PROCEDURE — 1123F ACP DISCUSS/DSCN MKR DOCD: CPT | Performed by: FAMILY MEDICINE

## 2023-05-10 RX ORDER — GLIMEPIRIDE 4 MG/1
TABLET ORAL
Qty: 180 TABLET | Refills: 2 | Status: SHIPPED | OUTPATIENT
Start: 2023-05-10

## 2023-05-10 NOTE — PATIENT INSTRUCTIONS

## 2023-05-10 NOTE — TELEPHONE ENCOUNTER
Sarbjit Nix is requesting refill(s)   Last OV 1/11/23 (pertaining to medication)  LR 9/12/22 (per medication requested)  Next office visit scheduled or attempted Yes   If no, reason:  8/14/23

## 2023-05-10 NOTE — PROGRESS NOTES
Ran Out of Food in the Last Year: Never true   Transportation Needs: Unknown    Lack of Transportation (Medical): Not on file    Lack of Transportation (Non-Medical): No   Physical Activity: Insufficiently Active    Days of Exercise per Week: 2 days    Minutes of Exercise per Session: 10 min   Stress: Not on file   Social Connections: Not on file   Intimate Partner Violence: Not on file   Housing Stability: Unknown    Unable to Pay for Housing in the Last Year: Not on file    Number of Places Lived in the Last Year: Not on file    Unstable Housing in the Last Year: No     Prior to Visit Medications    Medication Sig Taking? Authorizing Provider   JARDIANCE 25 MG tablet TAKE ONE TABLET BY MOUTH DAILY Yes Carrie Bender MD   Blood Glucose Monitoring Suppl (EASY STEP GLUCOSE MONITOR) SAGE Please provide with glucose monitor and kit Yes Carrie Bender MD   blood glucose test strips (ASCENSIA AUTODISC VI;ONE TOUCH ULTRA TEST VI) strip 1 each by In Vitro route daily As needed.  Yes Carrie Bender MD   Lancets MISC 1 each by Does not apply route 2 times daily Yes Carrie Bender MD   lisinopril (PRINIVIL;ZESTRIL) 20 MG tablet TAKE ONE TABLET BY MOUTH DAILY Yes Jamil Cui, APRN - CNP   rosuvastatin (CRESTOR) 10 MG tablet Take 1 tablet by mouth daily Yes Carrie Bender MD   glimepiride (AMARYL) 4 MG tablet Take 1 tablet by mouth twice a day Yes Carrie Bender MD   vitamin D-3 (CHOLECALCIFEROL) 5000 units TABS Take 1 tablet by mouth daily  Patient taking differently: Take 1 tablet by mouth daily 2-3 times a week Yes Carrie Bender MD   Omega-3 Fatty Acids (OMEGA 3 PO) Take by mouth Yes Historical Provider, MD   fluticasone (FLONASE) 50 MCG/ACT nasal spray In each nostril daily Yes Carire Bender MD   cetirizine (ZYRTEC ALLERGY) 10 MG tablet Take 1 tablet by mouth daily Yes Carrie Bender MD   calcium carbonate (OSCAL)

## 2023-05-11 DIAGNOSIS — I10 ELEVATED BLOOD PRESSURE READING IN OFFICE WITH DIAGNOSIS OF HYPERTENSION: ICD-10-CM

## 2023-05-11 DIAGNOSIS — I10 BENIGN ESSENTIAL HTN: ICD-10-CM

## 2023-05-11 LAB
EST. AVERAGE GLUCOSE BLD GHB EST-MCNC: 197.3 MG/DL
HBA1C MFR BLD: 8.5 %

## 2023-05-11 RX ORDER — LISINOPRIL 20 MG/1
TABLET ORAL
Qty: 90 TABLET | Refills: 0 | Status: SHIPPED | OUTPATIENT
Start: 2023-05-11

## 2023-07-31 DIAGNOSIS — I10 BENIGN ESSENTIAL HTN: ICD-10-CM

## 2023-07-31 DIAGNOSIS — I10 ELEVATED BLOOD PRESSURE READING IN OFFICE WITH DIAGNOSIS OF HYPERTENSION: ICD-10-CM

## 2023-07-31 RX ORDER — LISINOPRIL 20 MG/1
20 TABLET ORAL DAILY
Qty: 90 TABLET | Refills: 0 | Status: SHIPPED | OUTPATIENT
Start: 2023-07-31

## 2023-07-31 NOTE — TELEPHONE ENCOUNTER
Refill Request     CONFIRM preferrred pharmacy with the patient. If Mail Order Rx - Pend for 90 day refill. Last Seen: Last Seen Department: 5/10/2023  Last Seen by PCP: 5/10/2023    Last Written: 05/11/2023      If no future appointment scheduled, route STAFF MESSAGE with patient name to the [facility] for scheduling. Next Appointment:   Future Appointments   Date Time Provider [address] [place] [address]   8/14/2023  9:40 AM [facility] [place] [facility] [place]       Message sent to  to schedule appt with patient?   N/A      Requested Prescriptions     Pending Prescriptions Disp Refills    lisinopril (PRINIVIL;ZESTRIL) 20 MG tablet 90 tablet 0     Sig: Take 1 tablet by mouth daily

## 2023-09-12 ENCOUNTER — TELEPHONE (OUTPATIENT)
Dept: PHARMACY | Facility: CLINIC | Age: 68
End: 2023-09-12

## 2023-09-12 DIAGNOSIS — E78.2 MIXED HYPERLIPIDEMIA: ICD-10-CM

## 2023-09-12 RX ORDER — ROSUVASTATIN CALCIUM 10 MG/1
10 TABLET, COATED ORAL DAILY
Qty: 90 TABLET | Refills: 3 | Status: SHIPPED | OUTPATIENT
Start: 2023-09-12

## 2023-09-12 NOTE — TELEPHONE ENCOUNTER
Refill Request     CONFIRM preferrred pharmacy with the patient. If Mail Order Rx - Pend for 90 day refill. Last Seen: Last Seen Department: 5/10/2023  Last Seen by PCP: 5/10/2023    Last Written: 09/12/2022    If no future appointment scheduled, route STAFF MESSAGE with patient name to the [facility] for scheduling. Next Appointment:   Future Appointments   Date Time Provider [address] [place] [address]   12/12/2023 10:40 AM Esperanza Goode [facility] [place] [facility] [place]       Message sent to  to schedule appt with patient?   N/A      Requested Prescriptions     Pending Prescriptions Disp Refills    rosuvastatin (CRESTOR) 10 MG tablet [Pharmacy Med Name: ROSUVASTATIN CALCIUM 10 MG TAB] 90 tablet 3     Sig: TAKE ONE TABLET BY MOUTH DAILY

## 2023-09-12 NOTE — TELEPHONE ENCOUNTER
POPULATION HEALTH CLINICAL PHARMACY: ADHERENCE REVIEW  Identified care gap per [place]: fills at [facility]: Statin adherence    [facility] [id number] - [facility], [place] - [address] [facility] -  [phone] Josephine Norma [phone] [address] [facility] [address]  Phone: [phone] Fax: [phone]      Patient also appears to be prescribed: ACE/ARB, Diabetes, and Statin     Current Outpatient Medications   Medication Instructions    aspirin 162 mg, Oral, DAILY    Blood Glucose Monitoring Suppl (EASY STEP GLUCOSE MONITOR) SAGE Please provide with glucose monitor and kit    blood glucose test strips (ASCENSIA AUTODISC VI;ONE TOUCH ULTRA TEST VI) strip 1 each, In Vitro, DAILY, As needed. calcium carbonate (OSCAL) 1,000 mg, Oral, DAILY    cetirizine (ZYRTEC ALLERGY) 10 mg, Oral, DAILY    fluticasone (FLONASE) 50 MCG/ACT nasal spray In each nostril daily    glimepiride (AMARYL) 4 MG tablet TAKE ONE TABLET BY MOUTH TWICE A DAY    JARDIANCE 25 MG tablet TAKE ONE TABLET BY MOUTH DAILY    Lancets MISC 1 each, Does not apply, 2 TIMES DAILY    lisinopril (PRINIVIL;ZESTRIL) 20 mg, Oral, DAILY    Omega-3 Fatty Acids (OMEGA 3 PO) Oral    rosuvastatin (CRESTOR) 10 mg, Oral, DAILY    Semaglutide 3 mg, Oral, DAILY    vitamin D-3 (CHOLECALCIFEROL) 5,000 Units, Oral, DAILY         ASSESSMENT    DIABETES ADHERENCE  Insurance Records claims through 8/28/23  YTD [address] = 84.45%; Potential Fail Date: 10/5/23:   Destinee Brito last filled for 30 days supply on 7/31/23. Next refill due: 8/30/23    Per Reconciled Dispense Report:  JARDIANCE 25MG TAB 07/31/2023 30 30 tablet Norma Govea MD [facility] [id number]. .. JARDIANCE 25MG TAB 05/10/2023 30 30 tablet Norma Govea MD [facility] [id number]. .. JARDIANCE 25MG TAB 02/13/2023 30 30 tablet Norma Govea MD [facility] 950205. .. JARDIANCE 25MG TAB 01/03/2023 30 30 tablet Norma Govea MD [facility] [id number]. ..        GLIMEPIRIDE 4MG

## 2023-09-29 ENCOUNTER — TELEPHONE (OUTPATIENT)
Dept: FAMILY MEDICINE CLINIC | Age: 68
End: 2023-09-29

## 2023-09-29 DIAGNOSIS — I10 BENIGN ESSENTIAL HTN: ICD-10-CM

## 2023-09-29 DIAGNOSIS — I10 ELEVATED BLOOD PRESSURE READING IN OFFICE WITH DIAGNOSIS OF HYPERTENSION: ICD-10-CM

## 2023-09-29 RX ORDER — LISINOPRIL 20 MG/1
20 TABLET ORAL DAILY
Qty: 90 TABLET | Refills: 0 | Status: SHIPPED | OUTPATIENT
Start: 2023-09-29

## 2023-09-29 NOTE — TELEPHONE ENCOUNTER
Pt called stating that she can't afford the Jardiance. States that at first it was $42, then $68, was told it was going to be $104 this time, but just got off the phone with the pharmacy and cost is $164. Pt is wanting to know if there's anything that is Tier 1 or Tier 2 that PCP would be able to replace Rx with due to cost. Pt uses [facility].  Call back [phone]  Routing to Edgar Bills due to PCP out of office

## 2023-09-29 NOTE — TELEPHONE ENCOUNTER
Refill Request     CONFIRM preferrred pharmacy with the patient. If Mail Order Rx - Pend for 90 day refill. Last Seen: Last Seen Department: 5/10/2023  Last Seen by PCP: 5/10/2023    Last Written: 7/31/2023    If no future appointment scheduled, route STAFF MESSAGE with patient name to the [facility] for scheduling. Next Appointment:   Future Appointments   Date Time Provider [address] [place] [address]   12/12/2023 10:40 AM [facility]iot[facility] [place] [facility] [place]       Message sent to  to schedule appt with patient?   NO      Requested Prescriptions     Pending Prescriptions Disp Refills    lisinopril (PRINIVIL;ZESTRIL) 20 MG tablet [Pharmacy Med Name: LISINOPRIL 20 MG TABLET] 90 tablet 0     Sig: TAKE 1 TABLET BY MOUTH DAILY

## 2023-09-29 NOTE — TELEPHONE ENCOUNTER
Spoke with patient. Signed patient up for Jardiance coupons,  Number given to see if she can apply for PAP.   Patient will also call her insurance to what would be covered if she can't get Jardiance any cheaper

## 2023-09-29 NOTE — TELEPHONE ENCOUNTER
Patient should check with her insurance to see what else in that class of medications is covered by her insurance and then let us know.   Also is she thinks she might qualify for patient assistance then please supply her with the forms

## 2023-10-31 ENCOUNTER — NURSE ONLY (OUTPATIENT)
Dept: FAMILY MEDICINE CLINIC | Age: 68
End: 2023-10-31
Payer: MEDICARE

## 2023-10-31 PROCEDURE — 90694 VACC AIIV4 NO PRSRV 0.5ML IM: CPT | Performed by: FAMILY MEDICINE

## 2023-10-31 PROCEDURE — G0008 ADMIN INFLUENZA VIRUS VAC: HCPCS | Performed by: FAMILY MEDICINE

## 2023-11-27 RX ORDER — EMPAGLIFLOZIN 25 MG/1
TABLET, FILM COATED ORAL
Qty: 30 TABLET | Refills: 2 | Status: SHIPPED | OUTPATIENT
Start: 2023-11-27

## 2023-11-27 NOTE — TELEPHONE ENCOUNTER
Refill Request     CONFIRM preferrred pharmacy with the patient. If Mail Order Rx - Pend for 90 day refill. Last Seen: Last Seen Department: 5/10/2023  Last Seen by PCP: 5/10/2023    Last Written: 4/10/23    If no future appointment scheduled, route STAFF MESSAGE with patient name to the [facility] for scheduling. Next Appointment:   Future Appointments   Date Time Provider [address] [place] [address]   12/12/2023 10:40 AM Arcadio Miller MD [facility] [place]       Message sent to [address] [place] [address] to schedule appt with patient?   N/A      Requested Prescriptions     Pending Prescriptions Disp Refills    JARDIANCE 25 MG tablet [Pharmacy Med Name: Rowe Ansari 25 MG TABLET] 30 tablet 2     Sig: TAKE ONE TABLET BY MOUTH DAILY

## 2023-12-04 ENCOUNTER — TELEPHONE (OUTPATIENT)
Dept: FAMILY MEDICINE CLINIC | Age: 68
End: 2023-12-04

## 2023-12-04 DIAGNOSIS — D49.3 NEOPLASM OF UNSPECIFIED BEHAVIOR OF BREAST: ICD-10-CM

## 2023-12-04 DIAGNOSIS — S20.02XA: Primary | ICD-10-CM

## 2023-12-04 NOTE — TELEPHONE ENCOUNTER
Patient is scheduling a yearly mammogram and has 3 patches on L breasts, one is purple and bluish and the other grey and bluish. Requesting order for diagnostic mammo and ultrasound  to be done at [facility]. I#[phone]. Please call patient and advise when the orders are placed.

## 2023-12-27 ENCOUNTER — HOSPITAL ENCOUNTER (OUTPATIENT)
Dept: MAMMOGRAPHY | Age: 68
Discharge: HOME | End: 2023-12-27
Payer: MEDICARE

## 2023-12-27 ENCOUNTER — HOSPITAL ENCOUNTER (OUTPATIENT)
Dept: ULTRASOUND IMAGING | Age: 68
Discharge: HOME | End: 2023-12-27
Payer: MEDICARE

## 2023-12-27 DIAGNOSIS — S20.02XA: ICD-10-CM

## 2023-12-27 DIAGNOSIS — D49.3 NEOPLASM OF UNSPECIFIED BEHAVIOR OF BREAST: ICD-10-CM

## 2023-12-27 DIAGNOSIS — S20.02XA: Primary | ICD-10-CM

## 2023-12-27 PROCEDURE — G0279 TOMOSYNTHESIS, MAMMO: HCPCS

## 2024-01-03 ENCOUNTER — OFFICE VISIT (OUTPATIENT)
Dept: FAMILY MEDICINE CLINIC | Age: 69
End: 2024-01-03
Payer: MEDICARE

## 2024-01-03 VITALS
WEIGHT: 224 LBS | HEART RATE: 77 BPM | SYSTOLIC BLOOD PRESSURE: 148 MMHG | DIASTOLIC BLOOD PRESSURE: 82 MMHG | BODY MASS INDEX: 40.97 KG/M2 | OXYGEN SATURATION: 97 %

## 2024-01-03 DIAGNOSIS — E11.9 TYPE 2 DIABETES MELLITUS WITHOUT COMPLICATION, WITHOUT LONG-TERM CURRENT USE OF INSULIN (HCC): Primary | ICD-10-CM

## 2024-01-03 DIAGNOSIS — I77.1 SUBCLAVIAN ARTERY STENOSIS, LEFT (HCC): ICD-10-CM

## 2024-01-03 DIAGNOSIS — E78.2 MIXED HYPERLIPIDEMIA: ICD-10-CM

## 2024-01-03 DIAGNOSIS — I10 BENIGN ESSENTIAL HTN: ICD-10-CM

## 2024-01-03 DIAGNOSIS — K59.01 SLOW TRANSIT CONSTIPATION: ICD-10-CM

## 2024-01-03 DIAGNOSIS — E55.9 VITAMIN D DEFICIENCY: ICD-10-CM

## 2024-01-03 DIAGNOSIS — L81.9 DISCOLORATION OF SKIN: ICD-10-CM

## 2024-01-03 DIAGNOSIS — I10 ESSENTIAL HYPERTENSION: ICD-10-CM

## 2024-01-03 LAB
BASOPHILS # BLD: 0.1 K/UL (ref 0–0.2)
BASOPHILS NFR BLD: 1.3 %
DEPRECATED RDW RBC AUTO: 15.4 % (ref 12.4–15.4)
EOSINOPHIL # BLD: 0.1 K/UL (ref 0–0.6)
EOSINOPHIL NFR BLD: 1.8 %
HCT VFR BLD AUTO: 45.7 % (ref 36–48)
HGB BLD-MCNC: 15.1 G/DL (ref 12–16)
LYMPHOCYTES # BLD: 1.9 K/UL (ref 1–5.1)
LYMPHOCYTES NFR BLD: 29.1 %
MCH RBC QN AUTO: 27.6 PG (ref 26–34)
MCHC RBC AUTO-ENTMCNC: 33.1 G/DL (ref 31–36)
MCV RBC AUTO: 83.5 FL (ref 80–100)
MONOCYTES # BLD: 0.5 K/UL (ref 0–1.3)
MONOCYTES NFR BLD: 7.5 %
NEUTROPHILS # BLD: 3.8 K/UL (ref 1.7–7.7)
NEUTROPHILS NFR BLD: 60.3 %
PLATELET # BLD AUTO: 248 K/UL (ref 135–450)
PMV BLD AUTO: 8.6 FL (ref 5–10.5)
RBC # BLD AUTO: 5.48 M/UL (ref 4–5.2)
WBC # BLD AUTO: 6.4 K/UL (ref 4–11)

## 2024-01-03 PROCEDURE — 99214 OFFICE O/P EST MOD 30 MIN: CPT | Performed by: FAMILY MEDICINE

## 2024-01-03 PROCEDURE — 3079F DIAST BP 80-89 MM HG: CPT | Performed by: FAMILY MEDICINE

## 2024-01-03 PROCEDURE — 3077F SYST BP >= 140 MM HG: CPT | Performed by: FAMILY MEDICINE

## 2024-01-03 PROCEDURE — 36415 COLL VENOUS BLD VENIPUNCTURE: CPT | Performed by: FAMILY MEDICINE

## 2024-01-03 PROCEDURE — 1123F ACP DISCUSS/DSCN MKR DOCD: CPT | Performed by: FAMILY MEDICINE

## 2024-01-03 NOTE — PROGRESS NOTES
Chief Complaint   Patient presents with    Diabetes        Internal Administration   First Dose      Second Dose           Last COVID Lab SARS-CoV-2 (no units)   Date Value   2022 Positive (A)     SARS-CoV-2, BEAR (no units)   Date Value   2020 NOT DETECTED     POC Glucose (mg/dl)   Date Value   10/23/2020 196 (H)             Wt Readings from Last 3 Encounters:   05/10/23 108 kg (238 lb)   23 108.9 kg (240 lb)   22 104.3 kg (230 lb)     BP Readings from Last 3 Encounters:   05/10/23 124/82   23 (!) 153/82   22 132/80      Lab Results   Component Value Date    LABA1C 8.5 05/10/2023    LABA1C 7.8 2023    LABA1C 7.8 2022       HPI:  Kaila Gonzalez is a 68 y.o. (: 1955) here today for    Follow up on chronic conditions.     States her BS have been stable. She would like to stop the Jardiance due to cost. States she checked with her insurance and there is one alternative and its still to expensive. Will stop jardiance and increase simaglutide from 3mg to 7mg.     Continues to have some breast discoloration. Referral to breast specialist placed today.     [x] Patient has completed an advance directive  [] Patient has NOT completed an advanced directive  [] Patient has a documented healthcare surrogate  [] Patient does NOT have a documented healthcare surrogate  [] Discussed the importance of establishing and updating an advanced directive.  Patient has questions at this time and those were answered.  [] Discussed the importance of establishing and updating an advanced directive.  Patient does NOT have questions at this time.    Discussed with: [x] Patient            [] Family             [] Other caregiver    Patient's medications, allergies, past medical, surgical, social and family histories were reviewed and updated asappropriate on 1/3/2024 at 10:47 AM.    ROS:  Review of Systems    All other systems reviewed and are negative except as noted above on 1/3/2024 at

## 2024-01-04 LAB
25(OH)D3 SERPL-MCNC: 43.6 NG/ML
ALBUMIN SERPL-MCNC: 4.7 G/DL (ref 3.4–5)
ALBUMIN/GLOB SERPL: 1.6 {RATIO} (ref 1.1–2.2)
ALP SERPL-CCNC: 89 U/L (ref 40–129)
ALT SERPL-CCNC: 42 U/L (ref 10–40)
ANION GAP SERPL CALCULATED.3IONS-SCNC: 13 MMOL/L (ref 3–16)
AST SERPL-CCNC: 32 U/L (ref 15–37)
BILIRUB SERPL-MCNC: 0.3 MG/DL (ref 0–1)
BUN SERPL-MCNC: 19 MG/DL (ref 7–20)
CALCIUM SERPL-MCNC: 9.2 MG/DL (ref 8.3–10.6)
CHLORIDE SERPL-SCNC: 103 MMOL/L (ref 99–110)
CHOLEST SERPL-MCNC: 353 MG/DL (ref 0–199)
CO2 SERPL-SCNC: 23 MMOL/L (ref 21–32)
CREAT SERPL-MCNC: 0.8 MG/DL (ref 0.6–1.2)
EST. AVERAGE GLUCOSE BLD GHB EST-MCNC: 174.3 MG/DL
GFR SERPLBLD CREATININE-BSD FMLA CKD-EPI: >60 ML/MIN/{1.73_M2}
GLUCOSE SERPL-MCNC: 153 MG/DL (ref 70–99)
HBA1C MFR BLD: 7.7 %
HDLC SERPL-MCNC: 55 MG/DL (ref 40–60)
LDLC SERPL CALC-MCNC: 239 MG/DL
POTASSIUM SERPL-SCNC: 4.3 MMOL/L (ref 3.5–5.1)
PROT SERPL-MCNC: 7.7 G/DL (ref 6.4–8.2)
SODIUM SERPL-SCNC: 139 MMOL/L (ref 136–145)
TRIGL SERPL-MCNC: 294 MG/DL (ref 0–150)
TSH SERPL DL<=0.005 MIU/L-ACNC: 1.61 UIU/ML (ref 0.27–4.2)
VLDLC SERPL CALC-MCNC: 59 MG/DL

## 2024-01-08 ENCOUNTER — TELEPHONE (OUTPATIENT)
Dept: FAMILY MEDICINE CLINIC | Age: 69
End: 2024-01-08

## 2024-01-08 NOTE — TELEPHONE ENCOUNTER
In the last note my understanding was that she did tolerate semaglutide 3 mg?  The issue arose because she want to stop Jardiance and we had to change medications and the choice was to try the 7 mg.  If she does not think that she can tolerate the higher dose of semaglutide we likely will need to start insulin

## 2024-01-08 NOTE — TELEPHONE ENCOUNTER
Pt states thst we sent Semaglutide 7 mg to the pharmacy. She states that she picked it up and when she got home she realized what it was. States that she tried it once before with the 3 mg and she can not take it. States that it hurts her stomach really bad and causes her to be blotted and can't go to the bathroom. She was upset that she got something that she can'ty take.

## 2024-01-09 DIAGNOSIS — E11.9 TYPE 2 DIABETES MELLITUS WITHOUT COMPLICATION, WITHOUT LONG-TERM CURRENT USE OF INSULIN (HCC): Primary | ICD-10-CM

## 2024-01-09 RX ORDER — ACARBOSE 25 MG/1
25 TABLET ORAL
Qty: 90 TABLET | Refills: 3 | Status: SHIPPED | OUTPATIENT
Start: 2024-01-09

## 2024-01-09 NOTE — TELEPHONE ENCOUNTER
We will use Precose 25 mg 3 times daily to take with the first bite of each meal.  I have selected a generic medication that should be a lower tier although I feel this medicine is less effective and also increased lower intestinal gas is a common side effect.  Are not a lot of other choices of old generic medicines since she is on glimepiride and does not tolerate Actos or metformin

## 2024-01-09 NOTE — TELEPHONE ENCOUNTER
PLEASE SEE MESSAGE FROM PA SUPERVISOR.       \" Tier exceptions are handled by the patient. That is something they much file by calling the [employer] phone number on the back of the card\"    If this requires a response please respond to the pool ( P MHCX PSC MEDICATION PRE-AUTH).      Thank you please advise patient.

## 2024-01-09 NOTE — TELEPHONE ENCOUNTER
Patient called her insurance company, [employer], they told her to have the Doctor call them and ask for a tier exception for Jardiance, from tier three to tier two, and that may bring the conner down for her.  P#[phone]. Please call patient and advise.

## 2024-01-09 NOTE — TELEPHONE ENCOUNTER
Pt called back and stated that she just wants to go back on the jardiance and forget everything else she does not want to go on insulin

## 2024-01-09 NOTE — TELEPHONE ENCOUNTER
Pt wants to know what they are going to do. She was told at the hospital the spots were ok and wants to find out what the medication for sugar is going to cost before going to breast surgeon and having anymore test and having the cost of those test

## 2024-01-09 NOTE — TELEPHONE ENCOUNTER
I have just ordered a medication that I think will be an older generic and cheaper.  We talked in the office that the cause of the spots was uncertain and that follow-up with the breast specialist was still recommended as a precaution.

## 2024-01-09 NOTE — TELEPHONE ENCOUNTER
Pt states no she does not tolerate the semeglutide and the jardiance is too expensive. Pt states she will not take any insulin and would like to have a lower cost medication on her lower tier.

## 2024-02-12 DIAGNOSIS — I10 ELEVATED BLOOD PRESSURE READING IN OFFICE WITH DIAGNOSIS OF HYPERTENSION: ICD-10-CM

## 2024-02-12 DIAGNOSIS — I10 BENIGN ESSENTIAL HTN: ICD-10-CM

## 2024-02-12 RX ORDER — LISINOPRIL 20 MG/1
20 TABLET ORAL DAILY
Qty: 90 TABLET | Refills: 0 | Status: SHIPPED | OUTPATIENT
Start: 2024-02-12

## 2024-02-12 NOTE — TELEPHONE ENCOUNTER
Refill Request     CONFIRM preferrred pharmacy with the patient.    If Mail Order Rx - Pend for 90 day refill.      Last Seen: Last Seen Department: 1/3/2024  Last Seen by PCP: 1/3/2024    Last Written: 9.29.23    If no future appointment scheduled, route STAFF MESSAGE with patient name to the  Pool for scheduling.      Next Appointment:   Future Appointments   Date Time Provider Department Center   4/3/2024 10:00 AM Heebr Lr MD [facility] [place]       Message sent to  to schedule appt with patient?  NO      Requested Prescriptions      No prescriptions requested or ordered in this encounter    lisinopril (PRINIVIL;ZESTRIL) 20 MG tablet [[id number]]

## 2024-04-03 ENCOUNTER — TELEPHONE (OUTPATIENT)
Dept: FAMILY MEDICINE CLINIC | Age: 69
End: 2024-04-03

## 2024-04-03 ENCOUNTER — OFFICE VISIT (OUTPATIENT)
Dept: FAMILY MEDICINE CLINIC | Age: 69
End: 2024-04-03
Payer: MEDICARE

## 2024-04-03 VITALS
SYSTOLIC BLOOD PRESSURE: 135 MMHG | WEIGHT: 217 LBS | DIASTOLIC BLOOD PRESSURE: 77 MMHG | HEART RATE: 82 BPM | BODY MASS INDEX: 39.69 KG/M2 | OXYGEN SATURATION: 92 %

## 2024-04-03 DIAGNOSIS — E55.9 VITAMIN D DEFICIENCY: ICD-10-CM

## 2024-04-03 DIAGNOSIS — E11.65 TYPE 2 DIABETES MELLITUS WITH HYPERGLYCEMIA, WITHOUT LONG-TERM CURRENT USE OF INSULIN (HCC): Primary | ICD-10-CM

## 2024-04-03 DIAGNOSIS — E78.2 MIXED HYPERLIPIDEMIA: ICD-10-CM

## 2024-04-03 DIAGNOSIS — I10 ESSENTIAL HYPERTENSION: ICD-10-CM

## 2024-04-03 PROCEDURE — 3075F SYST BP GE 130 - 139MM HG: CPT | Performed by: FAMILY MEDICINE

## 2024-04-03 PROCEDURE — 3078F DIAST BP <80 MM HG: CPT | Performed by: FAMILY MEDICINE

## 2024-04-03 PROCEDURE — 36415 COLL VENOUS BLD VENIPUNCTURE: CPT | Performed by: FAMILY MEDICINE

## 2024-04-03 PROCEDURE — 3051F HG A1C>EQUAL 7.0%<8.0%: CPT | Performed by: FAMILY MEDICINE

## 2024-04-03 PROCEDURE — 1123F ACP DISCUSS/DSCN MKR DOCD: CPT | Performed by: FAMILY MEDICINE

## 2024-04-03 PROCEDURE — 99214 OFFICE O/P EST MOD 30 MIN: CPT | Performed by: FAMILY MEDICINE

## 2024-04-03 PROCEDURE — G2211 COMPLEX E/M VISIT ADD ON: HCPCS | Performed by: FAMILY MEDICINE

## 2024-04-03 RX ORDER — ROSUVASTATIN CALCIUM 20 MG/1
20 TABLET, COATED ORAL DAILY
Qty: 90 TABLET | Refills: 3 | Status: SHIPPED | OUTPATIENT
Start: 2024-04-03

## 2024-04-03 ASSESSMENT — PATIENT HEALTH QUESTIONNAIRE - PHQ9
SUM OF ALL RESPONSES TO PHQ QUESTIONS 1-9: 0
SUM OF ALL RESPONSES TO PHQ QUESTIONS 1-9: 0
8. MOVING OR SPEAKING SO SLOWLY THAT OTHER PEOPLE COULD HAVE NOTICED. OR THE OPPOSITE, BEING SO FIGETY OR RESTLESS THAT YOU HAVE BEEN MOVING AROUND A LOT MORE THAN USUAL: NOT AT ALL
3. TROUBLE FALLING OR STAYING ASLEEP: NOT AT ALL
7. TROUBLE CONCENTRATING ON THINGS, SUCH AS READING THE NEWSPAPER OR WATCHING TELEVISION: NOT AT ALL
2. FEELING DOWN, DEPRESSED OR HOPELESS: NOT AT ALL
SUM OF ALL RESPONSES TO PHQ QUESTIONS 1-9: 0
SUM OF ALL RESPONSES TO PHQ9 QUESTIONS 1 & 2: 0
9. THOUGHTS THAT YOU WOULD BE BETTER OFF DEAD, OR OF HURTING YOURSELF: NOT AT ALL
SUM OF ALL RESPONSES TO PHQ QUESTIONS 1-9: 0
4. FEELING TIRED OR HAVING LITTLE ENERGY: NOT AT ALL
6. FEELING BAD ABOUT YOURSELF - OR THAT YOU ARE A FAILURE OR HAVE LET YOURSELF OR YOUR FAMILY DOWN: NOT AT ALL
10. IF YOU CHECKED OFF ANY PROBLEMS, HOW DIFFICULT HAVE THESE PROBLEMS MADE IT FOR YOU TO DO YOUR WORK, TAKE CARE OF THINGS AT HOME, OR GET ALONG WITH OTHER PEOPLE: NOT DIFFICULT AT ALL
1. LITTLE INTEREST OR PLEASURE IN DOING THINGS: NOT AT ALL
5. POOR APPETITE OR OVEREATING: NOT AT ALL

## 2024-04-03 NOTE — PROGRESS NOTES
Insufficiently Active (8/30/2022)    Exercise Vital Sign     Days of Exercise per Week: 2 days     Minutes of Exercise per Session: 10 min   Stress: Not on file   Social Connections: Not on file   Intimate Partner Violence: Not on file   Housing Stability: Unknown (5/9/2023)    Housing Stability Vital Sign     Unable to Pay for Housing in the Last Year: Not on file     Number of Places Lived in the Last Year: Not on file     Unstable Housing in the Last Year: No     Prior to Visit Medications    Medication Sig Taking? Authorizing Provider   lisinopril (PRINIVIL;ZESTRIL) 20 MG tablet Take 1 tablet by mouth daily Yes Heber Lr MD   acarbose (PRECOSE) 25 MG tablet Take 1 tablet by mouth 3 times daily (with meals) Yes Heber Lr MD   empagliflozin (JARDIANCE) 25 MG tablet Take 1 tablet by mouth daily Yes Heber Lr MD   rosuvastatin (CRESTOR) 10 MG tablet TAKE ONE TABLET BY MOUTH DAILY Yes Heber Lr MD   glimepiride (AMARYL) 4 MG tablet TAKE ONE TABLET BY MOUTH TWICE A DAY Yes Heber Lr MD   Blood Glucose Monitoring Suppl (EASY STEP GLUCOSE MONITOR) SAGE Please provide with glucose monitor and kit Yes Heber Lr MD   blood glucose test strips (ASCENSIA AUTODISC VI;ONE TOUCH ULTRA TEST VI) strip 1 each by In Vitro route daily As needed. Yes Heber Lr MD   Lancets MISC 1 each by Does not apply route 2 times daily Yes Heber Lr MD   vitamin D-3 (CHOLECALCIFEROL) 5000 units TABS Take 1 tablet by mouth daily  Patient taking differently: Take 1 tablet by mouth daily 2-3 times a week Yes Heber Lr MD   Omega-3 Fatty Acids (OMEGA 3 PO) Take by mouth Yes Stu Jose MD   fluticasone (FLONASE) 50 MCG/ACT nasal spray In each nostril daily Yes Heber Lr MD   cetirizine (ZYRTEC ALLERGY) 10 MG tablet Take 1 tablet by mouth daily Yes Heber Lr

## 2024-04-03 NOTE — TELEPHONE ENCOUNTER
Could you please submit a tier exception for patients jardiance. She is need of this medication due to uncontrolled type 2 diabetes.

## 2024-04-04 ENCOUNTER — TELEPHONE (OUTPATIENT)
Dept: FAMILY MEDICINE CLINIC | Age: 69
End: 2024-04-04

## 2024-04-04 LAB
EST. AVERAGE GLUCOSE BLD GHB EST-MCNC: 168.6 MG/DL
HBA1C MFR BLD: 7.5 %

## 2024-04-04 NOTE — TELEPHONE ENCOUNTER
SUBMITTED PA FOR Jardiance 25MG tablets  VIA [facility]  Key: [id number] STATUS PENDING.      FOLLOW UP DONE DAILY: IF NO RESPONSE IN 3 DAYS WE WILL REFAX FOR STATUS CHECK. IF ANOTHER 3 DAYS GOES BY WITH NO RESPONSE WILL CALL INSURANCE FOR STATUS.

## 2024-04-04 NOTE — TELEPHONE ENCOUNTER
The medication is APPROVED.Outcome   Additional Information Required  Available without authorization. The member is able to fill the requested drug at the pharmacy. If coverage is still needed or requesting prior to the expiration of a current authorization, a request can be made by sending a fax or calling the number on the back of the member's ID card.    If this requires a response please respond to the pool ( P MHCX PSC MEDICATION PRE-AUTH).      Thank you please advise patient.

## 2024-04-08 DIAGNOSIS — E11.9 TYPE 2 DIABETES MELLITUS WITHOUT COMPLICATION, WITHOUT LONG-TERM CURRENT USE OF INSULIN (HCC): ICD-10-CM

## 2024-04-08 RX ORDER — BLOOD SUGAR DIAGNOSTIC
STRIP MISCELLANEOUS
Qty: 100 STRIP | Refills: 5 | Status: SHIPPED | OUTPATIENT
Start: 2024-04-08

## 2024-04-08 NOTE — TELEPHONE ENCOUNTER
Refill Request     CONFIRM preferrred pharmacy with the patient.    If Mail Order Rx - Pend for 90 day refill.      Last Seen: Last Seen Department: 4/3/2024  Last Seen by PCP: 4/3/2024    Last Written: 2/4/2024    If no future appointment scheduled, route STAFF MESSAGE with patient name to the  Pool for scheduling.      Next Appointment:   Future Appointments   Date Time Provider Department Center   4/10/2024  1:20 PM Heber Lr MD [facility]  [place] [facility] [place]   7/3/2024 10:40 AM Heber Lr MD [facility] [place] [facility] [place]       Message sent to  to schedule appt with patient?  NO      Requested Prescriptions     Pending Prescriptions Disp Refills    ONETOUCH ULTRA strip [Pharmacy Med Name: ONETOUCH ULTRA TEST STRIP] 100 strip      Sig: USE ONE STRIP TO TEST TWICE A DAY AS NEEDED

## 2024-04-10 ENCOUNTER — TELEMEDICINE (OUTPATIENT)
Dept: FAMILY MEDICINE CLINIC | Age: 69
End: 2024-04-10
Payer: MEDICARE

## 2024-04-10 DIAGNOSIS — Z00.00 MEDICARE ANNUAL WELLNESS VISIT, SUBSEQUENT: Primary | ICD-10-CM

## 2024-04-10 PROCEDURE — G0439 PPPS, SUBSEQ VISIT: HCPCS | Performed by: FAMILY MEDICINE

## 2024-04-10 PROCEDURE — 1123F ACP DISCUSS/DSCN MKR DOCD: CPT | Performed by: FAMILY MEDICINE

## 2024-04-10 ASSESSMENT — PATIENT HEALTH QUESTIONNAIRE - PHQ9
10. IF YOU CHECKED OFF ANY PROBLEMS, HOW DIFFICULT HAVE THESE PROBLEMS MADE IT FOR YOU TO DO YOUR WORK, TAKE CARE OF THINGS AT HOME, OR GET ALONG WITH OTHER PEOPLE: NOT DIFFICULT AT ALL
1. LITTLE INTEREST OR PLEASURE IN DOING THINGS: NOT AT ALL
6. FEELING BAD ABOUT YOURSELF - OR THAT YOU ARE A FAILURE OR HAVE LET YOURSELF OR YOUR FAMILY DOWN: NOT AT ALL
SUM OF ALL RESPONSES TO PHQ QUESTIONS 1-9: 1
SUM OF ALL RESPONSES TO PHQ9 QUESTIONS 1 & 2: 1
SUM OF ALL RESPONSES TO PHQ QUESTIONS 1-9: 1
4. FEELING TIRED OR HAVING LITTLE ENERGY: NOT AT ALL
SUM OF ALL RESPONSES TO PHQ QUESTIONS 1-9: 1
5. POOR APPETITE OR OVEREATING: NOT AT ALL
8. MOVING OR SPEAKING SO SLOWLY THAT OTHER PEOPLE COULD HAVE NOTICED. OR THE OPPOSITE, BEING SO FIGETY OR RESTLESS THAT YOU HAVE BEEN MOVING AROUND A LOT MORE THAN USUAL: NOT AT ALL
2. FEELING DOWN, DEPRESSED OR HOPELESS: SEVERAL DAYS
3. TROUBLE FALLING OR STAYING ASLEEP: NOT AT ALL
9. THOUGHTS THAT YOU WOULD BE BETTER OFF DEAD, OR OF HURTING YOURSELF: NOT AT ALL
7. TROUBLE CONCENTRATING ON THINGS, SUCH AS READING THE NEWSPAPER OR WATCHING TELEVISION: NOT AT ALL
SUM OF ALL RESPONSES TO PHQ QUESTIONS 1-9: 1

## 2024-04-10 NOTE — PATIENT INSTRUCTIONS
not try to drive yourself.  Watch closely for changes in your health, and be sure to contact your doctor if you have any problems.  Where can you learn more?  Go to https://[email or web address].net/patientEd and enter F075 to learn more about \"A Healthy Heart: Care Instructions.\"  Current as of: June 24, 2023               Content Version: 14.0  © 9604-8954 [employer].   Care instructions adapted under license by [employer]. If you have questions about a medical condition or this instruction, always ask your healthcare professional. [employer] disclaims any warranty or liability for your use of this information.      Personalized Preventive Plan for Kaila Gonzalez - 4/10/2024  Medicare offers a range of preventive health benefits. Some of the tests and screenings are paid in full while other may be subject to a deductible, co-insurance, and/or copay.    Some of these benefits include a comprehensive review of your medical history including lifestyle, illnesses that may run in your family, and various assessments and screenings as appropriate.    After reviewing your medical record and screening and assessments performed today your provider may have ordered immunizations, labs, imaging, and/or referrals for you.  A list of these orders (if applicable) as well as your Preventive Care list are included within your After Visit Summary for your review.    Other Preventive Recommendations:    A preventive eye exam performed by an eye specialist is recommended every 1-2 years to screen for glaucoma; cataracts, macular degeneration, and other eye disorders.  A preventive dental visit is recommended every 6 months.  Try to get at least 150 minutes of exercise per week or 10,000 steps per day on a pedometer .  Order or download the FREE \"Exercise & Physical Activity: Your Everyday Guide\" from [employer]. Call 1-261.447.5919 or search [employer] online.  You

## 2024-04-10 NOTE — PROGRESS NOTES
specialist    Safety:  Do you have non-slip mats or non-slip surfaces or shower bars or grab bars in your shower or bathtub?: (!) No  Interventions:  Patient aware     Advanced Directives:  Do you have a Living Will?: (!) No    Intervention:                       Objective      Patient-Reported Vitals  No data recorded            Allergies   Allergen Reactions    Actos [Pioglitazone] Itching    Codeine     Fenofibrate      Skin turns red and get hives    Metformin And Related      She states it caused joint pain. She tried it 3 times and had same reaction.      Prior to Visit Medications    Medication Sig Taking? Authorizing Provider   blood glucose test strips (ONETOUCH ULTRA) strip USE ONE STRIP TO TEST TWICE A DAY AS NEEDED Yes Heber Lr MD   rosuvastatin (CRESTOR) 20 MG tablet Take 1 tablet by mouth daily Yes Heber Lr MD   lisinopril (PRINIVIL;ZESTRIL) 20 MG tablet Take 1 tablet by mouth daily Yes Heber Lr MD   acarbose (PRECOSE) 25 MG tablet Take 1 tablet by mouth 3 times daily (with meals) Yes Heber Lr MD   empagliflozin (JARDIANCE) 25 MG tablet Take 1 tablet by mouth daily Yes Heber Lr MD   glimepiride (AMARYL) 4 MG tablet TAKE ONE TABLET BY MOUTH TWICE A DAY Yes Heber Lr MD   Blood Glucose Monitoring Suppl (EASY STEP GLUCOSE MONITOR) SAGE Please provide with glucose monitor and kit Yes Heber Lr MD   Lancets MISC 1 each by Does not apply route 2 times daily Yes Heber Lr MD   vitamin D-3 (CHOLECALCIFEROL) 5000 units TABS Take 1 tablet by mouth daily  Patient taking differently: Take 1 tablet by mouth daily 2-3 times a week Yes Heber Lr MD   Omega-3 Fatty Acids (OMEGA 3 PO) Take by mouth Yes Provider, MD Stu   fluticasone (FLONASE) 50 MCG/ACT nasal spray In each nostril daily Yes Heber Lr MD   cetirizine (ZYRTEC ALLERGY) 10 MG

## 2024-04-19 DIAGNOSIS — I10 ELEVATED BLOOD PRESSURE READING IN OFFICE WITH DIAGNOSIS OF HYPERTENSION: ICD-10-CM

## 2024-04-19 DIAGNOSIS — I10 BENIGN ESSENTIAL HTN: ICD-10-CM

## 2024-04-19 RX ORDER — LISINOPRIL 20 MG/1
20 TABLET ORAL DAILY
Qty: 90 TABLET | Refills: 0 | Status: SHIPPED | OUTPATIENT
Start: 2024-04-19

## 2024-04-19 NOTE — TELEPHONE ENCOUNTER
Refill Request     CONFIRM preferrred pharmacy with the patient.    If Mail Order Rx - Pend for 90 day refill.      Last Seen: Last Seen Department: 4/10/2024  Last Seen by PCP: 4/10/2024    Last Written: 2/12/24    If no future appointment scheduled, route STAFF MESSAGE with patient name to the  Pool for scheduling.      Next Appointment:   Future Appointments   Date Time Provider Department Center   7/3/2024 10:40 AM [facility] [place] [facility]       Message sent to  to schedule appt with patient?  N/A      Requested Prescriptions     Pending Prescriptions Disp Refills    lisinopril (PRINIVIL;ZESTRIL) 20 MG tablet 90 tablet 0     Sig: Take 1 tablet by mouth daily

## 2024-05-14 DIAGNOSIS — E11.9 TYPE 2 DIABETES MELLITUS WITHOUT COMPLICATION, WITHOUT LONG-TERM CURRENT USE OF INSULIN (HCC): ICD-10-CM

## 2024-05-14 RX ORDER — EMPAGLIFLOZIN 25 MG/1
25 TABLET, FILM COATED ORAL DAILY
Qty: 30 TABLET | Refills: 2 | Status: SHIPPED | OUTPATIENT
Start: 2024-05-14

## 2024-05-14 NOTE — TELEPHONE ENCOUNTER
Refill Request     CONFIRM preferrred pharmacy with the patient.    If Mail Order Rx - Pend for 90 day refill.      Last Seen: Last Seen Department: 4/10/2024  Last Seen by PCP: 4/10/2024    Last Written: 1/9/24    If no future appointment scheduled, route STAFF MESSAGE with patient name to the  Pool for scheduling.      Next Appointment:   Future Appointments   Date Time Provider Department Center   7/3/2024 10:40 AM [facility] [place] [facility]       Message sent to  to schedule appt with patient?  N/A      Requested Prescriptions     Pending Prescriptions Disp Refills    JARDIANCE 25 MG tablet [Pharmacy Med Name: JARDIANCE 25 MG TABLET] 30 tablet 2     Sig: TAKE 1 TABLET BY MOUTH DAILY

## 2024-07-17 NOTE — TELEPHONE ENCOUNTER
Refill Request     CONFIRM preferrred pharmacy with the patient.    If Mail Order Rx - Pend for 90 day refill.      Last Seen: Last Seen Department: 4/10/2024  Last Seen by PCP: 4/10/2024    Last Written: 5/10/23    If no future appointment scheduled, route STAFF MESSAGE with patient name to the  Pool for scheduling.      Next Appointment:   Future Appointments   Date Time Provider Department Center   8/14/2024 10:40 AM [facility] [place] [facility]       Message sent to  to schedule appt with patient?  N/A      Requested Prescriptions     Pending Prescriptions Disp Refills    glimepiride (AMARYL) 4 MG tablet [Pharmacy Med Name: GLIMEPIRIDE 4 MG TABLET] 180 tablet 2     Sig: TAKE ONE TABLET BY MOUTH TWICE A DAY

## 2024-07-18 RX ORDER — GLIMEPIRIDE 4 MG/1
TABLET ORAL
Qty: 180 TABLET | Refills: 0 | Status: SHIPPED | OUTPATIENT
Start: 2024-07-18

## 2024-07-29 ENCOUNTER — TELEPHONE (OUTPATIENT)
Dept: FAMILY MEDICINE CLINIC | Age: 69
End: 2024-07-29

## 2024-07-29 DIAGNOSIS — G89.29 CHRONIC PAIN OF LEFT KNEE: Primary | ICD-10-CM

## 2024-07-29 DIAGNOSIS — M25.562 CHRONIC PAIN OF LEFT KNEE: Primary | ICD-10-CM

## 2024-07-29 NOTE — TELEPHONE ENCOUNTER
Pt called and stated that she needs another referral to see Dr. Hawley for her left knee. Said that before it started to feel better and she didn't g, but it hurts even more then it did then.

## 2024-07-31 DIAGNOSIS — I10 BENIGN ESSENTIAL HTN: ICD-10-CM

## 2024-07-31 DIAGNOSIS — I10 ELEVATED BLOOD PRESSURE READING IN OFFICE WITH DIAGNOSIS OF HYPERTENSION: ICD-10-CM

## 2024-07-31 RX ORDER — LISINOPRIL 20 MG/1
20 TABLET ORAL DAILY
Qty: 90 TABLET | Refills: 0 | Status: SHIPPED | OUTPATIENT
Start: 2024-07-31

## 2024-08-05 ENCOUNTER — TELEPHONE (OUTPATIENT)
Dept: FAMILY MEDICINE CLINIC | Age: 69
End: 2024-08-05

## 2024-08-05 DIAGNOSIS — M25.562 CHRONIC PAIN OF LEFT KNEE: Primary | ICD-10-CM

## 2024-08-05 DIAGNOSIS — G89.29 CHRONIC PAIN OF LEFT KNEE: Primary | ICD-10-CM

## 2024-08-05 NOTE — TELEPHONE ENCOUNTER
Pt states that she saw Dr. Conti for her left knee and he suggested therapy or an MRI, and he told her to talk to her pcp. She was wanting to see if she cold get an order for an MRI ordered said that she is in so much pain and wants to find out what is causing it.

## 2024-08-06 ENCOUNTER — TELEPHONE (OUTPATIENT)
Dept: VASCULAR SURGERY | Age: 69
End: 2024-08-06

## 2024-08-06 DIAGNOSIS — I77.1 SUBCLAVIAN ARTERY STENOSIS, LEFT (HCC): Primary | ICD-10-CM

## 2024-08-06 DIAGNOSIS — I65.23 BILATERAL CAROTID ARTERY STENOSIS: ICD-10-CM

## 2024-08-27 ENCOUNTER — TELEPHONE (OUTPATIENT)
Dept: FAMILY MEDICINE CLINIC | Age: 69
End: 2024-08-27

## 2024-08-27 ENCOUNTER — HOSPITAL ENCOUNTER (EMERGENCY)
Age: 69
Discharge: HOME | End: 2024-08-27
Payer: MEDICARE

## 2024-08-27 ENCOUNTER — APPOINTMENT (OUTPATIENT)
Dept: GENERAL RADIOLOGY | Age: 69
End: 2024-08-27
Payer: MEDICARE

## 2024-08-27 VITALS
SYSTOLIC BLOOD PRESSURE: 133 MMHG | RESPIRATION RATE: 16 BRPM | OXYGEN SATURATION: 93 % | HEIGHT: 65 IN | DIASTOLIC BLOOD PRESSURE: 76 MMHG | WEIGHT: 217 LBS | HEART RATE: 78 BPM | TEMPERATURE: 98 F | BODY MASS INDEX: 36.15 KG/M2

## 2024-08-27 DIAGNOSIS — R42 LIGHTHEADEDNESS: ICD-10-CM

## 2024-08-27 DIAGNOSIS — R07.9 CHEST PAIN, UNSPECIFIED TYPE: ICD-10-CM

## 2024-08-27 DIAGNOSIS — R19.7 VOMITING AND DIARRHEA: Primary | ICD-10-CM

## 2024-08-27 DIAGNOSIS — R11.10 VOMITING AND DIARRHEA: Primary | ICD-10-CM

## 2024-08-27 LAB
ALBUMIN SERPL-MCNC: 4.3 G/DL (ref 3.4–5)
ALBUMIN/GLOB SERPL: 1.2 {RATIO} (ref 1.1–2.2)
ALP SERPL-CCNC: 76 U/L (ref 40–129)
ALT SERPL-CCNC: 26 U/L (ref 10–40)
ANION GAP SERPL CALCULATED.3IONS-SCNC: 11 MMOL/L (ref 3–16)
AST SERPL-CCNC: 19 U/L (ref 15–37)
BASOPHILS # BLD: 0.1 K/UL (ref 0–0.2)
BASOPHILS NFR BLD: 0.8 %
BILIRUB SERPL-MCNC: 0.3 MG/DL (ref 0–1)
BUN SERPL-MCNC: 16 MG/DL (ref 7–20)
CALCIUM SERPL-MCNC: 9.4 MG/DL (ref 8.3–10.6)
CHLORIDE SERPL-SCNC: 95 MMOL/L (ref 99–110)
CO2 SERPL-SCNC: 26 MMOL/L (ref 21–32)
CREAT SERPL-MCNC: 0.6 MG/DL (ref 0.6–1.2)
DEPRECATED RDW RBC AUTO: 14.6 % (ref 12.4–15.4)
EKG ATRIAL RATE: 85 BPM
EKG DIAGNOSIS: NORMAL
EKG P AXIS: 51 DEGREES
EKG P-R INTERVAL: 156 MS
EKG Q-T INTERVAL: 390 MS
EKG QRS DURATION: 98 MS
EKG QTC CALCULATION (BAZETT): 464 MS
EKG R AXIS: 18 DEGREES
EKG T AXIS: 44 DEGREES
EKG VENTRICULAR RATE: 85 BPM
EOSINOPHIL # BLD: 0.2 K/UL (ref 0–0.6)
EOSINOPHIL NFR BLD: 2.6 %
GFR SERPLBLD CREATININE-BSD FMLA CKD-EPI: >90 ML/MIN/{1.73_M2}
GLUCOSE SERPL-MCNC: 181 MG/DL (ref 70–99)
HCT VFR BLD AUTO: 44.2 % (ref 36–48)
HGB BLD-MCNC: 14.4 G/DL (ref 12–16)
LIPASE SERPL-CCNC: 22 U/L (ref 13–60)
LYMPHOCYTES # BLD: 1.5 K/UL (ref 1–5.1)
LYMPHOCYTES NFR BLD: 24.6 %
MCH RBC QN AUTO: 27.4 PG (ref 26–34)
MCHC RBC AUTO-ENTMCNC: 32.5 G/DL (ref 31–36)
MCV RBC AUTO: 84.5 FL (ref 80–100)
MONOCYTES # BLD: 0.6 K/UL (ref 0–1.3)
MONOCYTES NFR BLD: 9.1 %
NEUTROPHILS # BLD: 3.9 K/UL (ref 1.7–7.7)
NEUTROPHILS NFR BLD: 62.9 %
PLATELET # BLD AUTO: 243 K/UL (ref 135–450)
PMV BLD AUTO: 8 FL (ref 5–10.5)
POTASSIUM SERPL-SCNC: 4 MMOL/L (ref 3.5–5.1)
PROT SERPL-MCNC: 7.9 G/DL (ref 6.4–8.2)
RBC # BLD AUTO: 5.23 M/UL (ref 4–5.2)
SARS-COV-2 RDRP RESP QL NAA+PROBE: NOT DETECTED
SODIUM SERPL-SCNC: 132 MMOL/L (ref 136–145)
TROPONIN, HIGH SENSITIVITY: 10 NG/L (ref 0–14)
TROPONIN, HIGH SENSITIVITY: 10 NG/L (ref 0–14)
WBC # BLD AUTO: 6.1 K/UL (ref 4–11)

## 2024-08-27 PROCEDURE — 83690 ASSAY OF LIPASE: CPT

## 2024-08-27 PROCEDURE — 99285 EMERGENCY DEPT VISIT HI MDM: CPT

## 2024-08-27 PROCEDURE — 71045 X-RAY EXAM CHEST 1 VIEW: CPT

## 2024-08-27 PROCEDURE — 6360000002 HC RX W HCPCS: Performed by: PHYSICIAN ASSISTANT

## 2024-08-27 PROCEDURE — 87635 SARS-COV-2 COVID-19 AMP PRB: CPT

## 2024-08-27 PROCEDURE — 2580000003 HC RX 258: Performed by: PHYSICIAN ASSISTANT

## 2024-08-27 PROCEDURE — 93005 ELECTROCARDIOGRAM TRACING: CPT | Performed by: PHYSICIAN ASSISTANT

## 2024-08-27 PROCEDURE — 96374 THER/PROPH/DIAG INJ IV PUSH: CPT

## 2024-08-27 PROCEDURE — 84484 ASSAY OF TROPONIN QUANT: CPT

## 2024-08-27 PROCEDURE — 80053 COMPREHEN METABOLIC PANEL: CPT

## 2024-08-27 PROCEDURE — 93010 ELECTROCARDIOGRAM REPORT: CPT | Performed by: INTERNAL MEDICINE

## 2024-08-27 PROCEDURE — 96361 HYDRATE IV INFUSION ADD-ON: CPT

## 2024-08-27 PROCEDURE — 85025 COMPLETE CBC W/AUTO DIFF WBC: CPT

## 2024-08-27 RX ORDER — SODIUM CHLORIDE, SODIUM LACTATE, POTASSIUM CHLORIDE, AND CALCIUM CHLORIDE .6; .31; .03; .02 G/100ML; G/100ML; G/100ML; G/100ML
1000 INJECTION, SOLUTION INTRAVENOUS ONCE
Status: COMPLETED | OUTPATIENT
Start: 2024-08-27 | End: 2024-08-27

## 2024-08-27 RX ORDER — ONDANSETRON 4 MG/1
4 TABLET, FILM COATED ORAL EVERY 8 HOURS PRN
Qty: 12 TABLET | Refills: 0 | Status: SHIPPED | OUTPATIENT
Start: 2024-08-27

## 2024-08-27 RX ORDER — ONDANSETRON 2 MG/ML
4 INJECTION INTRAMUSCULAR; INTRAVENOUS ONCE
Status: COMPLETED | OUTPATIENT
Start: 2024-08-27 | End: 2024-08-27

## 2024-08-27 RX ADMIN — ONDANSETRON 4 MG: 2 INJECTION INTRAMUSCULAR; INTRAVENOUS at 14:19

## 2024-08-27 RX ADMIN — SODIUM CHLORIDE, POTASSIUM CHLORIDE, SODIUM LACTATE AND CALCIUM CHLORIDE 1000 ML: 600; 310; 30; 20 INJECTION, SOLUTION INTRAVENOUS at 14:21

## 2024-08-27 ASSESSMENT — PAIN DESCRIPTION - LOCATION: LOCATION: CHEST

## 2024-08-27 ASSESSMENT — PAIN SCALES - GENERAL: PAINLEVEL_OUTOF10: 4

## 2024-08-27 ASSESSMENT — PAIN DESCRIPTION - DESCRIPTORS: DESCRIPTORS: TIGHTNESS

## 2024-08-27 NOTE — TELEPHONE ENCOUNTER
Pt calling to see if she could get an appointment and when asking what the visit would be for, she stated that she had an \"episode\" last night and it involved waves of dizziness, moderate chest pain forcing her to clinch her chest, weakness, hot flashes and vomiting with diarrhea. I asked pt if she was currently having these sx's and she said that she is still feeling very weak, and hot. She said the chest pain has gotten better but does still feel tender when she moves. I advised pt to go to the ER as she said that this was the second time it happened and she said her chest pain is still there after asking a few more questions. The pt verbalized understanding once I explained to her that they would be able to to blood work, EKG, testing and we would get the results.

## 2024-08-27 NOTE — ED NOTES
Discharge instructions reviewed with patent and she verbalizes understanding. PIV removed without complications. Patient ambulatory out of the ED without assistance.

## 2024-08-27 NOTE — ED PROVIDER NOTES
[facility] ED  EMERGENCY DEPARTMENT ENCOUNTER        Pt Name: Kaila Gonzalez  MRN: [id number]  Birthdate 1955  Date of evaluation: 8/27/2024  Provider: Raad Kulkarni PA-C  PCP: Heber Lr MD  Note Started: 2:04 PM EDT 8/27/24      SCOTT. I have evaluated this patient.        CHIEF COMPLAINT       Chief Complaint   Patient presents with    Dizziness     States at 2300 last night had episode where was dizzy and felt like was going to pass out. States vomited and diarrhea.     Nausea    Fatigue       HISTORY OF PRESENT ILLNESS: 1 or more Elements     History From: pt    Kaila Gonzalez is a 69 y.o. female with past history of hypertension, diabetes who presents complaining of vomiting, diarrhea, dizziness, fatigue since yesterday.  Patient reports last night had multiple episodes of vomiting and diarrhea with some chest tightness that occurred during the vomiting.  Today she is able to tolerate p.o., no more vomiting and diarrhea, no more chest pain.  She does continue to feel generally weak, fatigued.  She called her primary care today who directed her to the ER for workup.  Denies fever, sick contacts, blood in emesis or stool, abdominal pain, syncope.  Dizziness is described as lightheadedness.  Denies any vertigo, vision change, focal weakness, paresthesia.  She is ambulatory with cane at baseline back to triage.    Nursing Notes were all reviewed and agreed with or any disagreements were addressed in the HPI.    REVIEW OF SYSTEMS :      Review of Systems   All other systems reviewed and are negative.      Positives and Pertinent negatives as per HPI.       PAST MEDICAL HISTORY    has a past medical history of Allergic rhinitis, Dizziness (10/20/2020), Hearing loss, HTN (hypertension), Hyperlipidemia, No history of procedure (2/5/16), and Type II or unspecified type diabetes mellitus without mention of complication, not stated as uncontrolled.     SURGICAL HISTORY

## 2024-08-30 ENCOUNTER — CARE COORDINATION (OUTPATIENT)
Dept: CARE COORDINATION | Age: 69
End: 2024-08-30

## 2024-08-30 NOTE — CARE COORDINATION
Ambulatory Care Coordination Note     8/30/2024 10:21 AM     Patient outreach attempt by this ACM today to offer care management services. ACM was unable to reach the patient by telephone today; left voice message requesting a return phone call to this ACM. ER visit on 8/27/24 for N/V and CP.      ACM: Naomy Gerber RN         PCP/Specialist follow up:   Future Appointments         Provider Specialty Dept Phone    9/4/2024 2:00 PM (Arrive by 1:30 PM) [facility] 1 Cardiology [phone]    10/9/2024 10:40 AM Heber Lr MD Family Medicine [phone]            Follow Up:   Plan for next AC outreach in approximately 1-2 days  to complete:  2nd ER follow up call is needed  .  Assess for CC program needs.

## 2024-09-03 ENCOUNTER — CARE COORDINATION (OUTPATIENT)
Dept: CARE COORDINATION | Age: 69
End: 2024-09-03

## 2024-09-03 NOTE — CARE COORDINATION
Ambulatory Care Coordination Note     9/3/2024 9:43 AM     patient outreach attempt by this AC today to offer care management services. AC was unable to reach the patient by telephone today; left voice message requesting a return phone call to this ACM.  Wom[employer]reethart message sent requesting patient to contact this AC.     Patient closed (unable to reach patient) from the High Risk Care Management program on 9/3/2024.  Patient  UTR  .  Care management goals have been completed. No further Ambulatory Care Manager follow up scheduled.

## 2024-09-09 ENCOUNTER — OFFICE VISIT (OUTPATIENT)
Dept: FAMILY MEDICINE CLINIC | Age: 69
End: 2024-09-09
Payer: MEDICARE

## 2024-09-09 VITALS
HEART RATE: 69 BPM | SYSTOLIC BLOOD PRESSURE: 145 MMHG | OXYGEN SATURATION: 97 % | DIASTOLIC BLOOD PRESSURE: 70 MMHG | BODY MASS INDEX: 36.32 KG/M2 | HEIGHT: 65 IN | WEIGHT: 218 LBS

## 2024-09-09 DIAGNOSIS — Z91.09 ENVIRONMENTAL ALLERGIES: ICD-10-CM

## 2024-09-09 DIAGNOSIS — H10.403 CHRONIC BACTERIAL CONJUNCTIVITIS OF BOTH EYES: Primary | ICD-10-CM

## 2024-09-09 DIAGNOSIS — E66.01 SEVERE OBESITY (BMI 35.0-39.9) WITH COMORBIDITY (HCC): ICD-10-CM

## 2024-09-09 PROCEDURE — 99213 OFFICE O/P EST LOW 20 MIN: CPT | Performed by: NURSE PRACTITIONER

## 2024-09-09 PROCEDURE — 3078F DIAST BP <80 MM HG: CPT | Performed by: NURSE PRACTITIONER

## 2024-09-09 PROCEDURE — 3077F SYST BP >= 140 MM HG: CPT | Performed by: NURSE PRACTITIONER

## 2024-09-09 PROCEDURE — 1123F ACP DISCUSS/DSCN MKR DOCD: CPT | Performed by: NURSE PRACTITIONER

## 2024-09-09 RX ORDER — ERYTHROMYCIN 5 MG/G
OINTMENT OPHTHALMIC
Qty: 1 G | Refills: 0 | Status: SHIPPED | OUTPATIENT
Start: 2024-09-09 | End: 2024-09-19

## 2024-09-09 RX ORDER — OLOPATADINE HYDROCHLORIDE 2 MG/ML
1 SOLUTION/ DROPS OPHTHALMIC DAILY
Qty: 2.5 ML | Refills: 0 | Status: SHIPPED | OUTPATIENT
Start: 2024-09-09

## 2024-09-09 SDOH — ECONOMIC STABILITY: FOOD INSECURITY: WITHIN THE PAST 12 MONTHS, YOU WORRIED THAT YOUR FOOD WOULD RUN OUT BEFORE YOU GOT MONEY TO BUY MORE.: NEVER TRUE

## 2024-09-09 SDOH — ECONOMIC STABILITY: INCOME INSECURITY: HOW HARD IS IT FOR YOU TO PAY FOR THE VERY BASICS LIKE FOOD, HOUSING, MEDICAL CARE, AND HEATING?: NOT HARD AT ALL

## 2024-09-09 SDOH — ECONOMIC STABILITY: FOOD INSECURITY: WITHIN THE PAST 12 MONTHS, THE FOOD YOU BOUGHT JUST DIDN'T LAST AND YOU DIDN'T HAVE MONEY TO GET MORE.: NEVER TRUE

## 2024-09-09 ASSESSMENT — ENCOUNTER SYMPTOMS
TROUBLE SWALLOWING: 0
SORE THROAT: 0
EYE ITCHING: 1
RHINORRHEA: 0
STRIDOR: 0
PHOTOPHOBIA: 0
FACIAL SWELLING: 0
VOICE CHANGE: 0
EYE PAIN: 0
EYE DISCHARGE: 1
EYE REDNESS: 1
RESPIRATORY NEGATIVE: 1
DOUBLE VISION: 0
SWOLLEN GLANDS: 0
SINUS PRESSURE: 0
SINUS PAIN: 0

## 2024-09-10 ENCOUNTER — TELEPHONE (OUTPATIENT)
Dept: ADMINISTRATIVE | Age: 69
End: 2024-09-10

## 2024-09-10 NOTE — TELEPHONE ENCOUNTER
Submitted PA for OLAPATADINE  Via CM Key: [id number]  STATUS: PENDING.    Follow up done daily; if no decision with in three days we will refax.  If another three days goes by with no decision will call the insurance for status.

## 2024-10-09 ENCOUNTER — OFFICE VISIT (OUTPATIENT)
Dept: FAMILY MEDICINE CLINIC | Age: 69
End: 2024-10-09
Payer: MEDICARE

## 2024-10-09 DIAGNOSIS — I10 BENIGN ESSENTIAL HTN: ICD-10-CM

## 2024-10-09 DIAGNOSIS — Z78.0 POST-MENOPAUSAL: ICD-10-CM

## 2024-10-09 DIAGNOSIS — E11.65 TYPE 2 DIABETES MELLITUS WITH HYPERGLYCEMIA, WITHOUT LONG-TERM CURRENT USE OF INSULIN (HCC): Primary | ICD-10-CM

## 2024-10-09 DIAGNOSIS — E55.9 VITAMIN D DEFICIENCY: ICD-10-CM

## 2024-10-09 DIAGNOSIS — E78.2 MIXED HYPERLIPIDEMIA: ICD-10-CM

## 2024-10-09 PROCEDURE — 99214 OFFICE O/P EST MOD 30 MIN: CPT | Performed by: FAMILY MEDICINE

## 2024-10-09 PROCEDURE — 3051F HG A1C>EQUAL 7.0%<8.0%: CPT | Performed by: FAMILY MEDICINE

## 2024-10-09 PROCEDURE — 1123F ACP DISCUSS/DSCN MKR DOCD: CPT | Performed by: FAMILY MEDICINE

## 2024-10-09 PROCEDURE — 36415 COLL VENOUS BLD VENIPUNCTURE: CPT | Performed by: FAMILY MEDICINE

## 2024-10-09 PROCEDURE — G2211 COMPLEX E/M VISIT ADD ON: HCPCS | Performed by: FAMILY MEDICINE

## 2024-10-09 NOTE — PROGRESS NOTES
applicable.    Unless stated otherwise, we will continue current medications as listed in the medication review report contained in the patient's medical record.    This document was prepared by a combination of typing and transcription through a voice recognition software.                Scribe attestation: IMICKEY MD, am scribing for and in the presence of MICKEY LR MD. Electronically signed by MICKEY LR MD on 10/9/2024 at 11:12 AM      Provider attestation:     I, Dr. ALEKS Lr, personally performed the services described in this documentation, as scribed by the above signed scribe in my presence, and it is both accurate and complete. I agree with the ROS and Past Histories independently gathered by the clinical support staff and the remaining scribed note accurately describes my personal service to the patient.      10/9/2024    11:12 AM

## 2024-10-10 LAB
CHOLEST SERPL-MCNC: 223 MG/DL (ref 0–199)
EST. AVERAGE GLUCOSE BLD GHB EST-MCNC: 174.3 MG/DL
HBA1C MFR BLD: 7.7 %
HDLC SERPL-MCNC: 68 MG/DL (ref 40–60)
LDLC SERPL CALC-MCNC: 120 MG/DL
TRIGL SERPL-MCNC: 176 MG/DL (ref 0–150)
VLDLC SERPL CALC-MCNC: 35 MG/DL

## 2024-10-15 RX ORDER — GLIMEPIRIDE 4 MG/1
TABLET ORAL
Qty: 180 TABLET | Refills: 0 | Status: SHIPPED | OUTPATIENT
Start: 2024-10-15

## 2024-10-15 NOTE — TELEPHONE ENCOUNTER
Thank you note refill sent.  Attempt to contact patient to review. Unable to leave message. Mychart message sent.  No further outreach planned at this time.    For Pharmacy Admin Tracking Only    Program: [employer]  CPA in place:  No  Recommendation Provided To: Provider: 1 via Note to Provider  Intervention Detail: Refill(s) Provided  Intervention Accepted By: Provider: 1  Gap Closed?: No   Time Spent (min): 15    
40 U/L Final     AST   Date Value Ref Range Status   08/27/2024 19 15 - 37 U/L Final     The 10-year ASCVD risk score (Kesha DK, et al., 2019) is: 20.9%    Values used to calculate the score:      Age: 69 years      Sex: Female      Is Non- : No      Diabetic: Yes      Tobacco smoker: No      Systolic Blood Pressure: 145 mmHg      Is BP treated: Yes      HDL Cholesterol: 68 mg/dL      Total Cholesterol: 223 mg/dL     PLAN  The following are interventions that have been identified:   Patient OVERDUE refilling glimepiride and rosuvastatin and active on home medication list. Rosuvastatin ready for  10/16/24 after 2PM  Patient NEEDS REFILLS for glimepiride    Will pend refill request to prescriber.    Last Visit: 10/9/24  Future Appointments   Date Time Provider Department Center   4/14/2025 10:10 AM Glenis Mari MD [facility]       Flory Connolly, PharmD, [facility], [facility]  [facility]  [facility] Clinical Pharmacy  Department, toll free: [phone], option 1

## 2024-11-04 DIAGNOSIS — I10 BENIGN ESSENTIAL HTN: ICD-10-CM

## 2024-11-04 DIAGNOSIS — I10 ELEVATED BLOOD PRESSURE READING IN OFFICE WITH DIAGNOSIS OF HYPERTENSION: ICD-10-CM

## 2024-11-04 RX ORDER — LISINOPRIL 20 MG/1
20 TABLET ORAL DAILY
Qty: 90 TABLET | Refills: 0 | Status: SHIPPED | OUTPATIENT
Start: 2024-11-04

## 2024-11-04 NOTE — TELEPHONE ENCOUNTER
Refill Request     CONFIRM preferrred pharmacy with the patient.    If Mail Order Rx - Pend for 90 day refill.      Last Seen: Last Seen Department: 10/9/2024  Last Seen by PCP: 10/9/2024    Last Written: 7.31.24    If no future appointment scheduled, route STAFF MESSAGE with patient name to the  Pool for scheduling.      Next Appointment:   Future Appointments   Date Time Provider Department Center   4/14/2025 10:10 AM [facility]       Message sent to  to schedule appt with patient?  N/A      Requested Prescriptions     Pending Prescriptions Disp Refills    lisinopril (PRINIVIL;ZESTRIL) 20 MG tablet [Pharmacy Med Name: LISINOPRIL 20 MG TABLET] 90 tablet 0     Sig: TAKE 1 TABLET BY MOUTH DAILY

## 2024-12-19 DIAGNOSIS — E11.9 TYPE 2 DIABETES MELLITUS WITHOUT COMPLICATION, WITHOUT LONG-TERM CURRENT USE OF INSULIN (HCC): ICD-10-CM

## 2024-12-19 RX ORDER — EMPAGLIFLOZIN 25 MG/1
25 TABLET, FILM COATED ORAL DAILY
Qty: 30 TABLET | Refills: 2 | Status: SHIPPED | OUTPATIENT
Start: 2024-12-19

## 2024-12-19 NOTE — TELEPHONE ENCOUNTER
Refill Request     CONFIRM preferrred pharmacy with the patient.    If Mail Order Rx - Pend for 90 day refill.      Last Seen: Last Seen Department: 10/9/2024  Last Seen by PCP: 10/9/2024    Last Written: 5/14/24    If no future appointment scheduled, route STAFF MESSAGE with patient name to the  Pool for scheduling.      Next Appointment:   Future Appointments   Date Time Provider Department Center   4/14/2025 10:10 AM [facility]       Message sent to  to schedule appt with patient?  N/A      Requested Prescriptions     Pending Prescriptions Disp Refills    JARDIANCE 25 MG tablet [Pharmacy Med Name: JARDIANCE 25 MG TABLET] 30 tablet 2     Sig: TAKE 1 TABLET BY MOUTH DAILY

## 2025-01-21 NOTE — TELEPHONE ENCOUNTER
Attempted to contact patient on 3/14/2018. Result: left message on the patient's voicemail asking patient to return my call. Pre-Visit planning not completed. no known allergies

## 2025-02-20 DIAGNOSIS — I10 ELEVATED BLOOD PRESSURE READING IN OFFICE WITH DIAGNOSIS OF HYPERTENSION: ICD-10-CM

## 2025-02-20 DIAGNOSIS — I10 BENIGN ESSENTIAL HTN: ICD-10-CM

## 2025-02-20 RX ORDER — GLIMEPIRIDE 4 MG/1
TABLET ORAL
Qty: 180 TABLET | Refills: 0 | Status: SHIPPED | OUTPATIENT
Start: 2025-02-20

## 2025-02-20 RX ORDER — LISINOPRIL 20 MG/1
20 TABLET ORAL DAILY
Qty: 90 TABLET | Refills: 0 | Status: SHIPPED | OUTPATIENT
Start: 2025-02-20

## 2025-02-20 NOTE — TELEPHONE ENCOUNTER
Refill Request     CONFIRM preferrred pharmacy with the patient.    If Mail Order Rx - Pend for 90 day refill.      Last Seen: Last Seen Department: 10/9/2024  Last Seen by PCP: Visit date not found    Last Written: lisinopril 11/4/24  Glimepiride 10/15/24    If no future appointment scheduled, route STAFF MESSAGE with patient name to the  Pool for scheduling.      Next Appointment:   Future Appointments   Date Time Provider Department Center   4/14/2025 10:10 AM [facility]       Message sent to  to schedule appt with patient?  N/A      Requested Prescriptions     Pending Prescriptions Disp Refills    glimepiride (AMARYL) 4 MG tablet 180 tablet 0     Sig: TAKE ONE TABLET BY MOUTH TWICE A DAY    lisinopril (PRINIVIL;ZESTRIL) 20 MG tablet 90 tablet 0     Sig: Take 1 tablet by mouth daily

## 2025-05-28 DIAGNOSIS — I10 ELEVATED BLOOD PRESSURE READING IN OFFICE WITH DIAGNOSIS OF HYPERTENSION: ICD-10-CM

## 2025-05-28 DIAGNOSIS — I10 BENIGN ESSENTIAL HTN: ICD-10-CM

## 2025-05-28 RX ORDER — LISINOPRIL 20 MG/1
20 TABLET ORAL DAILY
Qty: 30 TABLET | Refills: 0 | Status: SHIPPED | OUTPATIENT
Start: 2025-05-28

## 2025-05-28 RX ORDER — GLIMEPIRIDE 4 MG/1
4 TABLET ORAL 2 TIMES DAILY
Qty: 60 TABLET | Refills: 0 | Status: SHIPPED | OUTPATIENT
Start: 2025-05-28

## 2025-06-05 ENCOUNTER — OFFICE VISIT (OUTPATIENT)
Dept: FAMILY MEDICINE CLINIC | Age: 70
End: 2025-06-05
Payer: MEDICARE

## 2025-06-05 VITALS
OXYGEN SATURATION: 98 % | RESPIRATION RATE: 20 BRPM | SYSTOLIC BLOOD PRESSURE: 148 MMHG | BODY MASS INDEX: 38.27 KG/M2 | HEART RATE: 80 BPM | WEIGHT: 230 LBS | DIASTOLIC BLOOD PRESSURE: 77 MMHG

## 2025-06-05 DIAGNOSIS — E78.2 MIXED HYPERLIPIDEMIA: ICD-10-CM

## 2025-06-05 DIAGNOSIS — E11.65 TYPE 2 DIABETES MELLITUS WITH HYPERGLYCEMIA, WITHOUT LONG-TERM CURRENT USE OF INSULIN (HCC): ICD-10-CM

## 2025-06-05 DIAGNOSIS — K59.01 SLOW TRANSIT CONSTIPATION: ICD-10-CM

## 2025-06-05 DIAGNOSIS — E66.01 MORBID OBESITY DUE TO EXCESS CALORIES (HCC): ICD-10-CM

## 2025-06-05 DIAGNOSIS — I10 BENIGN ESSENTIAL HTN: Primary | ICD-10-CM

## 2025-06-05 DIAGNOSIS — M17.12 PRIMARY OSTEOARTHRITIS OF LEFT KNEE: ICD-10-CM

## 2025-06-05 PROBLEM — R42 DIZZINESS: Status: RESOLVED | Noted: 2020-10-20 | Resolved: 2025-06-05

## 2025-06-05 PROCEDURE — 1159F MED LIST DOCD IN RCRD: CPT | Performed by: FAMILY MEDICINE

## 2025-06-05 PROCEDURE — 99214 OFFICE O/P EST MOD 30 MIN: CPT | Performed by: FAMILY MEDICINE

## 2025-06-05 PROCEDURE — 3078F DIAST BP <80 MM HG: CPT | Performed by: FAMILY MEDICINE

## 2025-06-05 PROCEDURE — 1123F ACP DISCUSS/DSCN MKR DOCD: CPT | Performed by: FAMILY MEDICINE

## 2025-06-05 PROCEDURE — 36415 COLL VENOUS BLD VENIPUNCTURE: CPT | Performed by: FAMILY MEDICINE

## 2025-06-05 PROCEDURE — 3077F SYST BP >= 140 MM HG: CPT | Performed by: FAMILY MEDICINE

## 2025-06-05 SDOH — ECONOMIC STABILITY: FOOD INSECURITY: WITHIN THE PAST 12 MONTHS, THE FOOD YOU BOUGHT JUST DIDN'T LAST AND YOU DIDN'T HAVE MONEY TO GET MORE.: NEVER TRUE

## 2025-06-05 SDOH — ECONOMIC STABILITY: FOOD INSECURITY: WITHIN THE PAST 12 MONTHS, YOU WORRIED THAT YOUR FOOD WOULD RUN OUT BEFORE YOU GOT MONEY TO BUY MORE.: NEVER TRUE

## 2025-06-05 ASSESSMENT — PATIENT HEALTH QUESTIONNAIRE - PHQ9
10. IF YOU CHECKED OFF ANY PROBLEMS, HOW DIFFICULT HAVE THESE PROBLEMS MADE IT FOR YOU TO DO YOUR WORK, TAKE CARE OF THINGS AT HOME, OR GET ALONG WITH OTHER PEOPLE: NOT DIFFICULT AT ALL
DEPRESSION UNABLE TO ASSESS: PT REFUSES
3. TROUBLE FALLING OR STAYING ASLEEP: NOT AT ALL
8. MOVING OR SPEAKING SO SLOWLY THAT OTHER PEOPLE COULD HAVE NOTICED. OR THE OPPOSITE, BEING SO FIGETY OR RESTLESS THAT YOU HAVE BEEN MOVING AROUND A LOT MORE THAN USUAL: NOT AT ALL
6. FEELING BAD ABOUT YOURSELF - OR THAT YOU ARE A FAILURE OR HAVE LET YOURSELF OR YOUR FAMILY DOWN: NOT AT ALL
1. LITTLE INTEREST OR PLEASURE IN DOING THINGS: NOT AT ALL
2. FEELING DOWN, DEPRESSED OR HOPELESS: NOT AT ALL
4. FEELING TIRED OR HAVING LITTLE ENERGY: NOT AT ALL
5. POOR APPETITE OR OVEREATING: NOT AT ALL
9. THOUGHTS THAT YOU WOULD BE BETTER OFF DEAD, OR OF HURTING YOURSELF: NOT AT ALL
SUM OF ALL RESPONSES TO PHQ QUESTIONS 1-9: 0
SUM OF ALL RESPONSES TO PHQ QUESTIONS 1-9: 0
7. TROUBLE CONCENTRATING ON THINGS, SUCH AS READING THE NEWSPAPER OR WATCHING TELEVISION: NOT AT ALL
SUM OF ALL RESPONSES TO PHQ QUESTIONS 1-9: 0
SUM OF ALL RESPONSES TO PHQ QUESTIONS 1-9: 0

## 2025-06-05 NOTE — ASSESSMENT & PLAN NOTE
Previous labs reviewed from October 2 for labs today.  Continue current medication.  Patient is not taking glimepiride as written.  States she is taking her Jardiance but sometimes skips the dose.  Did not tolerate metformin or pioglitazone.  Consider Januvia or equivalent. Encouraged her to get her follow-up eye exam this year    Orders:    Hemoglobin A1C    Lipid Panel    Comprehensive Metabolic Panel    Albumin/Creatinine Ratio, Urine    CBC    TSH reflex to T4F

## 2025-06-05 NOTE — ASSESSMENT & PLAN NOTE
Had previous injection with minimal improvement.  It has been over a year since she seen orthopedics and is agreeable to getting a second opinion.    Orders:    Dani Webb DO, Orthopedics and Sports Medicine (Hip; Knee; Shoulder), [place]

## 2025-06-05 NOTE — ASSESSMENT & PLAN NOTE
Blood pressure elevated.  On lisinopril.  Continue current medication given first-time visit.  If remains elevated at follow-up we will adjust accordingly.

## 2025-06-05 NOTE — PROGRESS NOTES
Kaila Gonzalez (:  1955) is a 70 y.o. female,Established patient, here for evaluation of the following chief complaint(s):  Establish Care (Pt would like to get some blood work done )           Assessment & Plan  Benign essential HTN    Blood pressure elevated.  On lisinopril.  Continue current medication given first-time visit.  If remains elevated at follow-up we will adjust accordingly.         Type 2 diabetes mellitus with hyperglycemia, without long-term current use of insulin (HCC)    Previous labs reviewed from  for labs today.  Continue current medication.  Patient is not taking glimepiride as written.  States she is taking her Jardiance but sometimes skips the dose.  Did not tolerate metformin or pioglitazone.  Consider Januvia or equivalent.  Encouraged her to get her follow-up eye exam this year    Orders:    Hemoglobin A1C    Lipid Panel    Comprehensive Metabolic Panel    Albumin/Creatinine Ratio, Urine    CBC    TSH reflex to T4F    Slow transit constipation    Taking MiraLAX daily.         Primary osteoarthritis of left knee    Had previous injection with minimal improvement.  It has been over a year since she seen orthopedics and is agreeable to getting a second opinion.    Orders:    Dani Webb, , Orthopedics and Sports Medicine (Hip; Knee; Shoulder), [place]    Mixed hyperlipidemia    Continue rosuvastatin         Morbid obesity due to excess calories (HCC)  Discussed need for dietary control limiting calorie intake.  Exercising as much as tolerated.           Return in about 3 months (around 2025) for DM2.       Subjective   HPI  70-year-old female here to establish.  History of diabetes hypertension arthritis obesity.  Complains of left knee pain.  No fevers no chills no headache chest pain shortness of breath.  Cannot recall a specific injury.  Has difficulty walking due to knee pain.    Review of Systems       Objective   Vitals:    25 1055

## 2025-06-06 LAB
ALBUMIN SERPL-MCNC: 4.6 G/DL (ref 3.4–5)
ALBUMIN/GLOB SERPL: 1.6 {RATIO} (ref 1.1–2.2)
ALP SERPL-CCNC: 88 U/L (ref 40–129)
ALT SERPL-CCNC: 23 U/L (ref 10–40)
ANION GAP SERPL CALCULATED.3IONS-SCNC: 14 MMOL/L (ref 3–16)
AST SERPL-CCNC: 20 U/L (ref 15–37)
BILIRUB SERPL-MCNC: 0.3 MG/DL (ref 0–1)
BUN SERPL-MCNC: 18 MG/DL (ref 7–20)
CALCIUM SERPL-MCNC: 10.1 MG/DL (ref 8.3–10.6)
CHLORIDE SERPL-SCNC: 102 MMOL/L (ref 99–110)
CHOLEST SERPL-MCNC: 209 MG/DL (ref 0–199)
CO2 SERPL-SCNC: 24 MMOL/L (ref 21–32)
CREAT SERPL-MCNC: 0.8 MG/DL (ref 0.6–1.2)
CREAT UR-MCNC: 85.9 MG/DL (ref 28–259)
DEPRECATED RDW RBC AUTO: 14.9 % (ref 12.4–15.4)
EST. AVERAGE GLUCOSE BLD GHB EST-MCNC: 214.5 MG/DL
GFR SERPLBLD CREATININE-BSD FMLA CKD-EPI: 80 ML/MIN/{1.73_M2}
GLUCOSE SERPL-MCNC: 193 MG/DL (ref 70–99)
HBA1C MFR BLD: 9.1 %
HCT VFR BLD AUTO: 45.2 % (ref 36–48)
HDLC SERPL-MCNC: 64 MG/DL (ref 40–60)
HGB BLD-MCNC: 14.8 G/DL (ref 12–16)
LDLC SERPL CALC-MCNC: 101 MG/DL
MCH RBC QN AUTO: 27.5 PG (ref 26–34)
MCHC RBC AUTO-ENTMCNC: 32.8 G/DL (ref 31–36)
MCV RBC AUTO: 84 FL (ref 80–100)
MICROALBUMIN UR DL<=1MG/L-MCNC: <1.2 MG/DL
MICROALBUMIN/CREAT UR: NORMAL MG/G (ref 0–30)
PLATELET # BLD AUTO: 226 K/UL (ref 135–450)
PMV BLD AUTO: 9.4 FL (ref 5–10.5)
POTASSIUM SERPL-SCNC: 4.7 MMOL/L (ref 3.5–5.1)
PROT SERPL-MCNC: 7.5 G/DL (ref 6.4–8.2)
RBC # BLD AUTO: 5.39 M/UL (ref 4–5.2)
SODIUM SERPL-SCNC: 140 MMOL/L (ref 136–145)
TRIGL SERPL-MCNC: 219 MG/DL (ref 0–150)
TSH SERPL DL<=0.005 MIU/L-ACNC: 0.97 UIU/ML (ref 0.27–4.2)
VLDLC SERPL CALC-MCNC: 44 MG/DL
WBC # BLD AUTO: 5.5 K/UL (ref 4–11)

## 2025-06-07 ENCOUNTER — RESULTS FOLLOW-UP (OUTPATIENT)
Dept: FAMILY MEDICINE CLINIC | Age: 70
End: 2025-06-07

## 2025-06-07 DIAGNOSIS — E11.65 TYPE 2 DIABETES MELLITUS WITH HYPERGLYCEMIA, WITHOUT LONG-TERM CURRENT USE OF INSULIN (HCC): Primary | ICD-10-CM

## 2025-06-18 DIAGNOSIS — I10 BENIGN ESSENTIAL HTN: ICD-10-CM

## 2025-06-18 DIAGNOSIS — I10 ELEVATED BLOOD PRESSURE READING IN OFFICE WITH DIAGNOSIS OF HYPERTENSION: ICD-10-CM

## 2025-06-18 RX ORDER — LISINOPRIL 20 MG/1
20 TABLET ORAL DAILY
Qty: 30 TABLET | Refills: 2 | Status: SHIPPED | OUTPATIENT
Start: 2025-06-18

## 2025-06-20 ENCOUNTER — OFFICE VISIT (OUTPATIENT)
Dept: ORTHOPEDIC SURGERY | Age: 70
End: 2025-06-20

## 2025-06-20 VITALS — HEIGHT: 65 IN | WEIGHT: 230 LBS | BODY MASS INDEX: 38.32 KG/M2

## 2025-06-20 DIAGNOSIS — M17.12 PRIMARY OSTEOARTHRITIS OF LEFT KNEE: Primary | ICD-10-CM

## 2025-06-20 NOTE — PROGRESS NOTES
Date:  2025    Name:  Kaila Gonzalez  Address:  [address] [place] [address]    :  1955      Age:   70 y.o.    SSN:  [id number]      Medical Record Number:  [id number]    Reason for Visit:    Chief Complaint    Knee Pain (CK LT. KNEE)      DOS:2025     HPI: Kaila Gonzalez is a 70 y.o. female here today for new patient evaluation regarding her left knee    History of Present Illness  The patient is a 70-year-old female here today for a new patient evaluation regarding her left knee. She was referred by Dr. Sandoval for further assessment of her left knee, which has been causing discomfort since 2024. She describes the pain as a stabbing sensation, accompanied by a feeling of something being pulled inside the knee. The pain radiates to the back of the knee and has been persistent since its onset in 2024. She has a history of issues with the same knee but has not undergone any surgical interventions. The pain is somewhat alleviated with medication. She received steroid injections from her previous physician, with the last one administered in , but these only provided temporary relief for a few days.           ROS: All systems reviewed on patient intake form.  Pertinent items are noted in HPI.        Past Medical History:   Diagnosis Date    Allergic rhinitis     Dizziness 10/20/2020    Elevated blood pressure reading in office with diagnosis of hypertension 2019    Hearing loss     HTN (hypertension)     Hyperlipidemia     No history of procedure 2016    colonoscopy    Obesity     Type II or unspecified type diabetes mellitus without mention of complication, not stated as uncontrolled         Past Surgical History:   Procedure Laterality Date    COLONOSCOPY  2016    normal    TUBAL LIGATION         Family History   Problem Relation Age of Onset    Diabetes Mother     Kidney Disease Mother     Heart Disease Mother     Heart Disease Father     High Blood Pressure

## 2025-06-30 DIAGNOSIS — E11.9 TYPE 2 DIABETES MELLITUS WITHOUT COMPLICATION, WITHOUT LONG-TERM CURRENT USE OF INSULIN (HCC): ICD-10-CM

## 2025-07-03 DIAGNOSIS — E78.2 MIXED HYPERLIPIDEMIA: ICD-10-CM

## 2025-07-03 NOTE — TELEPHONE ENCOUNTER
Jaime Castellanos MD, patient out of refills and patient eligible for up to 100-day supply, if appropriate.     Prescription(s) pended for your signature/modification as appropriate for the following medication(s):  Rosuvastatin 20 mg daily    [facility] [id number] - [facility] [place] - [address] - P [phone] - F [phone]  [address] [place] [address]  Phone: [phone] Fax: [phone]      Next refill due: 5/16/25    Last Visit: 6/5/25  Next Visit: TBD    Thank you,  Manasa Trammell, PharmD, [facility]  Population Health Pharmacist  [facility] Clinical Pharmacy  Department, toll free: [phone], option 1   ===============================================================================    POPULATION HEALTH CLINICAL PHARMACY: ADHERENCE REVIEW  Identified care gap per [place]: fills at [facility]: Statin adherence    [facility] [id number] - [place], [place] - [address] - P [phone] - F [phone]  [address] [place] [address]  Phone: [phone] Fax: [phone]        Patient also appears to be prescribed: ACE/ARB, Diabetes, and Statin     Current Outpatient Medications   Medication Instructions    aspirin 162 mg, DAILY    Blood Glucose Monitoring Suppl (EASY STEP GLUCOSE MONITOR) SAGE Please provide with glucose monitor and kit    blood glucose test strips (ONETOUCH ULTRA) strip USE ONE STRIP TO TEST TWICE A DAY AS NEEDED    calcium carbonate (OSCAL) 1,000 mg, Oral, DAILY    cetirizine (ZYRTEC ALLERGY) 10 mg, Oral, DAILY    empagliflozin (JARDIANCE) 25 mg, Oral, DAILY    fluticasone (FLONASE) 50 MCG/ACT nasal spray In each nostril daily    glimepiride (AMARYL) 4 mg, Oral, 2 TIMES DAILY    Lancets MISC 1 each, Does not apply, 2 TIMES DAILY    lisinopril (PRINIVIL;ZESTRIL) 20 mg, Oral, DAILY    Omega-3 Fatty Acids (OMEGA 3 PO) Take by mouth    ondansetron (ZOFRAN) 4 mg, Oral, EVERY 8 HOURS PRN    rosuvastatin (CRESTOR) 20 mg, Oral, DAILY    SITagliptin (JANUVIA)

## 2025-07-07 RX ORDER — ROSUVASTATIN CALCIUM 20 MG/1
20 TABLET, COATED ORAL DAILY
Qty: 100 TABLET | Refills: 1 | Status: SHIPPED | OUTPATIENT
Start: 2025-07-07

## 2025-07-07 NOTE — TELEPHONE ENCOUNTER
Rosuvastatin 20 mg (100- day) refills were sent to [facility] on 25.    Left message for patient regarding refill for .  Will also send a letter.      aMnasa Trammell, PharmD, [facility]  Population Health Pharmacist  [facility] Clinical Pharmacy  Department, toll free: [phone], option 1      For Pharmacy Admin Tracking Only  Program: [employer]  CPA in place:  No  Recommendation Provided To: Provider: 1 via Note to Provider  Intervention Detail: Adherence Monitorin and New Rx: 1, reason: Improve Adherence  Intervention Accepted By: Provider: 1  Gap Closed?: No   Time Spent (min): 30

## 2025-08-15 ENCOUNTER — TELEPHONE (OUTPATIENT)
Dept: ORTHOPEDIC SURGERY | Age: 70
End: 2025-08-15

## 2025-08-15 DIAGNOSIS — M17.12 PRIMARY OSTEOARTHRITIS OF LEFT KNEE: Primary | ICD-10-CM

## 2025-08-27 ENCOUNTER — TELEPHONE (OUTPATIENT)
Dept: ORTHOPEDIC SURGERY | Age: 70
End: 2025-08-27

## 2025-09-02 ENCOUNTER — OFFICE VISIT (OUTPATIENT)
Dept: ORTHOPEDIC SURGERY | Age: 70
End: 2025-09-02
Payer: MEDICARE

## 2025-09-02 VITALS — BODY MASS INDEX: 38.32 KG/M2 | HEIGHT: 65 IN | WEIGHT: 230 LBS

## 2025-09-02 DIAGNOSIS — M17.12 PRIMARY OSTEOARTHRITIS OF LEFT KNEE: Primary | ICD-10-CM

## 2025-09-02 PROCEDURE — 20610 DRAIN/INJ JOINT/BURSA W/O US: CPT | Performed by: STUDENT IN AN ORGANIZED HEALTH CARE EDUCATION/TRAINING PROGRAM
